# Patient Record
Sex: MALE | Race: WHITE | NOT HISPANIC OR LATINO | Employment: OTHER | URBAN - NONMETROPOLITAN AREA
[De-identification: names, ages, dates, MRNs, and addresses within clinical notes are randomized per-mention and may not be internally consistent; named-entity substitution may affect disease eponyms.]

---

## 2017-03-29 ENCOUNTER — OFFICE VISIT (OUTPATIENT)
Dept: NEUROSURGERY | Facility: CLINIC | Age: 72
End: 2017-03-29

## 2017-03-29 VITALS
WEIGHT: 173 LBS | DIASTOLIC BLOOD PRESSURE: 78 MMHG | HEIGHT: 72 IN | SYSTOLIC BLOOD PRESSURE: 168 MMHG | BODY MASS INDEX: 23.43 KG/M2

## 2017-03-29 DIAGNOSIS — D32.9 BENIGN MENINGIOMA (HCC): Primary | ICD-10-CM

## 2017-03-29 DIAGNOSIS — G20 PRIMARY PARKINSONISM (HCC): ICD-10-CM

## 2017-03-29 DIAGNOSIS — R41.3 MEMORY DIFFICULTIES: ICD-10-CM

## 2017-03-29 DIAGNOSIS — IMO0001 NORMAL BODY MASS INDEX (BMI): ICD-10-CM

## 2017-03-29 DIAGNOSIS — Z78.9 NON-SMOKER: ICD-10-CM

## 2017-03-29 PROCEDURE — 99203 OFFICE O/P NEW LOW 30 MIN: CPT | Performed by: NEUROLOGICAL SURGERY

## 2017-03-29 PROCEDURE — G8420 CALC BMI NORM PARAMETERS: HCPCS | Performed by: NEUROLOGICAL SURGERY

## 2017-03-29 PROCEDURE — 1036F TOBACCO NON-USER: CPT | Performed by: NEUROLOGICAL SURGERY

## 2017-03-29 NOTE — PROGRESS NOTES
Patient: Asher Torres  : 1945    Primary Care Provider: Eugene Correa MD    Requesting Provider: No ref. provider found        History    Chief Complaint: Speech difficulty  Chief Complaint   Patient presents with   • Abnormal Imaging     patient w/MRI brain from 3/17/17 done @ [facility] - meningioma?       History of Present Illness: This is a 71-year-old male sent from primary care with an abnormal MRI.  Mr. Torres relates 9 months of depression, speech difficulty, slowing of his movements, falling, some blurred vision, occasional headache.  The symptoms at all progressively gotten worse although they have waxed and waned in intensity.  The MRI was part of a workup for these complaints.    Review of Systems   All other systems reviewed and are negative.      Past Medical History:   Past Medical History:   Diagnosis Date   • BPH with urinary obstruction    • Coronary artery disease     WITH HISTORY OF MYOCARDIAL INFARCTION W/OUT HISTORY OF CABG   • Diverticulitis of colon    • Intrinsic sphincter deficiency (ISD)    • Myocardial infarction    • Nocturia    • Prostate cancer    • Renal cyst, right    • Urge incontinence    • Urinary frequency        Past Surgical History:   Past Surgical History:   Procedure Laterality Date   • BACK SURGERY     • NECK SURGERY     • PROSTATE SURGERY         Family History: family history includes Colon cancer in his brother.    Social History:  reports that he has never smoked. He does not have any smokeless tobacco history on file. He reports that he does not drink alcohol.    Medications:    (Not in a hospital admission)    Allergies:  Other; Proscar [finasteride]; and Sulfa antibiotics    Physical Exam:     Physical Exam   Constitutional: He is oriented to person, place, and time. He appears well-developed and well-nourished.   Cardiovascular: Regular rhythm and normal heart sounds.    Pulmonary/Chest: Effort normal and breath sounds normal. No  respiratory distress.   Abdominal: Soft. He exhibits no distension. There is no tenderness.   Neurological: He is oriented to person, place, and time. He has normal strength. He has an abnormal Romberg Test. He has a normal Tandem Gait Test.   Reflex Scores:       Tricep reflexes are 1+ on the right side and 1+ on the left side.       Bicep reflexes are 1+ on the right side and 1+ on the left side.       Brachioradialis reflexes are 1+ on the right side and 1+ on the left side.       Patellar reflexes are 1+ on the right side and 1+ on the left side.       Achilles reflexes are 1+ on the right side and 1+ on the left side.  Psychiatric: His speech is normal.       Neurologic Exam     Mental Status   Oriented to person, place, and time.   Attention: normal.   Speech: speech is normal   Level of consciousness: alert  Knowledge: good.        Bradyphrenia  Mask face     Cranial Nerves   Cranial nerves II through XII intact.     Motor Exam   Muscle bulk: normal  Overall muscle tone: normal  Right arm pronator drift: absent  Left arm pronator drift: absent    Strength   Strength 5/5 throughout.        Madi kinetic     Sensory Exam   Light touch normal.   Pinprick normal.     Gait, Coordination, and Reflexes     Gait  Gait: (Slow shuffling gait)    Coordination   Romberg: positive  Tandem walking coordination: normal    Tremor   Resting tremor: absent  Intention tremor: absent  Action tremor: absent    Reflexes   Right brachioradialis: 1+  Left brachioradialis: 1+  Right biceps: 1+  Left biceps: 1+  Right triceps: 1+  Left triceps: 1+  Right patellar: 1+  Left patellar: 1+  Right achilles: 1+  Left achilles: 1+  Right Colin: absent  Left Colin: absent  Right ankle clonus: absent  Left ankle clonus: absent        Independent Review of Radiographic Studies:   MRI of the brain shows a right frontal dural based homogeneously enhancing mass measuring approximately 2.3 cm with no mass effect or midline shift.  Consistent  with meningioma    ASSESSMENT/PLAN:  has a right frontal meningioma right now that is asymptomatic and does not require treatment.  Watch this with serial imaging every 6 months and plan focal beam radiation treatments should this mass demonstrate growth and progression.  Regarding his other complaints I think Mr. Torres has a Parkinson-like syndrome given his mask face Madi kinetic movements and bradyphrenia.  We are going to refer him to Dr. Schmidt consideration of this diagnosis.  We will reimage him in 6 months with an MRI.  1. Benign meningioma    2. Primary Parkinsonism    3. Memory difficulties    4. Normal body mass index (BMI)    5. Non-smoker          Return in about 6 months (around 9/29/2017) for follow up w/scan - DR SMITH.      Jimmy Smith MD

## 2017-05-15 ENCOUNTER — OFFICE VISIT (OUTPATIENT)
Dept: NEUROLOGY | Age: 72
End: 2017-05-15
Payer: MEDICARE

## 2017-05-15 VITALS
BODY MASS INDEX: 25.2 KG/M2 | WEIGHT: 180 LBS | HEIGHT: 71 IN | HEART RATE: 71 BPM | SYSTOLIC BLOOD PRESSURE: 177 MMHG | DIASTOLIC BLOOD PRESSURE: 105 MMHG

## 2017-05-15 DIAGNOSIS — Z86.39 HISTORY OF NON ANEMIC VITAMIN B12 DEFICIENCY: ICD-10-CM

## 2017-05-15 DIAGNOSIS — G20 PARKINSON DISEASE (HCC): Primary | ICD-10-CM

## 2017-05-15 DIAGNOSIS — R41.3 MEMORY LOSS: ICD-10-CM

## 2017-05-15 DIAGNOSIS — I10 ESSENTIAL HYPERTENSION: ICD-10-CM

## 2017-05-15 DIAGNOSIS — G62.9 NEUROPATHY: ICD-10-CM

## 2017-05-15 DIAGNOSIS — D32.9 MENINGIOMA (HCC): ICD-10-CM

## 2017-05-15 PROCEDURE — 3017F COLORECTAL CA SCREEN DOC REV: CPT | Performed by: PSYCHIATRY & NEUROLOGY

## 2017-05-15 PROCEDURE — 4040F PNEUMOC VAC/ADMIN/RCVD: CPT | Performed by: PSYCHIATRY & NEUROLOGY

## 2017-05-15 PROCEDURE — 99204 OFFICE O/P NEW MOD 45 MIN: CPT | Performed by: PSYCHIATRY & NEUROLOGY

## 2017-05-15 PROCEDURE — G8420 CALC BMI NORM PARAMETERS: HCPCS | Performed by: PSYCHIATRY & NEUROLOGY

## 2017-05-15 PROCEDURE — 1036F TOBACCO NON-USER: CPT | Performed by: PSYCHIATRY & NEUROLOGY

## 2017-05-15 PROCEDURE — G8427 DOCREV CUR MEDS BY ELIG CLIN: HCPCS | Performed by: PSYCHIATRY & NEUROLOGY

## 2017-05-15 PROCEDURE — 1123F ACP DISCUSS/DSCN MKR DOCD: CPT | Performed by: PSYCHIATRY & NEUROLOGY

## 2017-05-17 ENCOUNTER — TELEPHONE (OUTPATIENT)
Dept: NEUROLOGY | Age: 72
End: 2017-05-17

## 2017-05-23 ENCOUNTER — HOSPITAL ENCOUNTER (OUTPATIENT)
Dept: NEUROLOGY | Age: 72
Discharge: HOME | End: 2017-05-23
Payer: MEDICARE

## 2017-05-25 ENCOUNTER — TELEPHONE (OUTPATIENT)
Dept: NEUROLOGY | Age: 72
End: 2017-05-25

## 2017-05-25 DIAGNOSIS — G20 PARKINSON'S DISEASE (HCC): Primary | ICD-10-CM

## 2017-05-25 DIAGNOSIS — G20 PARKINSON DISEASE (HCC): Primary | ICD-10-CM

## 2017-06-02 ENCOUNTER — TRANSCRIBE ORDERS (OUTPATIENT)
Dept: ADMINISTRATIVE | Facility: HOSPITAL | Age: 72
End: 2017-06-02

## 2017-06-02 DIAGNOSIS — I25.10 CAD IN NATIVE ARTERY: Primary | ICD-10-CM

## 2017-06-12 ENCOUNTER — HOSPITAL ENCOUNTER (OUTPATIENT)
Dept: CARDIOLOGY | Facility: HOSPITAL | Age: 72
Discharge: HOME | End: 2017-06-12

## 2017-06-12 ENCOUNTER — HOSPITAL ENCOUNTER (OUTPATIENT)
Dept: CARDIOLOGY | Facility: HOSPITAL | Age: 72
Discharge: HOME | End: 2017-06-12
Admitting: FAMILY MEDICINE

## 2017-06-12 VITALS — SYSTOLIC BLOOD PRESSURE: 147 MMHG | DIASTOLIC BLOOD PRESSURE: 88 MMHG | HEART RATE: 55 BPM

## 2017-06-12 DIAGNOSIS — I25.10 CAD IN NATIVE ARTERY: ICD-10-CM

## 2017-06-12 PROCEDURE — 78452 HT MUSCLE IMAGE SPECT MULT: CPT | Performed by: INTERNAL MEDICINE

## 2017-06-12 PROCEDURE — 25010000002 REGADENOSON 0.4 MG/5ML SOLUTION: Performed by: INTERNAL MEDICINE

## 2017-06-12 PROCEDURE — 78452 HT MUSCLE IMAGE SPECT MULT: CPT

## 2017-06-12 PROCEDURE — 0 TECHNETIUM SESTAMIBI: Performed by: FAMILY MEDICINE

## 2017-06-12 PROCEDURE — A9500 TC99M SESTAMIBI: HCPCS | Performed by: FAMILY MEDICINE

## 2017-06-12 PROCEDURE — 93017 CV STRESS TEST TRACING ONLY: CPT

## 2017-06-12 PROCEDURE — 25010000002 AMINOPHYLLINE PER 250 MG: Performed by: INTERNAL MEDICINE

## 2017-06-12 PROCEDURE — 93018 CV STRESS TEST I&R ONLY: CPT | Performed by: INTERNAL MEDICINE

## 2017-06-12 RX ORDER — AMINOPHYLLINE DIHYDRATE 25 MG/ML
100 INJECTION, SOLUTION INTRAVENOUS ONCE AS NEEDED
Status: COMPLETED | OUTPATIENT
Start: 2017-06-12 | End: 2017-06-12

## 2017-06-12 RX ADMIN — Medication 1 DOSE: at 09:20

## 2017-06-12 RX ADMIN — Medication 1 DOSE: at 10:51

## 2017-06-12 RX ADMIN — AMINOPHYLLINE 100 MG: 25 INJECTION, SOLUTION INTRAVENOUS at 11:03

## 2017-06-12 RX ADMIN — REGADENOSON 0.4 MG: 0.08 INJECTION, SOLUTION INTRAVENOUS at 10:51

## 2017-06-13 LAB
BH CV STRESS BP STAGE 1: NORMAL
BH CV STRESS COMMENTS STAGE 1: NORMAL
BH CV STRESS DOSE REGADENOSON STAGE 1: 0.4
BH CV STRESS DURATION MIN STAGE 1: 0
BH CV STRESS DURATION SEC STAGE 1: 10
BH CV STRESS HR STAGE 1: 49
BH CV STRESS PROTOCOL 1: NORMAL
BH CV STRESS RECOVERY BP: NORMAL MMHG
BH CV STRESS RECOVERY HR: 63 BPM
BH CV STRESS STAGE 1: 1
LV EF NUC BP: 60 %
MAXIMAL PREDICTED HEART RATE: 148 BPM
PERCENT MAX PREDICTED HR: 33.11 %
STRESS BASELINE BP: NORMAL MMHG
STRESS BASELINE HR: 55 BPM
STRESS PERCENT HR: 39 %
STRESS POST PEAK BP: NORMAL MMHG
STRESS POST PEAK HR: 49 BPM
STRESS TARGET HR: 126 BPM

## 2017-08-15 ENCOUNTER — HOSPITAL ENCOUNTER (OUTPATIENT)
Dept: NEUROLOGY | Age: 72
Discharge: HOME | End: 2017-08-15
Payer: MEDICARE

## 2017-08-15 DIAGNOSIS — G62.9 NEUROPATHY: ICD-10-CM

## 2017-08-15 PROCEDURE — 95909 NRV CNDJ TST 5-6 STUDIES: CPT | Performed by: PSYCHIATRY & NEUROLOGY

## 2017-08-15 PROCEDURE — 95886 MUSC TEST DONE W/N TEST COMP: CPT | Performed by: PSYCHIATRY & NEUROLOGY

## 2017-08-15 PROCEDURE — 95909 NRV CNDJ TST 5-6 STUDIES: CPT

## 2017-08-15 PROCEDURE — 95886 MUSC TEST DONE W/N TEST COMP: CPT

## 2017-08-17 ENCOUNTER — OFFICE VISIT (OUTPATIENT)
Dept: NEUROLOGY | Age: 72
End: 2017-08-17
Payer: MEDICARE

## 2017-08-17 VITALS
DIASTOLIC BLOOD PRESSURE: 100 MMHG | BODY MASS INDEX: 23.98 KG/M2 | SYSTOLIC BLOOD PRESSURE: 148 MMHG | HEIGHT: 72 IN | WEIGHT: 177 LBS

## 2017-08-17 DIAGNOSIS — G20 PARKINSON DISEASE (HCC): ICD-10-CM

## 2017-08-17 DIAGNOSIS — G62.9 NEUROPATHY: ICD-10-CM

## 2017-08-17 DIAGNOSIS — G20 PARKINSON'S DISEASE (HCC): Primary | ICD-10-CM

## 2017-08-17 DIAGNOSIS — Z86.011 HISTORY OF MENINGIOMA OF THE BRAIN: ICD-10-CM

## 2017-08-17 DIAGNOSIS — G25.81 RESTLESS LEG SYNDROME: ICD-10-CM

## 2017-08-17 PROCEDURE — 1123F ACP DISCUSS/DSCN MKR DOCD: CPT | Performed by: PSYCHIATRY & NEUROLOGY

## 2017-08-17 PROCEDURE — 1036F TOBACCO NON-USER: CPT | Performed by: PSYCHIATRY & NEUROLOGY

## 2017-08-17 PROCEDURE — G8427 DOCREV CUR MEDS BY ELIG CLIN: HCPCS | Performed by: PSYCHIATRY & NEUROLOGY

## 2017-08-17 PROCEDURE — 4040F PNEUMOC VAC/ADMIN/RCVD: CPT | Performed by: PSYCHIATRY & NEUROLOGY

## 2017-08-17 PROCEDURE — 99214 OFFICE O/P EST MOD 30 MIN: CPT | Performed by: PSYCHIATRY & NEUROLOGY

## 2017-08-17 PROCEDURE — G8420 CALC BMI NORM PARAMETERS: HCPCS | Performed by: PSYCHIATRY & NEUROLOGY

## 2017-08-17 PROCEDURE — 3017F COLORECTAL CA SCREEN DOC REV: CPT | Performed by: PSYCHIATRY & NEUROLOGY

## 2017-08-17 RX ORDER — ASPIRIN 325 MG
325 TABLET ORAL PRN
COMMUNITY
End: 2019-08-05

## 2017-08-17 RX ORDER — DULOXETIN HYDROCHLORIDE 20 MG/1
20 CAPSULE, DELAYED RELEASE ORAL DAILY
Qty: 90 CAPSULE | Refills: 3 | Status: SHIPPED | OUTPATIENT
Start: 2017-08-17 | End: 2017-12-29

## 2017-09-26 ENCOUNTER — HOSPITAL ENCOUNTER (OUTPATIENT)
Dept: MRI IMAGING | Facility: HOSPITAL | Age: 72
Discharge: HOME | End: 2017-09-26
Attending: NEUROLOGICAL SURGERY | Admitting: NEUROLOGICAL SURGERY

## 2017-09-26 ENCOUNTER — OFFICE VISIT (OUTPATIENT)
Dept: NEUROSURGERY | Facility: CLINIC | Age: 72
End: 2017-09-26

## 2017-09-26 VITALS
WEIGHT: 177.5 LBS | BODY MASS INDEX: 24.04 KG/M2 | SYSTOLIC BLOOD PRESSURE: 116 MMHG | DIASTOLIC BLOOD PRESSURE: 58 MMHG | HEIGHT: 72 IN

## 2017-09-26 DIAGNOSIS — Z78.9 NON-SMOKER: ICD-10-CM

## 2017-09-26 DIAGNOSIS — C61 PROSTATE CANCER (HCC): Primary | ICD-10-CM

## 2017-09-26 DIAGNOSIS — G20 PRIMARY PARKINSONISM (HCC): ICD-10-CM

## 2017-09-26 DIAGNOSIS — R41.3 MEMORY DIFFICULTIES: ICD-10-CM

## 2017-09-26 DIAGNOSIS — D32.9 BENIGN MENINGIOMA (HCC): ICD-10-CM

## 2017-09-26 DIAGNOSIS — D32.9 BENIGN MENINGIOMA (HCC): Primary | ICD-10-CM

## 2017-09-26 DIAGNOSIS — IMO0001 NORMAL BODY MASS INDEX (BMI): ICD-10-CM

## 2017-09-26 LAB
CREAT BLDA-MCNC: 1.1 MG/DL (ref 0.6–1.3)
PSA SERPL-MCNC: <0.07 NG/ML (ref 0–4)

## 2017-09-26 PROCEDURE — 70553 MRI BRAIN STEM W/O & W/DYE: CPT

## 2017-09-26 PROCEDURE — 25510000001 GADOBENATE DIMEGLUMINE 529 MG/ML SOLUTION: Performed by: NEUROLOGICAL SURGERY

## 2017-09-26 PROCEDURE — A9577 INJ MULTIHANCE: HCPCS | Performed by: NEUROLOGICAL SURGERY

## 2017-09-26 PROCEDURE — 99213 OFFICE O/P EST LOW 20 MIN: CPT | Performed by: NEUROLOGICAL SURGERY

## 2017-09-26 PROCEDURE — 82565 ASSAY OF CREATININE: CPT

## 2017-09-26 RX ORDER — DULOXETIN HYDROCHLORIDE 20 MG/1
CAPSULE, DELAYED RELEASE ORAL
Refills: 3 | COMMUNITY
Start: 2017-08-17 | End: 2017-10-19 | Stop reason: ALTCHOICE

## 2017-09-26 RX ORDER — CARVEDILOL 12.5 MG/1
TABLET ORAL
Refills: 12 | COMMUNITY
Start: 2017-09-12 | End: 2018-09-27 | Stop reason: DRUGHIGH

## 2017-09-26 RX ORDER — ASPIRIN 325 MG
TABLET ORAL
COMMUNITY
End: 2020-09-24 | Stop reason: ALTCHOICE

## 2017-09-26 RX ADMIN — GADOBENATE DIMEGLUMINE 20 ML: 529 INJECTION, SOLUTION INTRAVENOUS at 13:15

## 2017-09-26 NOTE — PROGRESS NOTES
SUBJECTIVE:  Patient ID: Asher Torres is a 71 y.o. male is here today for follow-up.    Chief Complaint: Meningioma  Chief Complaint   Patient presents with   • Brain Tumor     MRI today @ [facility]; patient states he has seen Dr Ghosh and therapy       HPI  71-year-old gentleman that we saw about 6 months ago for incidental meningioma.  He has since been diagnosed with Parkinson's disease.  He is on Sinemet and has had a very good response.  In addition he has done Parkinson's rehabilitation with a excellent response in his functioning.    The following portions of the patient's history were reviewed and updated as appropriate: allergies, current medications, past family history, past medical history, past social history, past surgical history and problem list.    OBJECTIVE:    Review of Systems   Musculoskeletal: Positive for gait problem.   Neurological: Positive for weakness.   Psychiatric/Behavioral: Positive for behavioral problems.   All other systems reviewed and are negative.         Physical Exam   Constitutional: He is oriented to person, place, and time.   HENT:   Right Ear: Hearing normal.   Left Ear: Hearing normal.   Neurological: He is oriented to person, place, and time. He displays a negative Romberg sign.   Psychiatric: His speech is normal. Cognition and memory are normal.       Neurologic Exam     Mental Status   Oriented to person, place, and time.   Attention: normal.   Speech: speech is normal   Level of consciousness: alert  Knowledge: good.        Mask face     Cranial Nerves   Cranial nerves II through XII intact.     Motor Exam   Muscle bulk: normal  Overall muscle tone: normal  Right arm pronator drift: absent  Left arm pronator drift: absent    Sensory Exam   Light touch normal.   Pinprick normal.     Gait, Coordination, and Reflexes     Gait  Gait: (Slow gait and cautious but good step length)    Tremor   Resting tremor: absent  Intention tremor: absent  Action tremor:  absent      Independent Review of Radiographic Studies:   MRI of the brain with and without contrast shows a stable right frontal meningioma measuring 13 x 18 x 23 mm.    ASSESSMENT/PLAN:  The patient has been diagnosed with Parkinson's and has been started on therapy and is doing very well.  His meningioma is unchanged compared to previous imaging.  I would reimage him every year.      1. Benign meningioma    2. Primary Parkinsonism    3. Non-smoker    4. Normal body mass index (BMI)            Return in about 1 year (around 9/26/2018) for follow up w/scan - DR SMITH.      Jimmy Smith MD

## 2017-10-19 ENCOUNTER — OFFICE VISIT (OUTPATIENT)
Dept: UROLOGY | Facility: CLINIC | Age: 72
End: 2017-10-19

## 2017-10-19 VITALS
BODY MASS INDEX: 24.33 KG/M2 | TEMPERATURE: 98 F | DIASTOLIC BLOOD PRESSURE: 70 MMHG | SYSTOLIC BLOOD PRESSURE: 138 MMHG | WEIGHT: 179.6 LBS | HEIGHT: 72 IN

## 2017-10-19 DIAGNOSIS — N32.81 OAB (OVERACTIVE BLADDER): ICD-10-CM

## 2017-10-19 DIAGNOSIS — N39.3 SUI (STRESS URINARY INCONTINENCE), MALE: ICD-10-CM

## 2017-10-19 DIAGNOSIS — C61 PROSTATE CANCER (HCC): Primary | ICD-10-CM

## 2017-10-19 LAB
BILIRUB BLD-MCNC: NEGATIVE MG/DL
CLARITY, POC: CLEAR
COLOR UR: YELLOW
GLUCOSE UR STRIP-MCNC: NEGATIVE MG/DL
KETONES UR QL: NEGATIVE
LEUKOCYTE EST, POC: NEGATIVE
NITRITE UR-MCNC: NEGATIVE MG/ML
PH UR: 6 [PH] (ref 5–8)
PROT UR STRIP-MCNC: NEGATIVE MG/DL
RBC # UR STRIP: NEGATIVE /UL
SP GR UR: 1.03 (ref 1–1.03)
UROBILINOGEN UR QL: NORMAL

## 2017-10-19 PROCEDURE — 81003 URINALYSIS AUTO W/O SCOPE: CPT | Performed by: UROLOGY

## 2017-10-19 PROCEDURE — 99214 OFFICE O/P EST MOD 30 MIN: CPT | Performed by: UROLOGY

## 2017-10-19 PROCEDURE — 51798 US URINE CAPACITY MEASURE: CPT | Performed by: UROLOGY

## 2017-10-19 RX ORDER — SOLIFENACIN SUCCINATE 5 MG/1
5 TABLET, FILM COATED ORAL DAILY
Qty: 30 TABLET | Refills: 1 | Status: SHIPPED | OUTPATIENT
Start: 2017-10-19 | End: 2017-11-27 | Stop reason: ALTCHOICE

## 2017-10-19 NOTE — PROGRESS NOTES
Subjective    Mr. Torres is 72 y.o. male    CHIEF COMPLAINT: Prostate cancer    The patient is now 4 years status post radical prostatectomy for stage TIc disease moderate [place]'s grade he underwent a robotic resection he has done well since then without evidence of any disease his most recent PSA is 0.    He has no symptoms of metastatic disease such as bone scan bone pain rather back pain weight loss and anorexia etc.    He also has a history of stress incontinence incontinence postoperative the last year he has noticed is gotten a lot worse he has also been diagnosed with a year however with Parkinson's disease he is now on Sinemet.    He is not having a lot more urgency frequency and dribbling all the time one more pass etc. I do not know whether this is related to his Parkinson's or whether his sphincter dysfunction is worsening      History of Present Illness      The following portions of the patient's history were reviewed and updated as appropriate: allergies, current medications, past family history, past medical history, past social history, past surgical history and problem list.    Review of Systems   Constitutional: Negative for appetite change and fever.   HENT: Negative for hearing loss and sore throat.    Eyes: Negative for pain and redness.   Respiratory: Negative for cough and shortness of breath.    Cardiovascular: Negative for chest pain and leg swelling.   Gastrointestinal: Negative for anal bleeding, nausea and vomiting.   Endocrine: Negative for cold intolerance and heat intolerance.   Genitourinary: Positive for frequency and urgency. Negative for dysuria, flank pain and hematuria.   Musculoskeletal: Negative for joint swelling and myalgias.   Skin: Negative for color change and rash.   Allergic/Immunologic: Negative for immunocompromised state.   Neurological: Negative for dizziness and speech difficulty.   Hematological: Negative for adenopathy. Does not bruise/bleed easily.  "  Psychiatric/Behavioral: Negative for dysphoric mood and suicidal ideas.         Current Outpatient Prescriptions:   •  aspirin 325 MG tablet, Take  by mouth., Disp: , Rfl:   •  carbidopa-levodopa (SINEMET)  MG per tablet, TAKE 1 TABLET BY MOUTH BEFORE MEALS AND EVERY DAY AT BEDTIME, Disp: , Rfl: 12  •  carvedilol (COREG) 12.5 MG tablet, TAKE 1 TABLET BY MOUTH 2 TIMES A DAY, Disp: , Rfl: 12  •  hydrocortisone 2.5 % cream, APPLY TO AFFECTED AREA 4 TIMES A DAY, Disp: , Rfl: 0  •  Probiotic Product (PROBIOTIC ADVANCED PO), Take  by mouth., Disp: , Rfl:     Past Medical History:   Diagnosis Date   • BPH with urinary obstruction    • Coronary artery disease     WITH HISTORY OF MYOCARDIAL INFARCTION W/OUT HISTORY OF CABG   • Diverticulitis of colon    • Intrinsic sphincter deficiency (ISD)    • Myocardial infarction    • Nocturia    • Parkinson's disease march 2018   • Prostate cancer    • Renal cyst, right    • Urge incontinence    • Urinary frequency        Past Surgical History:   Procedure Laterality Date   • BACK SURGERY     • CARDIAC CATHETERIZATION     • NECK SURGERY     • PROSTATE SURGERY         Social History     Social History   • Marital status:      Spouse name: N/A   • Number of children: N/A   • Years of education: N/A     Social History Main Topics   • Smoking status: Never Smoker   • Smokeless tobacco: None   • Alcohol use No   • Drug use: None   • Sexual activity: Not Asked     Other Topics Concern   • None     Social History Narrative       Family History   Problem Relation Age of Onset   • Colon cancer Brother    • No Known Problems Mother    • No Known Problems Father        Objective    /70  Temp 98 °F (36.7 °C)  Ht 72\" (182.9 cm)  Wt 179 lb 9.6 oz (81.5 kg)  BMI 24.36 kg/m2    Physical Exam      Orders Only on 09/26/2017   Component Date Value Ref Range Status   • PSA 09/26/2017 <0.070  0.000 - 4.000 ng/mL Final       Results for orders placed or performed in visit on " 10/19/17   POC Urinalysis Dipstick, Automated   Result Value Ref Range    Color Yellow Yellow, Straw, Dark Yellow, Angela    Clarity, UA Clear Clear    Glucose, UA Negative Negative, 1000 mg/dL (3+) mg/dL    Bilirubin Negative Negative    Ketones, UA Negative Negative    Specific Gravity  1.030 1.005 - 1.030    Blood, UA Negative Negative    pH, Urine 6.0 5.0 - 8.0    Protein, POC Negative Negative mg/dL    Urobilinogen, UA Normal Normal    Leukocytes Negative Negative    Nitrite, UA Negative Negative       Assessment and Plan    Estimation of residual urine via abdominal ultrasound  Residual Urine: 019 ml  Indication: incontinence  Position: Supine  Examination: Incremental scanning of the suprapubic area using 3 MHz transducer using copious amounts of acoustic gel.   Findings: An anechoic area was demonstrated which represented the bladder, with measurement of residual urine as noted. I inspected this myself. In that the residual urine was stable or insignificant, no treatment will be necessary at this time.                       Asher was seen today for prostate cancer.    Diagnoses and all orders for this visit:    Prostate cancer  -     POC Urinalysis Dipstick, Automated    CARIDAD (stress urinary incontinence), male    OAB (overactive bladder)    Plan--from a prostate cancer point review he is doing well there is been no evidence recurrence so we will just check him again in a year with a PSA    From a stress incontinence point review his symptoms have considerably worsened I do not know whether this is due to his stress incontinence that has just worsen recently or of concern of course would be the Parkinson's disease.    He does empty well so it may be more just at the sphincter dysfunction has worsened.    On one to try back again on some Vesicare 5 mg daily side effects were discussed again her samples were him prescription will see him back in a month and see how he is doing.    If he is not improved then we  will probably need to do some urodynamics we will refer him to Dr. Kolb

## 2017-11-27 ENCOUNTER — OFFICE VISIT (OUTPATIENT)
Dept: UROLOGY | Facility: CLINIC | Age: 72
End: 2017-11-27

## 2017-11-27 VITALS — HEIGHT: 72 IN | WEIGHT: 178.6 LBS | TEMPERATURE: 97.5 F | BODY MASS INDEX: 24.19 KG/M2

## 2017-11-27 DIAGNOSIS — N32.81 OAB (OVERACTIVE BLADDER): ICD-10-CM

## 2017-11-27 DIAGNOSIS — C61 PROSTATE CANCER (HCC): Primary | ICD-10-CM

## 2017-11-27 DIAGNOSIS — N39.3 SUI (STRESS URINARY INCONTINENCE), MALE: ICD-10-CM

## 2017-11-27 PROCEDURE — 99213 OFFICE O/P EST LOW 20 MIN: CPT | Performed by: UROLOGY

## 2017-11-27 PROCEDURE — 81003 URINALYSIS AUTO W/O SCOPE: CPT | Performed by: UROLOGY

## 2017-11-27 NOTE — PROGRESS NOTES
Subjective    Mr. Torres is 72 y.o. male    CHIEF COMPLAINT: Incontinence    The patient comes back today for follow-up of incontinence I am getting Vesicare the last time he is felt that the First 3 or 4 days in early works great but then, losses effectiveness he is better now than it was but he stopped the medication after about a month and he really did not know 69 worsening again up his symptoms.    Once again he denies any gross hematuria there is no flank pain a little bit of urgency but that is about it.    He did feel also the medication did give him a headache and that is pretty much gone away as well      History of Present Illness      The following portions of the patient's history were reviewed and updated as appropriate: allergies, current medications, past family history, past medical history, past social history, past surgical history and problem list.    Review of Systems   Constitutional: Negative.  Negative for chills and fever.   Gastrointestinal: Negative for abdominal distention, abdominal pain, anal bleeding, blood in stool and nausea.   Genitourinary: Positive for urgency. Negative for difficulty urinating, dysuria, flank pain, frequency and hematuria.   Psychiatric/Behavioral: Negative.  Negative for agitation and confusion.         Current Outpatient Prescriptions:   •  aspirin 325 MG tablet, Take  by mouth., Disp: , Rfl:   •  carbidopa-levodopa (SINEMET)  MG per tablet, TAKE 1 TABLET BY MOUTH BEFORE MEALS AND EVERY DAY AT BEDTIME, Disp: , Rfl: 12  •  carvedilol (COREG) 12.5 MG tablet, TAKE 1 TABLET BY MOUTH 2 TIMES A DAY, Disp: , Rfl: 12  •  hydrocortisone 2.5 % cream, APPLY TO AFFECTED AREA 4 TIMES A DAY, Disp: , Rfl: 0  •  Probiotic Product (PROBIOTIC ADVANCED PO), Take  by mouth., Disp: , Rfl:     Past Medical History:   Diagnosis Date   • BPH with urinary obstruction    • Coronary artery disease     WITH HISTORY OF MYOCARDIAL INFARCTION W/OUT HISTORY OF CABG   • Diverticulitis of  "colon    • Intrinsic sphincter deficiency (ISD)    • Myocardial infarction    • Nocturia    • Parkinson's disease march 2018   • Prostate cancer    • Renal cyst, right    • Urge incontinence    • Urinary frequency        Past Surgical History:   Procedure Laterality Date   • BACK SURGERY     • CARDIAC CATHETERIZATION     • NECK SURGERY     • PROSTATE SURGERY         Social History     Social History   • Marital status:      Spouse name: N/A   • Number of children: N/A   • Years of education: N/A     Social History Main Topics   • Smoking status: Never Smoker   • Smokeless tobacco: Never Used   • Alcohol use No   • Drug use: None   • Sexual activity: Not Asked     Other Topics Concern   • None     Social History Narrative       Family History   Problem Relation Age of Onset   • Colon cancer Brother    • No Known Problems Mother    • No Known Problems Father        Objective    Temp 97.5 °F (36.4 °C)  Ht 72\" (182.9 cm)  Wt 178 lb 9.6 oz (81 kg)  BMI 24.22 kg/m2    Physical Exam      Office Visit on 10/19/2017   Component Date Value Ref Range Status   • Color 10/19/2017 Yellow  Yellow, Straw, Dark Yellow, Angela Final   • Clarity, UA 10/19/2017 Clear  Clear Final   • Glucose, UA 10/19/2017 Negative  Negative, 1000 mg/dL (3+) mg/dL Final   • Bilirubin 10/19/2017 Negative  Negative Final   • Ketones, UA 10/19/2017 Negative  Negative Final   • Specific Gravity  10/19/2017 1.030  1.005 - 1.030 Final   • Blood, UA 10/19/2017 Negative  Negative Final   • pH, Urine 10/19/2017 6.0  5.0 - 8.0 Final   • Protein, POC 10/19/2017 Negative  Negative mg/dL Final   • Urobilinogen, UA 10/19/2017 Normal  Normal Final   • Leukocytes 10/19/2017 Negative  Negative Final   • Nitrite, UA 10/19/2017 Negative  Negative Final       Results for orders placed or performed in visit on 11/27/17   POC Urinalysis Dipstick, Automated   Result Value Ref Range    Color Yellow Yellow, Straw, Dark Yellow, Angela    Clarity, UA Clear Clear    " Glucose, UA Negative Negative, 1000 mg/dL (3+) mg/dL    Bilirubin Negative Negative    Ketones, UA Negative Negative    Specific Gravity  1.030 1.005 - 1.030    Blood, UA Negative Negative    pH, Urine 6.0 5.0 - 8.0    Protein, POC Negative Negative mg/dL    Urobilinogen, UA Normal Normal    Leukocytes Negative Negative    Nitrite, UA Negative Negative       Assessment and Plan    Diagnoses and all orders for this visit:    Prostate cancer  -     POC Urinalysis Dipstick, Automated    CARIDAD (stress urinary incontinence), male    OAB (overactive bladder)    Plan--I discussed the options with him and his wife is far as management indicated to them that we can try another medication or weak and it once again refer him to Dr. Kolb.    He is a little bit reluctant to proceed with surgery at this point in time although I told him again it is relatively less invasive than the previous operations etc. so what I think is going to think about that.    I did give him samples of Myrbetriq take 25 mg once a day for a month we will seen back then and see how he

## 2017-12-29 ENCOUNTER — OFFICE VISIT (OUTPATIENT)
Dept: NEUROLOGY | Age: 72
End: 2017-12-29
Payer: MEDICARE

## 2017-12-29 VITALS
BODY MASS INDEX: 24.24 KG/M2 | HEIGHT: 72 IN | DIASTOLIC BLOOD PRESSURE: 108 MMHG | WEIGHT: 179 LBS | SYSTOLIC BLOOD PRESSURE: 165 MMHG

## 2017-12-29 DIAGNOSIS — G25.81 RESTLESS LEG SYNDROME: ICD-10-CM

## 2017-12-29 DIAGNOSIS — G62.9 NEUROPATHY: ICD-10-CM

## 2017-12-29 DIAGNOSIS — G20 PARKINSON'S DISEASE (HCC): Primary | ICD-10-CM

## 2017-12-29 PROCEDURE — 1123F ACP DISCUSS/DSCN MKR DOCD: CPT | Performed by: PSYCHIATRY & NEUROLOGY

## 2017-12-29 PROCEDURE — G8420 CALC BMI NORM PARAMETERS: HCPCS | Performed by: PSYCHIATRY & NEUROLOGY

## 2017-12-29 PROCEDURE — 3017F COLORECTAL CA SCREEN DOC REV: CPT | Performed by: PSYCHIATRY & NEUROLOGY

## 2017-12-29 PROCEDURE — G8427 DOCREV CUR MEDS BY ELIG CLIN: HCPCS | Performed by: PSYCHIATRY & NEUROLOGY

## 2017-12-29 PROCEDURE — 99214 OFFICE O/P EST MOD 30 MIN: CPT | Performed by: PSYCHIATRY & NEUROLOGY

## 2017-12-29 PROCEDURE — 1036F TOBACCO NON-USER: CPT | Performed by: PSYCHIATRY & NEUROLOGY

## 2017-12-29 PROCEDURE — 4040F PNEUMOC VAC/ADMIN/RCVD: CPT | Performed by: PSYCHIATRY & NEUROLOGY

## 2017-12-29 PROCEDURE — G8484 FLU IMMUNIZE NO ADMIN: HCPCS | Performed by: PSYCHIATRY & NEUROLOGY

## 2017-12-29 NOTE — PROGRESS NOTES
REVIEW OF SYSTEMS    Constitutional: []Fever []Sweats []Chills [] Recent Injury   [x] Denies all unless marked  HENT:[]Headache  [] Head Injury  [] Sore Throat  [] Ear Pain  [] Dizziness [] Hearing Loss   [x] Denies all unless marked  Spine:  [] Neck pain  [] Back pain  [] Sciaticia  [x] Denies all unless marked  Cardiovascular:[]Chest Pain []Palpitations [] Heart Disease  [x] Denies all unless marked  Pulmonary: []Shortness of Breath []Cough   [x] Denies all unless marked  Gastrointestinal:  []Abdominal Pain  []Blood in Stool  []Diarrhea []Constipation []Nausea  []Vomiting  [x] Denies all unless marked  Genitourinary:  [] Dysuria [] Frequency  [] Incontinence [] Urgency   [x] Denies all unless marked  Musculoskeletal: [] Arthralgia  [] Myalgias [] Muscle cramps  [] Muscle twitches   [x] Denies all unless marked   Extremities:   [] Pain   [] Swelling   [x] Denies all unless marked  Skin:[] Rash  [] Color Change  [x] Denies all unless marked  Neurological:[] Visual Disturbance [] Double Vision [] Slurred Speech [] Trouble swallowing  [] Vertigo [] Tingling [] Numbness [] Weakness [] Loss of Balance   [] Loss of Consciousness [] Memory Loss  [x] Denies all unless marked  Psychiatric/Behavioral:[] Depression [] Anxiety  [x] Denies all unless marked  Sleep: []  Insomnia [] Sleep Disturbance [] Snoring [] Restless Legs [] Daytime Sleepiness [] Sleep Apnea  [x] Denies all unless marked
VII-no facial asymmetry. He has a masked face with decreased blink rate  CN VIII-Hearing intact       Motor Exam  V/V throughout upper and lower extremities bilaterally, has bradykinesia which is worse on the Right. Some cogwheeling is seen on the right. Sensory Exam  Decreased vibration to the ankles and decreased pinprick to the shins. Reflexes   Absent throughout      Tremors- no tremors in hands or head noted    Gait  Gait was slowed and slightly unsteady. Decreased arm swing noted. Turns en bloc    Coordination  Finger to nose-unremarkable          Assessment    ICD-10-CM ICD-9-CM    1. Parkinson's disease ([address] G20 332.0 External Referral To Physical Therapy   2. Restless leg syndrome G25.81 333.94    3. Neuropathy ([facility]) G62.9 355.9      Neuropathy and restless legs are stable. He will try increasing his Sinemet to 1-1/2 pills 3 times a day slowly. He may benefit more from [address] in the future. He just got a new prescription for Sinemet dose we will stick with that for now. We also discussed measures to avoid orthostatic hypotension which he appears to be having intermittently. His blood pressure is actually high today. He has trouble tolerating blood pressure medication too, apparently. Plan        Increase Sinemet to 1-1/2 pills 3 times a day. Redo the big/loud program within the next couple of months. Pt warned of potential side effects of medication changes/new medicines. They are to call for new or ongoing difficulties. Return in about 3 months (around 3/29/2018).     (Please note that portions of this note were completed with a voice recognition program. Efforts were made to edit the dictations but occasionally words are mis-transcribed.)

## 2018-04-05 ENCOUNTER — OFFICE VISIT (OUTPATIENT)
Dept: NEUROLOGY | Age: 73
End: 2018-04-05
Payer: MEDICARE

## 2018-04-05 VITALS
DIASTOLIC BLOOD PRESSURE: 86 MMHG | WEIGHT: 174 LBS | BODY MASS INDEX: 23.57 KG/M2 | SYSTOLIC BLOOD PRESSURE: 162 MMHG | HEIGHT: 72 IN

## 2018-04-05 DIAGNOSIS — D32.9 MENINGIOMA (HCC): ICD-10-CM

## 2018-04-05 DIAGNOSIS — G25.81 RESTLESS LEG SYNDROME: ICD-10-CM

## 2018-04-05 DIAGNOSIS — G62.9 NEUROPATHY: ICD-10-CM

## 2018-04-05 DIAGNOSIS — G20 PARKINSON'S DISEASE (HCC): Primary | ICD-10-CM

## 2018-04-05 PROCEDURE — G8420 CALC BMI NORM PARAMETERS: HCPCS | Performed by: PSYCHIATRY & NEUROLOGY

## 2018-04-05 PROCEDURE — 99214 OFFICE O/P EST MOD 30 MIN: CPT | Performed by: PSYCHIATRY & NEUROLOGY

## 2018-04-05 PROCEDURE — 4040F PNEUMOC VAC/ADMIN/RCVD: CPT | Performed by: PSYCHIATRY & NEUROLOGY

## 2018-04-05 PROCEDURE — 3017F COLORECTAL CA SCREEN DOC REV: CPT | Performed by: PSYCHIATRY & NEUROLOGY

## 2018-04-05 PROCEDURE — 1123F ACP DISCUSS/DSCN MKR DOCD: CPT | Performed by: PSYCHIATRY & NEUROLOGY

## 2018-04-05 PROCEDURE — G8427 DOCREV CUR MEDS BY ELIG CLIN: HCPCS | Performed by: PSYCHIATRY & NEUROLOGY

## 2018-04-05 PROCEDURE — 1036F TOBACCO NON-USER: CPT | Performed by: PSYCHIATRY & NEUROLOGY

## 2018-04-05 RX ORDER — DULOXETIN HYDROCHLORIDE 30 MG/1
30 CAPSULE, DELAYED RELEASE ORAL DAILY
Qty: 30 CAPSULE | Refills: 5 | Status: SHIPPED | OUTPATIENT
Start: 2018-04-05 | End: 2018-10-28 | Stop reason: SDUPTHER

## 2018-04-05 RX ORDER — DULOXETIN HYDROCHLORIDE 20 MG/1
20 CAPSULE, DELAYED RELEASE ORAL DAILY
COMMUNITY
End: 2018-08-06

## 2018-08-06 ENCOUNTER — OFFICE VISIT (OUTPATIENT)
Dept: NEUROLOGY | Age: 73
End: 2018-08-06
Payer: MEDICARE

## 2018-08-06 VITALS
WEIGHT: 173 LBS | HEART RATE: 81 BPM | BODY MASS INDEX: 23.43 KG/M2 | DIASTOLIC BLOOD PRESSURE: 105 MMHG | HEIGHT: 72 IN | SYSTOLIC BLOOD PRESSURE: 158 MMHG

## 2018-08-06 DIAGNOSIS — G20 PARKINSON'S DISEASE (HCC): Primary | ICD-10-CM

## 2018-08-06 DIAGNOSIS — G62.9 NEUROPATHY: ICD-10-CM

## 2018-08-06 DIAGNOSIS — G25.81 RESTLESS LEG SYNDROME: ICD-10-CM

## 2018-08-06 PROCEDURE — G8420 CALC BMI NORM PARAMETERS: HCPCS | Performed by: PSYCHIATRY & NEUROLOGY

## 2018-08-06 PROCEDURE — 99214 OFFICE O/P EST MOD 30 MIN: CPT | Performed by: PSYCHIATRY & NEUROLOGY

## 2018-08-06 PROCEDURE — 1101F PT FALLS ASSESS-DOCD LE1/YR: CPT | Performed by: PSYCHIATRY & NEUROLOGY

## 2018-08-06 PROCEDURE — G8427 DOCREV CUR MEDS BY ELIG CLIN: HCPCS | Performed by: PSYCHIATRY & NEUROLOGY

## 2018-08-06 PROCEDURE — 4040F PNEUMOC VAC/ADMIN/RCVD: CPT | Performed by: PSYCHIATRY & NEUROLOGY

## 2018-08-06 PROCEDURE — 1036F TOBACCO NON-USER: CPT | Performed by: PSYCHIATRY & NEUROLOGY

## 2018-08-06 PROCEDURE — 3017F COLORECTAL CA SCREEN DOC REV: CPT | Performed by: PSYCHIATRY & NEUROLOGY

## 2018-08-06 PROCEDURE — 1123F ACP DISCUSS/DSCN MKR DOCD: CPT | Performed by: PSYCHIATRY & NEUROLOGY

## 2018-08-06 NOTE — PROGRESS NOTES
Review of Systems    Constitutional - No fever or chills. yes diaphoresis or significant fatigue. HENT -  No tinnitus or significant hearing loss. Eyes - no sudden vision change or eye pain  Respiratory - no significant shortness of breath or cough  Cardiovascular - no chest pain No palpitations or significant leg swelling  Gastrointestinal - no abdominal swelling or pain. Genitourinary - No difficulty urinating, dysuria  Musculoskeletal - yes back pain or myalgia. Skin - no color change or rash  Neurologic - No seizures. No lateralizing weakness. Hematologic - no easy bruising or excessive bleeding. Psychiatric - no severe anxiety or nervousness. All other review of systems are negative.

## 2018-08-10 NOTE — PROGRESS NOTES
(1.829 m)   Wt 173 lb (78.5 kg)   BMI 23.46 kg/m²       Constitutional - well developed, well nourished. Eyes - conjunctiva normal.  Pupils react to light  Ear, nose, throat -hearing intact to finger rub No scars, masses, or lesions over external nose or ears, no atrophy of tongue  Neck-symmetric, no masses noted, no jugular vein distension. No bruits noted. Respiration- chest wall appears symmetric, good expansion,   normal effort without use of accessory muscles  Cardiovascular- RRR  Musculoskeletal - no significant wasting of muscles noted, no bony deformities, gait no gross ataxia  Extremities-no clubbing, cyanosis or edema  Skin - warm, dry, and intact. No rash, erythema, or pallor. Psychiatric - mood, affect, and behavior appear normal.      Neurological exam  Awake, alert, fluent oriented x 3 appropriate affect  Attention and concentration appear appropriate  Short-term memory 3/4 at 5 minutes. 4/4 with cues. Remote memory was good. Her clock draw. Followed complex commands well. Speech shows hypophonia but no dysarthria  No clear issues with language of fund of knowledge    Cranial Nerve Exam   CN II- Visual fields grossly unremarkable. VA adequate. PERRLA. CN III, IV,VI-EOMI, No nystagmus, conjugate eye movements, no ptosis  CN VII-no facial asymmetry. He has a masked face with decreased blink rate  CN VIII-Hearing intact       Motor Exam  V/V throughout upper and lower extremities bilaterally, has bradykinesia which is worse on the Right. Some cogwheeling is seen on the right. Sensory Exam  Decreased vibration to the ankles and decreased pinprick to the shins. Reflexes   Absent throughout      Tremors- no tremors in hands or head noted    Gait  Gait was slowed and slightly unsteady. Decreased arm swing noted. Turns en bloc    Coordination  Finger to nose-unremarkable          Assessment    ICD-10-CM ICD-9-CM    1. Parkinson's disease ([facility] [address] G20 332.0    2.  Restless leg syndrome G25.81 333.94    3. Neuropathy G62.9 355.9      Neuropathy and restless legs are stable. Meningioma is presumably stable. Continue on current medication for Parkinson's disease as is. He will be given a trial of Xadago. He was warned of potential side effects. Plan        Return in about 3 months (around 11/6/2018).     (Please note that portions of this note were completed with a voice recognition program. Efforts were made to edit the dictations but occasionally words are mis-transcribed.)

## 2018-09-05 ENCOUNTER — TELEPHONE (OUTPATIENT)
Dept: NEUROLOGY | Age: 73
End: 2018-09-05

## 2018-09-27 ENCOUNTER — HOSPITAL ENCOUNTER (OUTPATIENT)
Dept: MRI IMAGING | Facility: HOSPITAL | Age: 73
Discharge: HOME | End: 2018-09-27
Attending: NEUROLOGICAL SURGERY | Admitting: NEUROLOGICAL SURGERY

## 2018-09-27 ENCOUNTER — OFFICE VISIT (OUTPATIENT)
Dept: NEUROSURGERY | Facility: CLINIC | Age: 73
End: 2018-09-27

## 2018-09-27 VITALS
SYSTOLIC BLOOD PRESSURE: 170 MMHG | HEIGHT: 72 IN | DIASTOLIC BLOOD PRESSURE: 82 MMHG | BODY MASS INDEX: 23.6 KG/M2 | WEIGHT: 174.2 LBS

## 2018-09-27 DIAGNOSIS — D32.9 BENIGN MENINGIOMA (HCC): Primary | ICD-10-CM

## 2018-09-27 DIAGNOSIS — D32.9 BENIGN MENINGIOMA (HCC): ICD-10-CM

## 2018-09-27 DIAGNOSIS — IMO0001 NORMAL BODY MASS INDEX (BMI): ICD-10-CM

## 2018-09-27 DIAGNOSIS — Z78.9 NON-SMOKER: ICD-10-CM

## 2018-09-27 LAB — CREAT BLDA-MCNC: 0.9 MG/DL (ref 0.6–1.3)

## 2018-09-27 PROCEDURE — 70553 MRI BRAIN STEM W/O & W/DYE: CPT

## 2018-09-27 PROCEDURE — A9577 INJ MULTIHANCE: HCPCS | Performed by: NEUROLOGICAL SURGERY

## 2018-09-27 PROCEDURE — 0 GADOBENATE DIMEGLUMINE 529 MG/ML SOLUTION: Performed by: NEUROLOGICAL SURGERY

## 2018-09-27 PROCEDURE — 82565 ASSAY OF CREATININE: CPT

## 2018-09-27 PROCEDURE — 99213 OFFICE O/P EST LOW 20 MIN: CPT | Performed by: NEUROLOGICAL SURGERY

## 2018-09-27 RX ORDER — DULOXETIN HYDROCHLORIDE 30 MG/1
60 CAPSULE, DELAYED RELEASE ORAL
COMMUNITY
Start: 2018-09-25 | End: 2020-09-24 | Stop reason: ALTCHOICE

## 2018-09-27 RX ORDER — CARVEDILOL 6.25 MG/1
TABLET ORAL
COMMUNITY
Start: 2018-09-25 | End: 2020-01-23 | Stop reason: DRUGHIGH

## 2018-09-27 RX ADMIN — GADOBENATE DIMEGLUMINE 17 ML: 529 INJECTION, SOLUTION INTRAVENOUS at 13:22

## 2018-09-27 NOTE — PATIENT INSTRUCTIONS
PATIENT TO CONTINUE TO FOLLOW UP WITH HIS PRIMARY CARE PROVIDER FOR YEARLY PHYSICAL EXAMS TO ENSURE COMPLETE HEALTH MAINTENANCE

## 2018-09-27 NOTE — PROGRESS NOTES
SUBJECTIVE:  Patient ID: Asher Torres is a 72 y.o. male is here today for follow-up.    Chief Complaint: Meningioma  Chief Complaint   Patient presents with   • Meningioma     patient had MRI today @ [facility] and is here for results       HPI  72-year-old gentleman with a history of right frontal meningioma and Parkinson's disease.  He has no new complaints.  He is on Sinemet with good response.  He does feel that he is a little slower in his actions.  But his tremors under good control.    The following portions of the patient's history were reviewed and updated as appropriate: allergies, current medications, past family history, past medical history, past social history, past surgical history and problem list.    OBJECTIVE:    Review of Systems   Musculoskeletal: Positive for gait problem.   All other systems reviewed and are negative.         Physical Exam   Constitutional: He is oriented to person, place, and time.   Neurological: He is oriented to person, place, and time. He has normal strength. He has a normal Finger-Nose-Finger Test.       Neurologic Exam     Mental Status   Oriented to person, place, and time.   Level of consciousness: alert  Madi phrenic, mask face     Cranial Nerves   Cranial nerves II through XII intact.     Motor Exam   Muscle bulk: normal  Overall muscle tone: normal    Strength   Strength 5/5 throughout. Bradykinetic     Sensory Exam   Light touch normal.   Pinprick normal.     Gait, Coordination, and Reflexes     Gait  Gait: (Slow mildly unsteady)    Coordination   Finger to nose coordination: normal    Tremor   Resting tremor: absent  Intention tremor: absent  Action tremor: absent      Independent Review of Radiographic Studies:   MRI of the brain with and without contrast shows a stable right frontal meningioma.    ASSESSMENT/PLAN:  The patient is done well.  His imaging is stable.  He is a symptomatic from this meningioma.  His Parkinson's seems to be under good control.  We will  see him in follow-up in 1 year      No diagnosis found.        No Follow-up on file.      Jimmy Raphael MD

## 2018-10-29 RX ORDER — DULOXETIN HYDROCHLORIDE 30 MG/1
30 CAPSULE, DELAYED RELEASE ORAL DAILY
Qty: 30 CAPSULE | Refills: 5 | Status: SHIPPED | OUTPATIENT
Start: 2018-10-29 | End: 2019-05-01 | Stop reason: SDUPTHER

## 2018-12-03 ENCOUNTER — OFFICE VISIT (OUTPATIENT)
Dept: NEUROLOGY | Age: 73
End: 2018-12-03
Payer: MEDICARE

## 2018-12-03 VITALS
HEART RATE: 71 BPM | BODY MASS INDEX: 24.03 KG/M2 | OXYGEN SATURATION: 95 % | HEIGHT: 72 IN | WEIGHT: 177.4 LBS | DIASTOLIC BLOOD PRESSURE: 118 MMHG | SYSTOLIC BLOOD PRESSURE: 173 MMHG

## 2018-12-03 DIAGNOSIS — G62.9 NEUROPATHY: ICD-10-CM

## 2018-12-03 DIAGNOSIS — G25.81 RESTLESS LEG SYNDROME: ICD-10-CM

## 2018-12-03 DIAGNOSIS — G20 PARKINSON DISEASE (HCC): Primary | ICD-10-CM

## 2018-12-03 DIAGNOSIS — Z86.011 HISTORY OF MENINGIOMA OF THE BRAIN: ICD-10-CM

## 2018-12-03 PROBLEM — Z78.9 NON-SMOKER: Status: ACTIVE | Noted: 2017-03-29

## 2018-12-03 PROBLEM — N32.81 OAB (OVERACTIVE BLADDER): Status: ACTIVE | Noted: 2017-10-19

## 2018-12-03 PROBLEM — D32.9 BENIGN MENINGIOMA (HCC): Status: ACTIVE | Noted: 2017-03-29

## 2018-12-03 PROBLEM — R41.3 MEMORY DIFFICULTIES: Status: ACTIVE | Noted: 2017-03-29

## 2018-12-03 PROCEDURE — G8484 FLU IMMUNIZE NO ADMIN: HCPCS | Performed by: PSYCHIATRY & NEUROLOGY

## 2018-12-03 PROCEDURE — 1036F TOBACCO NON-USER: CPT | Performed by: PSYCHIATRY & NEUROLOGY

## 2018-12-03 PROCEDURE — 4040F PNEUMOC VAC/ADMIN/RCVD: CPT | Performed by: PSYCHIATRY & NEUROLOGY

## 2018-12-03 PROCEDURE — 99214 OFFICE O/P EST MOD 30 MIN: CPT | Performed by: PSYCHIATRY & NEUROLOGY

## 2018-12-03 PROCEDURE — 1101F PT FALLS ASSESS-DOCD LE1/YR: CPT | Performed by: PSYCHIATRY & NEUROLOGY

## 2018-12-03 PROCEDURE — G8420 CALC BMI NORM PARAMETERS: HCPCS | Performed by: PSYCHIATRY & NEUROLOGY

## 2018-12-03 PROCEDURE — 3017F COLORECTAL CA SCREEN DOC REV: CPT | Performed by: PSYCHIATRY & NEUROLOGY

## 2018-12-03 PROCEDURE — G8427 DOCREV CUR MEDS BY ELIG CLIN: HCPCS | Performed by: PSYCHIATRY & NEUROLOGY

## 2018-12-03 PROCEDURE — 1123F ACP DISCUSS/DSCN MKR DOCD: CPT | Performed by: PSYCHIATRY & NEUROLOGY

## 2018-12-03 RX ORDER — ACETAMINOPHEN 325 MG/1
650 TABLET ORAL PRN
Status: ON HOLD | COMMUNITY
End: 2020-03-05 | Stop reason: HOSPADM

## 2018-12-03 RX ORDER — CARVEDILOL 6.25 MG/1
6.25 TABLET ORAL NIGHTLY
Status: ON HOLD | COMMUNITY
Start: 2018-09-25 | End: 2020-03-05 | Stop reason: HOSPADM

## 2018-12-04 NOTE — PROGRESS NOTES
 Non-smoker 03/29/2017    Normal body mass index (BMI) 03/29/2017    OAB (overactive bladder) 10/19/2017    Primary Parkinsonism ([facility] 03/29/2017    Prostate cancer ([facility] 10/10/2016    BARBARA (stress urinary incontinence), male 10/10/2016     Resolved Ambulatory Problems     Diagnosis Date Noted    No Resolved Ambulatory Problems     Past Medical History:   Diagnosis Date    Cancer ([facility]     Neuropathy     Parkinson disease ([facility]     BARBARA (stress urinary incontinence), male 10/10/2016    Vitamin B12 deficiency        Past Surgical History:   Procedure Laterality Date    BACK SURGERY      MOHS SURGERY      NECK SURGERY      PROSTATECTOMY         History reviewed. No pertinent family history. Allergies   Allergen Reactions    Finasteride     Other      ANTIHISTAMINES - TYPE UNKNOWN  ANTIHISTAMINES - TYPE UNKNOWN  ANTIHISTAMINES - TYPE UNKNOWN    Sulfa Antibiotics        Social History     Social History    Marital status:      Spouse name: N/A    Number of children: N/A    Years of education: N/A     Occupational History    Not on file.      Social History Main Topics    Smoking status: Never Smoker    Smokeless tobacco: Never Used    Alcohol use No    Drug use: No    Sexual activity: Not on file     Other Topics Concern    Not on file     Social History Narrative    No narrative on file       Review of Systems     Constitutional: []Fever []Sweats []Chills [] Recent Injury   [x] Denies all unless marked  HENT:[]Headache  [] Head Injury  [] Sore Throat  [] Ear Pain  [x] Dizziness [] Hearing Loss   [] Denies all unless marked  Spine:  [] Neck pain  [] Back pain  [] Sciaticia  [x] Denies all unless marked  Cardiovascular:[]Chest Pain []Palpitations [] Heart Disease  [x] Denies all unless marked  Pulmonary: []Shortness of Breath []Cough   [x] Denies all unless marked  Gastrointestinal:  []Abdominal Pain  []Blood in Stool  []Diarrhea []Constipation []Nausea  []Vomiting  [x] Denies all unless marked  Genitourinary:  [] Dysuria [] Frequency  [] Incontinence [] Urgency   [x] Denies all unless marked  Musculoskeletal: [] Arthralgia  [] Myalgias [x] Muscle cramps  [] Muscle twitches   [] Denies all unless marked   Extremities:   [] Pain   [] Swelling   [x] Denies all unless marked  Skin:[] Rash  [] Color Change  [x] Denies all unless marked  Neurological:[] Visual Disturbance [] Double Vision [x] Slurred Speech [] Trouble swallowing  [x] Vertigo [] Tingling [] Numbness [] Weakness [] Loss of Balance   [] Loss of Consciousness [] Memory Loss [] Seizures  [] Denies all unless marked  Psychiatric/Behavioral:[] Depression [] Anxiety  [x] Denies all unless marked  Sleep: []  Insomnia [] Sleep Disturbance [] Snoring [x] Restless Legs [] Daytime Sleepiness [] Sleep Apnea  [] Denies all unless marked       Current Outpatient Prescriptions   Medication Sig Dispense Refill    acetaminophen (TYLENOL) 325 MG tablet Take 650 mg by mouth as needed for Pain      carvedilol (COREG) 6.25 MG tablet Take 6.25 mg by mouth nightly      Carbidopa-Levodopa ER (RYTARY) 36. MG CPCR Take 1 tablet by mouth 3 times daily 90 capsule 5    DULoxetine (CYMBALTA) 30 MG extended release capsule TAKE 1 CAPSULE BY MOUTH DAILY 30 capsule 5    carbidopa-levodopa (SINEMET)  MG per tablet TAKE 1 TABLET 4 TIMES DAILY 360 tablet 0    Cyanocobalamin (VITAMIN B-12 IJ) Inject as directed every 30 days      aspirin 325 MG tablet Take 325 mg by mouth as needed for Pain      Probiotic Product (PROBIOTIC ADVANCED PO) Take by mouth       No current facility-administered medications for this visit. BP (!) 173/118 (Site: Right Upper Arm, Position: Sitting, Cuff Size: Small Adult)   Pulse 71   Ht 6' (1.829 m)   Wt 177 lb 6.4 oz (80.5 kg)   SpO2 95%   BMI 24.06 kg/m²       Constitutional - well developed, well nourished.     Eyes - conjunctiva normal.  Pupils react to light  Ear, nose, throat -hearing intact to finger

## 2019-02-13 DIAGNOSIS — C61 PROSTATE CANCER (HCC): Primary | ICD-10-CM

## 2019-02-18 ENCOUNTER — RESULTS ENCOUNTER (OUTPATIENT)
Dept: UROLOGY | Facility: CLINIC | Age: 74
End: 2019-02-18

## 2019-02-18 DIAGNOSIS — C61 PROSTATE CANCER (HCC): ICD-10-CM

## 2019-02-21 ENCOUNTER — OFFICE VISIT (OUTPATIENT)
Dept: UROLOGY | Facility: CLINIC | Age: 74
End: 2019-02-21

## 2019-02-21 VITALS — HEIGHT: 72 IN | TEMPERATURE: 97 F | BODY MASS INDEX: 23.86 KG/M2 | WEIGHT: 176.2 LBS

## 2019-02-21 DIAGNOSIS — C61 PROSTATE CANCER (HCC): Primary | ICD-10-CM

## 2019-02-21 DIAGNOSIS — N39.3 SUI (STRESS URINARY INCONTINENCE), MALE: ICD-10-CM

## 2019-02-21 DIAGNOSIS — C61 PROSTATE CANCER (HCC): ICD-10-CM

## 2019-02-21 DIAGNOSIS — N32.81 OAB (OVERACTIVE BLADDER): ICD-10-CM

## 2019-02-21 LAB
BILIRUB BLD-MCNC: NEGATIVE MG/DL
CLARITY, POC: CLEAR
COLOR UR: YELLOW
GLUCOSE UR STRIP-MCNC: NEGATIVE MG/DL
KETONES UR QL: NEGATIVE
LEUKOCYTE EST, POC: NEGATIVE
NITRITE UR-MCNC: NEGATIVE MG/ML
PH UR: 5.5 [PH] (ref 5–8)
PROT UR STRIP-MCNC: NEGATIVE MG/DL
RBC # UR STRIP: NEGATIVE /UL
SP GR UR: 1.02 (ref 1–1.03)
UROBILINOGEN UR QL: NORMAL

## 2019-02-21 PROCEDURE — 99213 OFFICE O/P EST LOW 20 MIN: CPT | Performed by: UROLOGY

## 2019-02-21 PROCEDURE — 81001 URINALYSIS AUTO W/SCOPE: CPT | Performed by: UROLOGY

## 2019-02-21 NOTE — PROGRESS NOTES
Subjective    Mr. Torres is 73 y.o. male    CHIEF COMPLAINT: Prostate cancer    Patient comes back today for follow-up of prostate cancer he is about 5-1/2 years now status post robot-assisted radical prostatectomy with Dr. Sterling he was a Leelee's grade 6 (3+3) T1c he has done well as far as evidence of recurrence and his PSA remains 0.    He has no symptoms of metastatic disease such as bone pain back pain weight loss or anorexia.    He does have some incontinence we have tried him on some anticholinergic medications of Myrbetriq without a lot of success at this point in time he still has incontinence but he says is not bad enough to worry about.    I have discussed with him previously and again I mentioned today regarding a possible sphincter but he is not interested at this time      History of Present Illness      The following portions of the patient's history were reviewed and updated as appropriate: allergies, current medications, past family history, past medical history, past social history, past surgical history and problem list.    Review of Systems   Constitutional: Negative.  Negative for chills and fever.   Gastrointestinal: Negative for abdominal distention, abdominal pain, anal bleeding, blood in stool and nausea.   Genitourinary: Positive for frequency and urgency. Negative for difficulty urinating, dysuria, flank pain and hematuria.   Psychiatric/Behavioral: Negative.  Negative for agitation and confusion.         Current Outpatient Medications:   •  aspirin 325 MG tablet, Take  by mouth., Disp: , Rfl:   •  carbidopa-levodopa (SINEMET)  MG per tablet, TAKE 1 TABLET BY MOUTH BEFORE MEALS AND EVERY DAY AT BEDTIME, Disp: , Rfl: 12  •  carvedilol (COREG) 6.25 MG tablet, , Disp: , Rfl:   •  DULoxetine (CYMBALTA) 30 MG capsule, , Disp: , Rfl:   •  hydrocortisone 2.5 % cream, APPLY TO AFFECTED AREA 4 TIMES A DAY, Disp: , Rfl: 0  •  Probiotic Product (PROBIOTIC ADVANCED PO), Take  by mouth., Disp: ,  "Rfl:     Past Medical History:   Diagnosis Date   • BPH with urinary obstruction    • Coronary artery disease     WITH HISTORY OF MYOCARDIAL INFARCTION W/OUT HISTORY OF CABG   • Diverticulitis of colon    • Intrinsic sphincter deficiency (ISD)    • Myocardial infarction (CMS/HCC)    • Nocturia    • Parkinson's disease (CMS/HCC) march 2018   • Prostate cancer (CMS/HCC)    • Renal cyst, right    • Urge incontinence    • Urinary frequency        Past Surgical History:   Procedure Laterality Date   • BACK SURGERY     • CARDIAC CATHETERIZATION     • NECK SURGERY     • PROSTATE SURGERY         Social History     Socioeconomic History   • Marital status:      Spouse name: Not on file   • Number of children: Not on file   • Years of education: Not on file   • Highest education level: Not on file   Tobacco Use   • Smoking status: Never Smoker   • Smokeless tobacco: Never Used   Substance and Sexual Activity   • Alcohol use: No   • Drug use: No   • Sexual activity: Defer       Family History   Problem Relation Age of Onset   • Colon cancer Brother    • No Known Problems Mother    • No Known Problems Father        Objective    Temp 97 °F (36.1 °C)   Ht 182.9 cm (72\")   Wt 79.9 kg (176 lb 3.2 oz)   BMI 23.90 kg/m²     Physical Exam      Hospital Outpatient Visit on 09/27/2018   Component Date Value Ref Range Status   • Creatinine 09/27/2018 0.90  0.60 - 1.30 mg/dL Final    Serial Number: [id number]:  [id number]       Results for orders placed or performed in visit on 02/21/19   POC Urinalysis Dipstick, Multipro   Result Value Ref Range    Color Yellow Yellow, Straw, Dark Yellow, Angela    Clarity, UA Clear Clear    Glucose, UA Negative Negative, 1000 mg/dL (3+) mg/dL    Bilirubin Negative Negative    Ketones, UA Negative Negative    Specific Gravity  1.025 1.005 - 1.030    Blood, UA Negative Negative    pH, Urine 5.5 5.0 - 8.0    Protein, POC Negative Negative mg/dL    Urobilinogen, UA Normal Normal    Nitrite, UA " Negative Negative    Leukocytes Negative Negative       Assessment and Plan    Diagnoses and all orders for this visit:    Prostate cancer (CMS/HCC)  -     POC Urinalysis Dipstick, Multipro  -     PSA DIAGNOSTIC; Future    OAB (overactive bladder)  -     POC Urinalysis Dipstick, Multipro    CARIDAD (stress urinary incontinence), male    Plan    The patient is doing well from prostate cancer point of view we will see him back in the office again in 1 year with a PSA    Again as far as his incontinence is concerned he does not want to think at this point if he changes his mind or would like something that we have him see Dr. Kolb in the future

## 2019-04-05 ENCOUNTER — OFFICE VISIT (OUTPATIENT)
Dept: NEUROLOGY | Age: 74
End: 2019-04-05
Payer: MEDICARE

## 2019-04-05 VITALS
HEART RATE: 87 BPM | HEIGHT: 72 IN | BODY MASS INDEX: 23.84 KG/M2 | WEIGHT: 176 LBS | DIASTOLIC BLOOD PRESSURE: 107 MMHG | SYSTOLIC BLOOD PRESSURE: 154 MMHG

## 2019-04-05 DIAGNOSIS — G25.81 RESTLESS LEG SYNDROME: ICD-10-CM

## 2019-04-05 DIAGNOSIS — G62.9 NEUROPATHY: ICD-10-CM

## 2019-04-05 DIAGNOSIS — G20 PARKINSON DISEASE (HCC): Primary | ICD-10-CM

## 2019-04-05 PROCEDURE — 4040F PNEUMOC VAC/ADMIN/RCVD: CPT | Performed by: PSYCHIATRY & NEUROLOGY

## 2019-04-05 PROCEDURE — 3017F COLORECTAL CA SCREEN DOC REV: CPT | Performed by: PSYCHIATRY & NEUROLOGY

## 2019-04-05 PROCEDURE — 1123F ACP DISCUSS/DSCN MKR DOCD: CPT | Performed by: PSYCHIATRY & NEUROLOGY

## 2019-04-05 PROCEDURE — 99214 OFFICE O/P EST MOD 30 MIN: CPT | Performed by: PSYCHIATRY & NEUROLOGY

## 2019-04-05 PROCEDURE — G8427 DOCREV CUR MEDS BY ELIG CLIN: HCPCS | Performed by: PSYCHIATRY & NEUROLOGY

## 2019-04-05 PROCEDURE — 1036F TOBACCO NON-USER: CPT | Performed by: PSYCHIATRY & NEUROLOGY

## 2019-04-05 PROCEDURE — G8420 CALC BMI NORM PARAMETERS: HCPCS | Performed by: PSYCHIATRY & NEUROLOGY

## 2019-04-05 NOTE — PROGRESS NOTES
REVIEW OF SYSTEMS    Constitutional: []Fever []Sweats []Chills [] Recent Injury   [x] Denies all unless marked  HENT:[]Headache  [] Head Injury  [] Sore Throat  [] Ear Pain  [] Dizziness [] Hearing Loss   [x] Denies all unless marked  Spine:  [] Neck pain  [] Back pain  [] Sciaticia  [x] Denies all unless marked  Cardiovascular:[]Chest Pain []Palpitations [] Heart Disease  [x] Denies all unless marked  Pulmonary: []Shortness of Breath []Cough   [x] Denies all unless marked  Gastrointestinal:  []Abdominal Pain  []Blood in Stool  []Diarrhea []Constipation []Nausea  []Vomiting  [x] Denies all unless marked  Genitourinary:  [] Dysuria [] Frequency  [] Incontinence [] Urgency   [x] Denies all unless marked  Musculoskeletal: [] Arthralgia  [] Myalgias [] Muscle cramps  [x] Muscle twitches   [] Denies all unless marked   Extremities:   [] Pain   [] Swelling   [x] Denies all unless marked  Skin:[] Rash  [] Color Change  [x] Denies all unless marked  Neurological:[x] Visual Disturbance [] Double Vision [x] Slurred Speech [] Trouble swallowing  [] Vertigo [] Tingling [] Numbness [x] Weakness [x] Loss of Balance   [] Loss of Consciousness [x] Memory Loss [] Seizures  [] Denies all unless marked  Psychiatric/Behavioral:[] Depression [] Anxiety  [x] Denies all unless marked  Sleep: []  Insomnia [] Sleep Disturbance [] Snoring [] Restless Legs [] Daytime Sleepiness [] Sleep Apnea  [x] Denies all unless marked

## 2019-04-05 NOTE — PROGRESS NOTES
(stress urinary incontinence), male 10/10/2016     Resolved Ambulatory Problems     Diagnosis Date Noted    No Resolved Ambulatory Problems     Past Medical History:   Diagnosis Date    Cancer ([facility] [address] [facility]     Neuropathy     Parkinson disease ([facility] [address] [facility]     BARBARA (stress urinary incontinence), male 10/10/2016    Vitamin B12 deficiency        Past Surgical History:   Procedure Laterality Date    BACK SURGERY      MOHS SURGERY      NECK SURGERY      PROSTATECTOMY         History reviewed. No pertinent family history.     Allergies   Allergen Reactions    Finasteride     Other      ANTIHISTAMINES - TYPE UNKNOWN  ANTIHISTAMINES - TYPE UNKNOWN  ANTIHISTAMINES - TYPE UNKNOWN    Sulfa Antibiotics        Social History     Socioeconomic History    Marital status:      Spouse name: Not on file    Number of children: Not on file    Years of education: Not on file    Highest education level: Not on file   Occupational History    Not on file   Social Needs    Financial resource strain: Not on file    Food insecurity:     Worry: Not on file     Inability: Not on file    Transportation needs:     Medical: Not on file     Non-medical: Not on file   Tobacco Use    Smoking status: Never Smoker    Smokeless tobacco: Never Used   Substance and Sexual Activity    Alcohol use: No    Drug use: No    Sexual activity: Not on file   Lifestyle    Physical activity:     Days per week: Not on file     Minutes per session: Not on file    Stress: Not on file   Relationships    Social connections:     Talks on phone: Not on file     Gets together: Not on file     Attends Cheondoism service: Not on file     Active member of club or organization: Not on file     Attends meetings of clubs or organizations: Not on file     Relationship status: Not on file    Intimate partner violence:     Fear of current or ex partner: Not on file     Emotionally abused: Not on file     Physically abused: Not on file     Forced sexual activity: Not on file   Other Topics Concern    Not on file   Social History Narrative    Not on file       Review of Systems     Constitutional: []Fever []Sweats []Chills [] Recent Injury   [x] Denies all unless marked  HENT:[]Headache  [] Head Injury  [] Sore Throat  [] Ear Pain  [] Dizziness [] Hearing Loss   [x] Denies all unless marked  Spine:  [] Neck pain  [] Back pain  [] Sciaticia  [x] Denies all unless marked  Cardiovascular:[]Chest Pain []Palpitations [] Heart Disease  [x] Denies all unless marked  Pulmonary: []Shortness of Breath []Cough   [x] Denies all unless marked  Gastrointestinal:  []Abdominal Pain  []Blood in Stool  []Diarrhea []Constipation []Nausea  []Vomiting  [x] Denies all unless marked  Genitourinary:  [] Dysuria [] Frequency  [] Incontinence [] Urgency   [x] Denies all unless marked  Musculoskeletal: [] Arthralgia  [] Myalgias [] Muscle cramps  [x] Muscle twitches   [] Denies all unless marked   Extremities:   [] Pain   [] Swelling   [x] Denies all unless marked  Skin:[] Rash  [] Color Change  [x] Denies all unless marked  Neurological:[x] Visual Disturbance [] Double Vision [x] Slurred Speech [] Trouble swallowing  [] Vertigo [] Tingling [] Numbness [x] Weakness [x] Loss of Balance   [] Loss of Consciousness [x] Memory Loss [] Seizures  [] Denies all unless marked  Psychiatric/Behavioral:[] Depression [] Anxiety  [x] Denies all unless marked  Sleep: []  Insomnia [] Sleep Disturbance [] Snoring [] Restless Legs [] Daytime Sleepiness [] Sleep Apnea  [x] Denies all unless marked                  Current Outpatient Medications   Medication Sig Dispense Refill    acetaminophen (TYLENOL) 325 MG tablet Take 650 mg by mouth as needed for Pain      carvedilol (COREG) 6.25 MG tablet Take 6.25 mg by mouth nightly      Carbidopa-Levodopa ER (RYTARY) 36. MG CPCR Take 1 tablet by mouth 3 times daily 90 capsule 5    DULoxetine (CYMBALTA) 30 MG extended release capsule TAKE 1 CAPSULE BY MOUTH DAILY 30 on the right Turns en bloc  Thoracic kyphosis noted  Coordination  Finger to nose-unremarkable          Assessment    ICD-10-CM    1. Parkinson disease [facility]) G20 External Referral To Physical Therapy     External Referral To Speech Therapy   2. Restless leg syndrome G25.81    3. Neuropathy G62.9      Neuropathy and restless legs are stable. Meningioma is presumably stable. His Parkinson's disease may be a little worse. I think it's because he is undermedicated. They share some concern that the [address] maybe too expensive. We will investigate that. The meantime he will increase his Rytary to one pill 3 times a day and ultimately to 2 pills 3 times a day. Plan        Return in about 3 months (around 7/5/2019).     (Please note that portions of this note were completed with a voice recognition program. Efforts were made to edit the dictations but occasionally words are mis-transcribed.)

## 2019-04-09 ENCOUNTER — TELEPHONE (OUTPATIENT)
Dept: NEUROLOGY | Age: 74
End: 2019-04-09

## 2019-04-10 DIAGNOSIS — G20 PARKINSON DISEASE (HCC): Primary | ICD-10-CM

## 2019-04-10 NOTE — TELEPHONE ENCOUNTER
Patient is requesting a printed 90 day supply prescription, I have T'd that up for you however she states the dose they had given us at the appt was incorrect so I have updated that to the information she just gave me. Please double check that it is ok with you. She states patient was taking 2 pills per day and you wanted to increase to 3 at the dose reflected. Please let me know if I need to change this.

## 2019-04-11 RX ORDER — CARBIDOPA AND LEVODOPA 50; 200 MG/1; MG/1
3 TABLET, EXTENDED RELEASE ORAL DAILY
Qty: 270 TABLET | Refills: 3 | Status: SHIPPED | OUTPATIENT
Start: 2019-04-11 | End: 2019-08-05 | Stop reason: SDUPTHER

## 2019-05-01 RX ORDER — DULOXETIN HYDROCHLORIDE 30 MG/1
30 CAPSULE, DELAYED RELEASE ORAL DAILY
Qty: 30 CAPSULE | Refills: 11 | Status: SHIPPED | OUTPATIENT
Start: 2019-05-01 | End: 2020-02-07

## 2019-05-01 NOTE — TELEPHONE ENCOUNTER
Requested Prescriptions     Pending Prescriptions Disp Refills    DULoxetine (CYMBALTA) 30 MG extended release capsule [Pharmacy Med Name: DULOXETINE HCL DR 30 MG CAP] 30 capsule 5     Sig: TAKE 1 CAPSULE BY MOUTH DAILY     Last OV  4/5/19  Next OV  7/5/19  Last Rx   10/29/18 with 5 refills

## 2019-05-02 ENCOUNTER — TELEPHONE (OUTPATIENT)
Dept: NEUROLOGY | Age: 74
End: 2019-05-02

## 2019-05-02 NOTE — TELEPHONE ENCOUNTER
Patients daughter Emerald Kovacs called the office and left a voicemail ask that Clara Kang return her call regarding her father her # [phone], stated if you do not get her then call patients wife Ruth Morales # [phone]. This is in regards to the patients Parkinson's.

## 2019-07-25 ENCOUNTER — TELEPHONE (OUTPATIENT)
Dept: NEUROLOGY | Age: 74
End: 2019-07-25

## 2019-08-05 ENCOUNTER — OFFICE VISIT (OUTPATIENT)
Dept: NEUROLOGY | Age: 74
End: 2019-08-05
Payer: MEDICARE

## 2019-08-05 VITALS
DIASTOLIC BLOOD PRESSURE: 100 MMHG | WEIGHT: 173 LBS | HEIGHT: 72 IN | HEART RATE: 63 BPM | SYSTOLIC BLOOD PRESSURE: 179 MMHG | BODY MASS INDEX: 23.43 KG/M2

## 2019-08-05 DIAGNOSIS — G20 PARKINSON DISEASE (HCC): Primary | ICD-10-CM

## 2019-08-05 DIAGNOSIS — Z86.011 HISTORY OF MENINGIOMA OF THE BRAIN: ICD-10-CM

## 2019-08-05 DIAGNOSIS — G25.81 RESTLESS LEG SYNDROME: ICD-10-CM

## 2019-08-05 DIAGNOSIS — G62.9 NEUROPATHY: ICD-10-CM

## 2019-08-05 PROCEDURE — G8427 DOCREV CUR MEDS BY ELIG CLIN: HCPCS | Performed by: PSYCHIATRY & NEUROLOGY

## 2019-08-05 PROCEDURE — 4040F PNEUMOC VAC/ADMIN/RCVD: CPT | Performed by: PSYCHIATRY & NEUROLOGY

## 2019-08-05 PROCEDURE — G8420 CALC BMI NORM PARAMETERS: HCPCS | Performed by: PSYCHIATRY & NEUROLOGY

## 2019-08-05 PROCEDURE — 99214 OFFICE O/P EST MOD 30 MIN: CPT | Performed by: PSYCHIATRY & NEUROLOGY

## 2019-08-05 PROCEDURE — 1123F ACP DISCUSS/DSCN MKR DOCD: CPT | Performed by: PSYCHIATRY & NEUROLOGY

## 2019-08-05 PROCEDURE — 1036F TOBACCO NON-USER: CPT | Performed by: PSYCHIATRY & NEUROLOGY

## 2019-08-05 PROCEDURE — 3017F COLORECTAL CA SCREEN DOC REV: CPT | Performed by: PSYCHIATRY & NEUROLOGY

## 2019-08-05 RX ORDER — LISINOPRIL 2.5 MG/1
1 TABLET ORAL DAILY
Refills: 3 | Status: ON HOLD | COMMUNITY
Start: 2019-05-22 | End: 2020-03-05 | Stop reason: HOSPADM

## 2019-08-05 RX ORDER — CARBIDOPA AND LEVODOPA 50; 200 MG/1; MG/1
3 TABLET, EXTENDED RELEASE ORAL DAILY
Qty: 270 TABLET | Refills: 3 | Status: ON HOLD | OUTPATIENT
Start: 2019-08-05 | End: 2020-03-05 | Stop reason: HOSPADM

## 2019-08-05 RX ORDER — DULOXETIN HYDROCHLORIDE 60 MG/1
60 CAPSULE, DELAYED RELEASE ORAL DAILY
Qty: 90 CAPSULE | Refills: 5 | Status: SHIPPED | OUTPATIENT
Start: 2019-08-05 | End: 2020-02-07 | Stop reason: SDUPTHER

## 2019-08-05 NOTE — PROGRESS NOTES
carvedilol (COREG) 6.25 MG tablet Take 6.25 mg by mouth nightly      lisinopril (PRINIVIL;ZESTRIL) 2.5 MG tablet Take 1 tablet by mouth daily  3     No current facility-administered medications for this visit. BP (!) 179/100 (Site: Right Upper Arm)   Pulse 63   Ht 6' (1.829 m)   Wt 173 lb (78.5 kg)   BMI 23.46 kg/m²       Constitutional - well developed, well nourished. Eyes - conjunctiva normal.  Pupils react to light  Ear, nose, throat -hearing intact to finger rub No scars, masses, or lesions over external nose or ears, no atrophy of tongue  Neck-symmetric, no masses noted, no jugular vein distension. No bruits noted. Respiration- chest wall appears symmetric, good expansion,   normal effort without use of accessory muscles  Cardiovascular- RRR  Musculoskeletal - no significant wasting of muscles noted, no bony deformities, gait no gross ataxia  Extremities-no clubbing, cyanosis or edema  Skin - warm, dry, and intact. No rash, erythema, or pallor. Psychiatric - mood, affect, and behavior appear normal.      Neurological exam  Awake, alert, fluent oriented x 3 appropriate affect  Attention and concentration appear appropriate  Short-term memory 3/4 at 5 minutes. 4/4 with cues. Remote memory was good. Her clock draw. Followed complex commands well. Speech shows hypophonia but no dysarthria  No clear issues with language of fund of knowledge    Cranial Nerve Exam   CN II- Visual fields grossly unremarkable. VA adequate. PERRLA. CN III, IV,VI-EOMI, No nystagmus, conjugate eye movements, no ptosis  CN VII-no facial asymmetry. He has a masked face with decreased blink rate  CN VIII-Hearing intact       Motor Exam  V/V throughout upper and lower extremities bilaterally, has bradykinesia which is worse on the Right. Some cogwheeling is seen on the right. Reflexes   Absent throughout      Tremors- no tremors in hands or head noted    Gait  Gait was slowed and slightly unsteady.  Decreased arm

## 2019-09-26 DIAGNOSIS — R41.3 MEMORY DIFFICULTIES: ICD-10-CM

## 2019-09-26 DIAGNOSIS — D32.9 BENIGN MENINGIOMA (HCC): Primary | ICD-10-CM

## 2019-10-07 ENCOUNTER — OFFICE VISIT (OUTPATIENT)
Dept: NEUROLOGY | Age: 74
End: 2019-10-07
Payer: MEDICARE

## 2019-10-07 VITALS
SYSTOLIC BLOOD PRESSURE: 163 MMHG | HEART RATE: 65 BPM | WEIGHT: 177 LBS | BODY MASS INDEX: 23.98 KG/M2 | HEIGHT: 72 IN | DIASTOLIC BLOOD PRESSURE: 104 MMHG

## 2019-10-07 DIAGNOSIS — G62.9 NEUROPATHY: ICD-10-CM

## 2019-10-07 DIAGNOSIS — Z86.011 HISTORY OF MENINGIOMA OF THE BRAIN: ICD-10-CM

## 2019-10-07 DIAGNOSIS — G25.81 RESTLESS LEG SYNDROME: ICD-10-CM

## 2019-10-07 DIAGNOSIS — G20 PARKINSON DISEASE (HCC): Primary | ICD-10-CM

## 2019-10-07 PROCEDURE — G8484 FLU IMMUNIZE NO ADMIN: HCPCS | Performed by: PSYCHIATRY & NEUROLOGY

## 2019-10-07 PROCEDURE — G8420 CALC BMI NORM PARAMETERS: HCPCS | Performed by: PSYCHIATRY & NEUROLOGY

## 2019-10-07 PROCEDURE — 3017F COLORECTAL CA SCREEN DOC REV: CPT | Performed by: PSYCHIATRY & NEUROLOGY

## 2019-10-07 PROCEDURE — 4040F PNEUMOC VAC/ADMIN/RCVD: CPT | Performed by: PSYCHIATRY & NEUROLOGY

## 2019-10-07 PROCEDURE — 99214 OFFICE O/P EST MOD 30 MIN: CPT | Performed by: PSYCHIATRY & NEUROLOGY

## 2019-10-07 PROCEDURE — 1123F ACP DISCUSS/DSCN MKR DOCD: CPT | Performed by: PSYCHIATRY & NEUROLOGY

## 2019-10-07 PROCEDURE — G8427 DOCREV CUR MEDS BY ELIG CLIN: HCPCS | Performed by: PSYCHIATRY & NEUROLOGY

## 2019-10-07 PROCEDURE — 1036F TOBACCO NON-USER: CPT | Performed by: PSYCHIATRY & NEUROLOGY

## 2019-10-14 DIAGNOSIS — D32.9 BENIGN MENINGIOMA (HCC): Primary | ICD-10-CM

## 2020-01-23 ENCOUNTER — OFFICE VISIT (OUTPATIENT)
Dept: NEUROSURGERY | Facility: CLINIC | Age: 75
End: 2020-01-23

## 2020-01-23 ENCOUNTER — HOSPITAL ENCOUNTER (OUTPATIENT)
Dept: MRI IMAGING | Facility: HOSPITAL | Age: 75
Discharge: HOME | End: 2020-01-23
Admitting: NEUROLOGICAL SURGERY

## 2020-01-23 VITALS
BODY MASS INDEX: 24.06 KG/M2 | DIASTOLIC BLOOD PRESSURE: 78 MMHG | HEIGHT: 72 IN | WEIGHT: 177.6 LBS | SYSTOLIC BLOOD PRESSURE: 126 MMHG

## 2020-01-23 DIAGNOSIS — Z78.9 NON-SMOKER: ICD-10-CM

## 2020-01-23 DIAGNOSIS — D32.9 BENIGN MENINGIOMA (HCC): ICD-10-CM

## 2020-01-23 DIAGNOSIS — R41.3 MEMORY DIFFICULTIES: ICD-10-CM

## 2020-01-23 DIAGNOSIS — D32.9 BENIGN MENINGIOMA (HCC): Primary | ICD-10-CM

## 2020-01-23 PROBLEM — IMO0001 NORMAL BODY MASS INDEX (BMI): Status: RESOLVED | Noted: 2017-03-29 | Resolved: 2020-01-23

## 2020-01-23 LAB — CREAT BLDA-MCNC: 1.1 MG/DL (ref 0.6–1.3)

## 2020-01-23 PROCEDURE — 82565 ASSAY OF CREATININE: CPT

## 2020-01-23 PROCEDURE — 0 GADOBENATE DIMEGLUMINE 529 MG/ML SOLUTION: Performed by: NEUROLOGICAL SURGERY

## 2020-01-23 PROCEDURE — A9577 INJ MULTIHANCE: HCPCS | Performed by: NEUROLOGICAL SURGERY

## 2020-01-23 PROCEDURE — 70553 MRI BRAIN STEM W/O & W/DYE: CPT

## 2020-01-23 PROCEDURE — 99213 OFFICE O/P EST LOW 20 MIN: CPT | Performed by: NEUROLOGICAL SURGERY

## 2020-01-23 RX ORDER — CARVEDILOL 6.25 MG/1
6.25 TABLET ORAL 2 TIMES DAILY
COMMUNITY
Start: 2019-12-04

## 2020-01-23 RX ORDER — OMEGA-3/DHA/EPA/FISH OIL 35-113.5MG
1000 TABLET,CHEWABLE ORAL DAILY
COMMUNITY
Start: 2019-12-13

## 2020-01-23 RX ORDER — CARBIDOPA AND LEVODOPA 50; 200 MG/1; MG/1
1 TABLET, EXTENDED RELEASE ORAL 4 TIMES DAILY
COMMUNITY
Start: 2019-12-03

## 2020-01-23 RX ADMIN — GADOBENATE DIMEGLUMINE 20 ML: 529 INJECTION, SOLUTION INTRAVENOUS at 11:11

## 2020-01-23 NOTE — PROGRESS NOTES
SUBJECTIVE:  Patient ID: Asher Torres is a 74 y.o. male is here today for follow-up.    Chief Complaint: Meningioma  Chief Complaint   Patient presents with   • Meningioma     patient is here for his yearly follow up with MRI @ [facility] today (patient had an appt in Oct 2019 but had to cx due to death in family).         HPI  74-year-old gentleman who we have been following since 2017 for a right convexity meningioma.  He does have a diagnosis of Parkinson's disease.  He has no new complaints other than some nonspecific chronic pain issues.  He has had some troubles with falls and walking.  He is here with repeat routine imaging of his brain.    The following portions of the patient's history were reviewed and updated as appropriate: allergies, current medications, past family history, past medical history, past social history, past surgical history and problem list.    OBJECTIVE:    Review of Systems   Musculoskeletal: Positive for gait problem.   Neurological: Positive for speech difficulty.   All other systems reviewed and are negative.         Physical Exam   Constitutional: He is oriented to person, place, and time.   Neurological: He is oriented to person, place, and time. He has normal strength. He has a normal Finger-Nose-Finger Test.       Neurologic Exam     Mental Status   Oriented to person, place, and time.   Level of consciousness: alert  Severe bradyphrenia     Cranial Nerves   Cranial nerves II through XII intact.     Motor Exam   Muscle bulk: normal  Overall muscle tone: normal  Right arm pronator drift: absent  Left arm pronator drift: absent    Strength   Strength 5/5 throughout.     Sensory Exam   Light touch normal.   Pinprick normal.     Gait, Coordination, and Reflexes     Gait  Gait: (Slow shuffling gait mildly unsteady)    Coordination   Finger to nose coordination: normal    Tremor   Resting tremor: absent  Intention tremor: absent  Action tremor: absent    Reflexes   Reflexes 2+ except as  noted.   Masked face  Bradykinetic    Independent Review of Radiographic Studies:       ASSESSMENT/PLAN:  MRI of the brain without contrast shows interval increase in size of the right convexity meningioma compared to 2017 and 2018.  At this point I would recommend consultation with Dr. Joseph for stereotactic radiation treatments to this tumor.  We discussed the relative risks and benefits of continued watchful waiting versus radiation treatments.  I think the risks of radiation right now are lower than the risk of no treatment.  The patient acknowledged understanding of this.  Their questions and concerns were addressed.  We will get him referred to Dr. Joseph and scheduled for follow-up imaging in about 8 months      1. Benign meningioma (CMS/HCC)    2. Memory difficulties    3. BMI 24.0-24.9, adult    4. Non-smoker            Return in about 8 months (around 9/23/2020) for follow up w/scan - DR SMIHT (BRAIN).      Jimmy Smith MD

## 2020-01-28 PROBLEM — Z71.9 ENCOUNTER FOR CONSULTATION: Status: ACTIVE | Noted: 2020-01-28

## 2020-01-29 PROBLEM — D32.9 MENINGIOMA (HCC): Status: ACTIVE | Noted: 2020-01-29

## 2020-01-30 ENCOUNTER — CONSULT (OUTPATIENT)
Dept: RADIATION ONCOLOGY | Facility: HOSPITAL | Age: 75
End: 2020-01-30

## 2020-01-30 ENCOUNTER — HOSPITAL ENCOUNTER (OUTPATIENT)
Dept: RADIATION ONCOLOGY | Facility: HOSPITAL | Age: 75
Setting detail: RADIATION/ONCOLOGY SERIES
End: 2020-01-30

## 2020-01-30 VITALS
BODY MASS INDEX: 23.84 KG/M2 | DIASTOLIC BLOOD PRESSURE: 70 MMHG | SYSTOLIC BLOOD PRESSURE: 120 MMHG | HEIGHT: 72 IN | WEIGHT: 176 LBS

## 2020-01-30 DIAGNOSIS — Z78.9 NON-SMOKER: ICD-10-CM

## 2020-01-30 DIAGNOSIS — D32.9 BENIGN MENINGIOMA (HCC): Primary | ICD-10-CM

## 2020-01-30 DIAGNOSIS — Z85.46 HX OF MALIGNANT NEOPLASM OF PROSTATE: ICD-10-CM

## 2020-01-30 PROCEDURE — 77334 RADIATION TREATMENT AID(S): CPT | Performed by: RADIOLOGY

## 2020-02-03 ENCOUNTER — HOSPITAL ENCOUNTER (OUTPATIENT)
Dept: RADIATION ONCOLOGY | Facility: HOSPITAL | Age: 75
Setting detail: RADIATION/ONCOLOGY SERIES
End: 2020-02-03

## 2020-02-03 PROBLEM — Z85.46 HX OF MALIGNANT NEOPLASM OF PROSTATE: Status: ACTIVE | Noted: 2020-02-03

## 2020-02-07 ENCOUNTER — OFFICE VISIT (OUTPATIENT)
Dept: NEUROLOGY | Age: 75
End: 2020-02-07
Payer: MEDICARE

## 2020-02-07 VITALS
HEIGHT: 72 IN | BODY MASS INDEX: 23.84 KG/M2 | DIASTOLIC BLOOD PRESSURE: 78 MMHG | SYSTOLIC BLOOD PRESSURE: 115 MMHG | WEIGHT: 176 LBS | HEART RATE: 64 BPM

## 2020-02-07 PROCEDURE — G8427 DOCREV CUR MEDS BY ELIG CLIN: HCPCS | Performed by: PSYCHIATRY & NEUROLOGY

## 2020-02-07 PROCEDURE — 1036F TOBACCO NON-USER: CPT | Performed by: PSYCHIATRY & NEUROLOGY

## 2020-02-07 PROCEDURE — G8420 CALC BMI NORM PARAMETERS: HCPCS | Performed by: PSYCHIATRY & NEUROLOGY

## 2020-02-07 PROCEDURE — 99214 OFFICE O/P EST MOD 30 MIN: CPT | Performed by: PSYCHIATRY & NEUROLOGY

## 2020-02-07 PROCEDURE — 3017F COLORECTAL CA SCREEN DOC REV: CPT | Performed by: PSYCHIATRY & NEUROLOGY

## 2020-02-07 PROCEDURE — 4040F PNEUMOC VAC/ADMIN/RCVD: CPT | Performed by: PSYCHIATRY & NEUROLOGY

## 2020-02-07 PROCEDURE — G8484 FLU IMMUNIZE NO ADMIN: HCPCS | Performed by: PSYCHIATRY & NEUROLOGY

## 2020-02-07 PROCEDURE — 1123F ACP DISCUSS/DSCN MKR DOCD: CPT | Performed by: PSYCHIATRY & NEUROLOGY

## 2020-02-07 RX ORDER — DULOXETIN HYDROCHLORIDE 60 MG/1
60 CAPSULE, DELAYED RELEASE ORAL DAILY
Qty: 90 CAPSULE | Refills: 5 | Status: ON HOLD | OUTPATIENT
Start: 2020-02-07 | End: 2020-03-05 | Stop reason: SDUPTHER

## 2020-02-07 NOTE — PROGRESS NOTES
Chief Complaint   Patient presents with    Follow-up     Patient is a 4mo follow up for parkinson's disease. Lolita Babb is a 75y.o. year old male who is seen for evaluation of Parkinson's disease and neuropathy. 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 Glucose tolerance test was unremarkable. SPEP was done elsewhere and I never received the results. . The big/loud program was helpful for him last year. Had been trying to get him on Rytary but it ended up being too complicated and expensive. He is now on CR 50/200, 3 times a day. He is having more difficulty getting up from a chair. He has trouble with time planning. .   I gave him samples of Inbrigia, but he never tried it. He has been able to tolerate Cymbalta 60 mg daily. Says his depression is much improved. He has done regular physical therapy since his last visit. He says his blood pressure is okay at home but it is a little low today. He does complain of intermittent dizziness and poor balance. Lisinopril 2.5 mg he takes Coreg 6.25 mg at night. The patient has a history of medication sensitivity. . He has a history of a benign cerebral meningioma for which she sees Dr. Marcy Adorno. Recently has been shown to grow in size and he is seen Dr. Huber Das and was contemplating stereotactic radiation in the next couple of months. We had a long talk regarding this. He really doesn't have much in the way of tremor but for the past year or so has had hypophonia, bradykinesia and a tendency to shuffle and not swinging his arms. He does complain of some chronic numbness in his feet and some trouble with memory. He does not have diabetes. Chuckie Rivka Restless leg symptoms are fairly well controlled. There is a family history of this. He tried [place] in the past but is no longer on it.    Active Ambulatory Problems     Diagnosis Date Noted    Neuropathy     Benign meningioma ([facility] [address] Intimate partner violence:     Fear of current or ex partner: Not on file     Emotionally abused: Not on file     Physically abused: Not on file     Forced sexual activity: Not on file   Other Topics Concern    Not on file   Social History Narrative    Not on file       Review of Systems  Constitutional: []? Fever []? Sweats []? Chills []? Recent Injury   [x]? Denies all unless marked  HENT:[]? Headache  []? Head Injury  []? Sore Throat  []? Ear Pain  []? Dizziness []? Hearing Loss   [x]? Denies all unless marked  Spine:  []? Neck pain  []? Back pain  []? Sciaticia  [x]? Denies all unless marked  Cardiovascular:[]? Chest Pain []? Palpitations []? Heart Disease  [x]? Denies all unless marked  Pulmonary: []? Shortness of Breath []? Cough   [x]? Denies all unless marked  Gastrointestinal:  []? Abdominal Pain  []? Blood in Stool  []? Diarrhea []? Constipation []? Nausea  []? Vomiting  [x]? Denies all unless marked  Genitourinary:  []? Dysuria []? Frequency  []? Incontinence []? Urgency   [x]? Denies all unless marked  Musculoskeletal: []? Arthralgia  []? Myalgias []? Muscle cramps  []? Muscle twitches   [x]? Denies all unless marked   Extremities:   []? Pain   []? Swelling   [x]? Denies all unless marked  Skin:[]? Rash  []? Color Change  [x]? Denies all unless marked  Neurological:[]? Visual Disturbance []? Double Vision []? Slurred Speech []? Trouble swallowing  []? Vertigo []? Tingling []? Numbness []? Weakness []? Loss of Balance   []? Loss of Consciousness []? Memory Loss []? Seizures  [x]? Denies all unless marked  Psychiatric/Behavioral:[]? Depression []? Anxiety  [x]? Denies all unless marked  Sleep: []? Insomnia []? Sleep Disturbance []? Snoring []? Restless Legs []? Daytime Sleepiness []? Sleep Apnea  [x]?  Denies all unless marked               Current Outpatient Medications   Medication Sig Dispense Refill    cyanocobalamin ([facility] VITAMIN B12) 1000 MCG tablet Take 1,000 mcg by mouth daily      DULoxetine (CYMBALTA) 60 MG

## 2020-02-16 ENCOUNTER — HOSPITAL ENCOUNTER (INPATIENT)
Age: 75
LOS: 2 days | Discharge: NURSING, REHAB OR LONG-TERM CARE | DRG: 065 | End: 2020-02-19
Attending: EMERGENCY MEDICINE | Admitting: INTERNAL MEDICINE
Payer: MEDICARE

## 2020-02-16 ENCOUNTER — APPOINTMENT (OUTPATIENT)
Dept: CT IMAGING | Age: 75
DRG: 065 | End: 2020-02-16
Payer: MEDICARE

## 2020-02-16 LAB
ALBUMIN SERPL-MCNC: 4.4 G/DL (ref 3.5–5.2)
ALP BLD-CCNC: 88 U/L (ref 40–130)
ALT SERPL-CCNC: <5 U/L (ref 5–41)
ANION GAP SERPL CALCULATED.3IONS-SCNC: 12 MMOL/L (ref 7–19)
APTT: 32 SEC (ref 26–36.2)
AST SERPL-CCNC: 13 U/L (ref 5–40)
BASOPHILS ABSOLUTE: 0.1 K/UL (ref 0–0.2)
BASOPHILS RELATIVE PERCENT: 0.8 % (ref 0–1)
BILIRUB SERPL-MCNC: 0.4 MG/DL (ref 0.2–1.2)
BUN BLDV-MCNC: 13 MG/DL (ref 8–23)
CALCIUM SERPL-MCNC: 9.4 MG/DL (ref 8.8–10.2)
CHLORIDE BLD-SCNC: 101 MMOL/L (ref 98–111)
CO2: 25 MMOL/L (ref 22–29)
CREAT SERPL-MCNC: 1 MG/DL (ref 0.5–1.2)
EOSINOPHILS ABSOLUTE: 0.1 K/UL (ref 0–0.6)
EOSINOPHILS RELATIVE PERCENT: 1.1 % (ref 0–5)
GFR NON-AFRICAN AMERICAN: >60
GLUCOSE BLD-MCNC: 91 MG/DL (ref 70–99)
GLUCOSE BLD-MCNC: 96 MG/DL (ref 74–109)
HCT VFR BLD CALC: 49.6 % (ref 42–52)
HEMOGLOBIN: 16 G/DL (ref 14–18)
IMMATURE GRANULOCYTES #: 0 K/UL
INR BLD: 1.06 (ref 0.88–1.18)
LYMPHOCYTES ABSOLUTE: 0.9 K/UL (ref 1.1–4.5)
LYMPHOCYTES RELATIVE PERCENT: 9 % (ref 20–40)
MCH RBC QN AUTO: 28.1 PG (ref 27–31)
MCHC RBC AUTO-ENTMCNC: 32.3 G/DL (ref 33–37)
MCV RBC AUTO: 87.2 FL (ref 80–94)
MONOCYTES ABSOLUTE: 0.7 K/UL (ref 0–0.9)
MONOCYTES RELATIVE PERCENT: 6.4 % (ref 0–10)
NEUTROPHILS ABSOLUTE: 8.6 K/UL (ref 1.5–7.5)
NEUTROPHILS RELATIVE PERCENT: 82.5 % (ref 50–65)
PDW BLD-RTO: 13.5 % (ref 11.5–14.5)
PERFORMED ON: NORMAL
PLATELET # BLD: 220 K/UL (ref 130–400)
PMV BLD AUTO: 10.3 FL (ref 9.4–12.4)
POTASSIUM SERPL-SCNC: 4 MMOL/L (ref 3.5–5)
PROTHROMBIN TIME: 13.2 SEC (ref 12–14.6)
RBC # BLD: 5.69 M/UL (ref 4.7–6.1)
SODIUM BLD-SCNC: 138 MMOL/L (ref 136–145)
TOTAL PROTEIN: 7.7 G/DL (ref 6.6–8.7)
WBC # BLD: 10.4 K/UL (ref 4.8–10.8)

## 2020-02-16 PROCEDURE — 80053 COMPREHEN METABOLIC PANEL: CPT

## 2020-02-16 PROCEDURE — 85610 PROTHROMBIN TIME: CPT

## 2020-02-16 PROCEDURE — 85025 COMPLETE CBC W/AUTO DIFF WBC: CPT

## 2020-02-16 PROCEDURE — 85730 THROMBOPLASTIN TIME PARTIAL: CPT

## 2020-02-16 PROCEDURE — 99285 EMERGENCY DEPT VISIT HI MDM: CPT

## 2020-02-16 PROCEDURE — 6360000004 HC RX CONTRAST MEDICATION: Performed by: EMERGENCY MEDICINE

## 2020-02-16 PROCEDURE — 36415 COLL VENOUS BLD VENIPUNCTURE: CPT

## 2020-02-16 PROCEDURE — 82948 REAGENT STRIP/BLOOD GLUCOSE: CPT

## 2020-02-16 PROCEDURE — 70498 CT ANGIOGRAPHY NECK: CPT

## 2020-02-16 PROCEDURE — 70496 CT ANGIOGRAPHY HEAD: CPT

## 2020-02-16 PROCEDURE — 70450 CT HEAD/BRAIN W/O DYE: CPT

## 2020-02-16 PROCEDURE — 93005 ELECTROCARDIOGRAM TRACING: CPT | Performed by: EMERGENCY MEDICINE

## 2020-02-16 RX ADMIN — IOPAMIDOL 90 ML: 755 INJECTION, SOLUTION INTRAVENOUS at 20:28

## 2020-02-17 ENCOUNTER — APPOINTMENT (OUTPATIENT)
Dept: MRI IMAGING | Age: 75
DRG: 065 | End: 2020-02-17
Payer: MEDICARE

## 2020-02-17 PROBLEM — I63.9 ACUTE CVA (CEREBROVASCULAR ACCIDENT) (HCC): Status: ACTIVE | Noted: 2020-02-17

## 2020-02-17 LAB
CHOLESTEROL, TOTAL: 260 MG/DL (ref 160–199)
HCT VFR BLD CALC: 45.7 % (ref 42–52)
HDLC SERPL-MCNC: 44 MG/DL (ref 55–121)
HEMOGLOBIN: 14.9 G/DL (ref 14–18)
LDL CHOLESTEROL CALCULATED: 186 MG/DL
LV EF: 60 %
LVEF MODALITY: NORMAL
MCH RBC QN AUTO: 28.1 PG (ref 27–31)
MCHC RBC AUTO-ENTMCNC: 32.6 G/DL (ref 33–37)
MCV RBC AUTO: 86.2 FL (ref 80–94)
PDW BLD-RTO: 13.5 % (ref 11.5–14.5)
PLATELET # BLD: 192 K/UL (ref 130–400)
PMV BLD AUTO: 10.5 FL (ref 9.4–12.4)
RBC # BLD: 5.3 M/UL (ref 4.7–6.1)
TRIGL SERPL-MCNC: 151 MG/DL (ref 0–149)
WBC # BLD: 7.4 K/UL (ref 4.8–10.8)

## 2020-02-17 PROCEDURE — 97116 GAIT TRAINING THERAPY: CPT

## 2020-02-17 PROCEDURE — 97162 PT EVAL MOD COMPLEX 30 MIN: CPT

## 2020-02-17 PROCEDURE — 1210000000 HC MED SURG R&B

## 2020-02-17 PROCEDURE — 93306 TTE W/DOPPLER COMPLETE: CPT

## 2020-02-17 PROCEDURE — 2580000003 HC RX 258: Performed by: HOSPITALIST

## 2020-02-17 PROCEDURE — 97535 SELF CARE MNGMENT TRAINING: CPT

## 2020-02-17 PROCEDURE — 6370000000 HC RX 637 (ALT 250 FOR IP): Performed by: EMERGENCY MEDICINE

## 2020-02-17 PROCEDURE — 92523 SPEECH SOUND LANG COMPREHEN: CPT

## 2020-02-17 PROCEDURE — 99223 1ST HOSP IP/OBS HIGH 75: CPT | Performed by: PSYCHIATRY & NEUROLOGY

## 2020-02-17 PROCEDURE — 97166 OT EVAL MOD COMPLEX 45 MIN: CPT

## 2020-02-17 PROCEDURE — 6370000000 HC RX 637 (ALT 250 FOR IP): Performed by: PSYCHIATRY & NEUROLOGY

## 2020-02-17 PROCEDURE — 70551 MRI BRAIN STEM W/O DYE: CPT

## 2020-02-17 PROCEDURE — 92610 EVALUATE SWALLOWING FUNCTION: CPT

## 2020-02-17 PROCEDURE — 6360000002 HC RX W HCPCS: Performed by: HOSPITALIST

## 2020-02-17 PROCEDURE — 36415 COLL VENOUS BLD VENIPUNCTURE: CPT

## 2020-02-17 PROCEDURE — 85027 COMPLETE CBC AUTOMATED: CPT

## 2020-02-17 PROCEDURE — 6370000000 HC RX 637 (ALT 250 FOR IP): Performed by: HOSPITALIST

## 2020-02-17 PROCEDURE — 80061 LIPID PANEL: CPT

## 2020-02-17 RX ORDER — ATORVASTATIN CALCIUM 80 MG/1
80 TABLET, FILM COATED ORAL NIGHTLY
Status: DISCONTINUED | OUTPATIENT
Start: 2020-02-17 | End: 2020-02-19 | Stop reason: HOSPADM

## 2020-02-17 RX ORDER — ACETAMINOPHEN 325 MG/1
650 TABLET ORAL EVERY 4 HOURS PRN
Status: DISCONTINUED | OUTPATIENT
Start: 2020-02-17 | End: 2020-02-19 | Stop reason: HOSPADM

## 2020-02-17 RX ORDER — SODIUM CHLORIDE 0.9 % (FLUSH) 0.9 %
10 SYRINGE (ML) INJECTION PRN
Status: DISCONTINUED | OUTPATIENT
Start: 2020-02-17 | End: 2020-02-19 | Stop reason: HOSPADM

## 2020-02-17 RX ORDER — SODIUM CHLORIDE 9 MG/ML
INJECTION, SOLUTION INTRAVENOUS CONTINUOUS
Status: DISCONTINUED | OUTPATIENT
Start: 2020-02-17 | End: 2020-02-19 | Stop reason: HOSPADM

## 2020-02-17 RX ORDER — ASPIRIN 81 MG/1
81 TABLET ORAL DAILY
Status: DISCONTINUED | OUTPATIENT
Start: 2020-02-17 | End: 2020-02-19 | Stop reason: HOSPADM

## 2020-02-17 RX ORDER — CLOPIDOGREL BISULFATE 75 MG/1
75 TABLET ORAL DAILY
Status: DISCONTINUED | OUTPATIENT
Start: 2020-02-17 | End: 2020-02-19 | Stop reason: HOSPADM

## 2020-02-17 RX ORDER — ASPIRIN 325 MG
325 TABLET ORAL ONCE
Status: COMPLETED | OUTPATIENT
Start: 2020-02-17 | End: 2020-02-17

## 2020-02-17 RX ORDER — LABETALOL 20 MG/4 ML (5 MG/ML) INTRAVENOUS SYRINGE
10 EVERY 10 MIN PRN
Status: DISCONTINUED | OUTPATIENT
Start: 2020-02-17 | End: 2020-02-19 | Stop reason: HOSPADM

## 2020-02-17 RX ORDER — SODIUM CHLORIDE 0.9 % (FLUSH) 0.9 %
10 SYRINGE (ML) INJECTION EVERY 12 HOURS SCHEDULED
Status: DISCONTINUED | OUTPATIENT
Start: 2020-02-17 | End: 2020-02-19 | Stop reason: HOSPADM

## 2020-02-17 RX ORDER — ONDANSETRON 2 MG/ML
4 INJECTION INTRAMUSCULAR; INTRAVENOUS EVERY 6 HOURS PRN
Status: DISCONTINUED | OUTPATIENT
Start: 2020-02-17 | End: 2020-02-19 | Stop reason: HOSPADM

## 2020-02-17 RX ORDER — CARBIDOPA AND LEVODOPA 50; 200 MG/1; MG/1
3 TABLET, EXTENDED RELEASE ORAL DAILY
Status: DISCONTINUED | OUTPATIENT
Start: 2020-02-17 | End: 2020-02-18

## 2020-02-17 RX ORDER — ACETAMINOPHEN 650 MG/1
650 SUPPOSITORY RECTAL EVERY 4 HOURS PRN
Status: DISCONTINUED | OUTPATIENT
Start: 2020-02-17 | End: 2020-02-19 | Stop reason: HOSPADM

## 2020-02-17 RX ORDER — ASPIRIN 300 MG/1
300 SUPPOSITORY RECTAL DAILY
Status: DISCONTINUED | OUTPATIENT
Start: 2020-02-17 | End: 2020-02-19 | Stop reason: HOSPADM

## 2020-02-17 RX ADMIN — ASPIRIN 325 MG: 325 TABLET, FILM COATED ORAL at 00:10

## 2020-02-17 RX ADMIN — SODIUM CHLORIDE: 9 INJECTION, SOLUTION INTRAVENOUS at 00:58

## 2020-02-17 RX ADMIN — ENOXAPARIN SODIUM 40 MG: 40 INJECTION SUBCUTANEOUS at 08:19

## 2020-02-17 RX ADMIN — CARBIDOPA AND LEVODOPA 3 TABLET: 50; 200 TABLET, EXTENDED RELEASE ORAL at 08:18

## 2020-02-17 RX ADMIN — ATORVASTATIN CALCIUM 80 MG: 80 TABLET, FILM COATED ORAL at 21:29

## 2020-02-17 RX ADMIN — CLOPIDOGREL BISULFATE 75 MG: 75 TABLET ORAL at 17:38

## 2020-02-17 RX ADMIN — ATORVASTATIN CALCIUM 80 MG: 80 TABLET, FILM COATED ORAL at 01:21

## 2020-02-17 RX ADMIN — SODIUM CHLORIDE, PRESERVATIVE FREE 10 ML: 5 INJECTION INTRAVENOUS at 21:29

## 2020-02-17 RX ADMIN — ASPIRIN 81 MG: 81 TABLET, COATED ORAL at 08:18

## 2020-02-17 ASSESSMENT — ENCOUNTER SYMPTOMS
NAUSEA: 0
ABDOMINAL DISTENTION: 0
ABDOMINAL PAIN: 0
VOMITING: 0
SHORTNESS OF BREATH: 0
DIARRHEA: 0

## 2020-02-17 NOTE — PROGRESS NOTES
Physical Therapy    Facility/Department: [facility] SURG SERVICES  Initial Assessment    NAME: Camacho Medeiros  : 1945  MRN: [id number]    Date of Service: 2020    Discharge Recommendations:  Continue to assess pending progress, Patient would benefit from continued therapy after discharge   PT Equipment Recommendations  Other: ASSESSING    Assessment   Body structures, Functions, Activity limitations: Decreased functional mobility ; Decreased strength;Decreased balance;Decreased sensation;Decreased coordination;Decreased endurance  Assessment: pt WORKING WELL WITH PT/OT TO BEGIN MOBILITY TRAINING POST CVA. pt WOULD BE A GOOD CANDIDIATE FOR ADDITIONAL REHAB SERVICES UPON D/C FROM HOSPITAL  Prognosis: Good  Decision Making: Medium Complexity  PT Education: General Safety;Gait Training;Functional Mobility Training  REQUIRES PT FOLLOW UP: Yes  Activity Tolerance  Activity Tolerance: Patient Tolerated treatment well       Patient Diagnosis(es): The encounter diagnosis was Cerebrovascular accident (CVA), unspecified mechanism ([facility] [address] has a past medical history of Cancer ([facility] [address] Neuropathy, Parkinson disease ([facility] [address] BARBARA (stress urinary incontinence), male, and Vitamin B12 deficiency. has a past surgical history that includes Prostatectomy; Neck surgery; back surgery; and Mohs surgery.     Restrictions     Vision/Hearing  Vision: Impaired  Vision Exceptions: Wears glasses at all times  Hearing: Exceptions to [facility]  Hearing Exceptions: Hard of hearing/hearing concerns;Bilateral hearing aid     Subjective  General  Additional Pertinent Hx: PARKINSON'S, NECK/BACK SX  Diagnosis: CVA, L WEAKNESS  Follows Commands: Within Functional Limits  Subjective  Subjective: pt STATES HE IS READY TO WORK WITH PT/OT AND REPORTS HE WAS AMB IND WITHOUT AD PRIOR TO THIS EVENT BUT THEN BEGAN USING A RW DUE TO LOB, UNSTEADINESS  Pain Screening  Patient Currently in Pain: No          Orientation     Social/Functional History  Social/Functional History  Home Access: Stairs to enter with rails  Bathroom Shower/Tub: Tub/Shower unit  Bathroom Equipment: Shower chair  Home Equipment: Rolling walker  ADL Assistance: Independent  Ambulation Assistance: Independent  Transfer Assistance: Independent  Cognition        Objective          AROM RLE (degrees)  RLE AROM: WFL  AROM LLE (degrees)  LLE AROM : WFL  Strength RLE  Comment: GROSSLY 3 TO 3+/5  Strength LLE  Comment: GROSSLY -3 TO 3/5  Tone LLE  LLE Tone: Hypertonic  Motor Control  Gross Motor?: Exceptions  Comments: DECREASED CONTROL NOTED IN L LE  Coordination  Heel to Shin: Abnormal  Sensation  Overall Sensation Status: Impaired(NUMB IN BILAT FEET (CHRONIC DUE TO HX OF BACK INJURY AND SX BUT STATES IT IS WORSE THAN BASELINE))  Bed mobility  Rolling to Left: Stand by assistance  Rolling to Right: Stand by assistance  Supine to Sit: Contact guard assistance  Comment: SUP>SIT SLOW AND LABORED  Transfers  Sit to Stand: Minimal Assistance;Contact guard assistance  Stand to sit: Contact guard assistance;Minimal Assistance  Bed to Chair: Minimal assistance  Comment: Pt REQUIRED ASSIST TO GET INTO PROPER POSITIONING BEFORE SIT TO STAND( SCOOTING FW, COG OVER DRAKE)   Ambulation  Ambulation?: Yes  Ambulation 1  Surface: level tile  Device: Rolling Walker  Other Apparatus: (GT BELT)  Assistance: Minimal assistance  Quality of Gait: SLOW, DEC COORDINATION WHEN TAKING STEPS WITH L LE  Gait Deviations: Decreased step length;Decreased step height  Distance: 3 FT     Balance  Comments: LEANS TO THE RIGHT IN SITTING AND STANDING BUT WITH TACTILE AND VC'S, pt WAS ABLE TO CORRECT AND MAINTAIN FOR SEV SECONDS        Plan   Plan  Times per week: 5-7  Plan weeks: 2  Current Treatment Recommendations: Strengthening, Gait Training, Patient/Caregiver Education & Training, Transfer Training, Safety Education & Training, Functional Mobility Training, Balance Training, Neuromuscular Re-education, Endurance Training  Plan Comment: DOUBLE

## 2020-02-17 NOTE — CONSULTS
BMI 22.78 kg/m²   Constitutional - well developed, well nourished. Eyes - conjunctiva normal. Pupils react to light  Ear, nose, throat -hearing intact to finger rub No scars, masses, or lesions over external nose or ears, no atrophy of tongue  Neck-symmetric, no masses noted, no jugular vein distension  Respiration- chest wall appears symmetric, good expansion,   normal effort without use of accessory muscles  Musculoskeletal - no significant wasting of muscles noted, no bony deformities  Extremities-no clubbing, cyanosis or edema  Skin - warm, dry, and intact. No rash, erythema, or pallor. Psychiatric - mood, affect, and behavior appear normal.   Neurological exam  Awake, alert, fluent. Patient showing some confusion today. Last night said he was at [facility] and today says he is in a school house. He recognizes me and is able to follow simple commands but not complex ones. Attention and concentration appear appropriate    Speech normal without dysarthria  No clear issues with language of fund of knowledge  Cranial Nerve Exam   CN II- Visual fields grossly unremarkable  CN III, IV,VI-EOMI, No nystagmus, conjugate eye movements, no ptosis  CN VII-no facial assymetry  CN VIII-Hearing intact to voice  CN IX and X- Palate elevates in midline  CN XI-good shoulder shrug  CN XII-Tongue midline with no fasciculations or fibrillations  Motor Exam  V/V throughout upper and lower extremities bilaterally, mild cogwheeling, normal tone  Sensory Exam  Sensation intact to light touch and temperature upper and lower extremities bilaterally  Reflexes   Trace Throughout  No clonus ankles  No Shell's sign bilateral hands  Tremors- no tremors in hands or head noted  Gait  Not tested  Coordination  Finger to nose-unremarkable  ?   ?  CBC:   Recent Labs     02/16/20 1925 02/17/20  0354   WBC 10.4 7.4   HGB 16.0 14.9    192     BMP:   Recent Labs     02/16/20 1925      K 4.0      CO2 25   BUN 13 CREATININE 1.0   GLUCOSE 96     Hepatic:   Recent Labs     02/16/20 1925   AST 13   ALT <5*   BILITOT 0.4   ALKPHOS 88     Lipids:   Recent Labs     02/17/20  0354   CHOL 260*   HDL 44*     INR:   Recent Labs     02/16/20 1925   INR 1.06     ?  ? Assessment and Plan   1. Probable right hemispheric stroke. Continue aspirin. CTA unremarkable. For MRI and echo today. PT/OT/SLP  2. Parkinson's disease-continue current medications at home. 3.  Dizziness. Monitor blood pressure while here.   ?  ?      Leonel Conrad MD  Board Certified in Neurology

## 2020-02-17 NOTE — PROGRESS NOTES
Speech Language Pathology  Facility/Department: [facility] SURG SERVICES  Initial Speech/Language/Cognitive Assessment  Bedside Swallow Evaluation     NAME: Adelso Fitzpatrick  : 1945   MRN: [id number]  ADMISSION DATE: 2020  ADMITTING DIAGNOSIS: has Neuropathy; Benign meningioma ([facility] [address] Memory difficulties; Non-smoker; Normal body mass index (BMI); OAB (overactive bladder); Primary Parkinsonism ([facility] [address] Prostate cancer ([facility] [address] BARBARA (stress urinary incontinence), male; and Acute CVA (cerebrovascular accident) [facility]) on their problem list.    Date of Eval: 2020   Evaluating Therapist: WALESKA Albrecht    Assessment:  Completed assessment. Patient exhibited slow speech rate with slow lingual movements and low volume of speech noted. SLP still ranked functional intelligibility for unfamiliar listeners at 100% in conversation. Patient also exhibited slow processing, delayed auditory comprehension, delayed verbal responses with mild-moderate fragmentations observed where the patient started expressions but then discontinued, and decreased high level reasoning/problem solving. Also evaluated patient's swallowing function. Patient exhibits slow oral prep of more solid consistencies as well as sluggish, inconsistently mildly decreased laryngeal elevation for swallow airway protection. Even so, no outward S/S penetration/aspiration was noted with any regular solid consistency trial or thin H2O trial administered during assessment this date. At this time, recommend continuation regular solid consistency and thin liquids. Do recommend meds whole in pudding/applesauce. Assist feed (patient appeared slow and unsteady in brining food/drink to self). Will continue to follow to monitor swallow and cognitive-linguistic functioning. Thank you for this consult.     Recommendations:  Requires SLP Intervention: Yes    Subjective:  General  Chart Reviewed: Yes  Patient assessed for rehabilitation services?: Yes  Family / demonstrated ability to verbalize appropriate simple 1 step solutions to situations that could occur during activities of daily living at independent level. Patient was unable to demonstrate increased thought organization and verbalize multi-step solutions or provide multiple solutions to situations that could occur independently.)    Additional Assessments:  Assessed patient's swallowing function. Patient exhibited slow oral prep of regular solid consistency trials presented independently. Oral transit of regular solid consistency primarily measured 1-2 seconds in length and min oral cavity residue was noted post swallows; residue cleared from the mouth with additional dry swallows. Oral transit of thin H2O trials, presented via cup by SLP, primarily measured 1-2 seconds in length. Laryngeal elevation during swallow initiation was considered to be sluggish and inconsistently mildly decreased for swallow airway protection. Even so, no outward S/S penetration/aspiration was noted with any regular solid consistency trial or thin H2O trial administered during assessment this date. At this time, recommend continuation regular solid consistency and thin liquids. Do recommend meds whole in pudding/applesauce. Assist feed. Will continue to follow to monitor swallow and cognitive-linguistic functioning.      Electronically signed by WALESAK Machado on 2/17/2020 at 10:01 AM

## 2020-02-17 NOTE — PROGRESS NOTES
Patient arrived from ER, patient alert and orientated x 4. Patients family present at bedside. Weakness noted to left upper extremity. Patient has a history of parkinson's disease and sees Dr. Jorge Bell. Patient has no complaints of pain. Patient hypertensive, will continue to monitor.  Electronically signed by Vidya Dupree RN on 2/17/2020 at 12:56 AM

## 2020-02-17 NOTE — PROGRESS NOTES
30 mL Oral Daily PRN Janna Rucker MD        [facility]etron [facility]) injection 4 mg  4 mg Intravenous Q6H PRN Janna Rucker MD        enoxaparin (LOVENOX) injection 40 mg  40 mg Subcutaneous Daily Janna Rucker MD   40 mg at 02/17/20 0819    aspirin EC tablet 81 mg  81 mg Oral Daily Janna Rucker MD   81 mg at 02/17/20 7199    Or    aspirin suppository 300 mg  300 mg Rectal Daily Janna Rucker MD        0.9 % sodium chloride infusion   Intravenous Continuous Janna Rucker MD 75 mL/hr at 02/17/20 0058      acetaminophen (TYLENOL) tablet 650 mg  650 mg Oral Q4H PRN Janna Rucker MD        Or   Mihaela Vidal acetaminophen (TYLENOL) suppository 650 mg  650 mg Rectal Q4H PRN Janna Rucker MD        atorvastatin (LIPITOR) tablet 80 mg  80 mg Oral Nightly Janna Rucker MD   80 mg at 02/17/20 0121    labetalol (NORMODYNE;TRANDATE) injection syringe 10 mg  10 mg Intravenous Q10 Min PRN Janna Rucker MD             sodium chloride 75 mL/hr at 02/17/20 0058      carbidopa-levodopa  3 tablet Oral Daily    sodium chloride flush  10 mL Intravenous 2 times per day    enoxaparin  40 mg Subcutaneous Daily    aspirin  81 mg Oral Daily    Or    aspirin  300 mg Rectal Daily    atorvastatin  80 mg Oral Nightly     sodium chloride flush, magnesium hydroxide, ondansetron, acetaminophen **OR** acetaminophen, labetalol  DIET GENERAL;       Labs:   CBC with DIFF:  Recent Labs     02/16/20  1925 02/17/20  0354   WBC 10.4 7.4   RBC 5.69 5.30   HGB 16.0 14.9   HCT 49.6 45.7   MCV 87.2 86.2   MCH 28.1 28.1   MCHC 32.3* 32.6*   RDW 13.5 13.5    192   MPV 10.3 10.5   NEUTOPHILPCT 82.5*  --    LYMPHOPCT 9.0*  --    MONOPCT 6.4  --    EOSRELPCT 1.1  --    BASOPCT 0.8  --    NEUTROABS 8.6*  --    LYMPHSABS 0.9*  --    MONOSABS 0.70  --    EOSABS 0.10  --    BASOSABS 0.10  --        CMP/BMP:  Recent Labs     02/16/20 1925      K 4.0      CO2 25   ANIONGAP 12   GLUCOSE 96   BUN 13   CREATININE 1.0   LABGLOM >60   CALCIUM atherosclerotic calcification near each carotid terminus and there is 50% narrowing at the right ICA terminus. No intraluminal thrombus. Patent and normally symmetric middle cerebral arteries. Normal and symmetric anterior cerebral arteries. Normal and symmetric posterior cerebral arteries. The dural venous sinuses are patent. Normal variant asymmetry with a diminutive left transverse sinus. 1. Atherosclerotic calcification with approximately 50% narrowing of the distal right ICA. The [place] is intact and there is no intraluminal thrombus. Signed by Dr Dagoberto Simmons on 2/16/2020 8:48 PM    Ct Head Wo Contrast    Result Date: 2/16/2020  CT HEAD WO CONTRAST 2/16/2020 8:20 PM History: Stroke symptoms. Left leg weakness. In order to have a CT radiation dose as low as reasonably achievable Automated Exposure Control was utilized for adjustment of the mA and/or KV according to patient size. DLP in mGycm= 856. Noncontrast head CT with no comparison. Mild cortical atrophy. Moderate small vessel disease. Normal ventricle size. No hemorrhage or mass effect. No cortical infarct is seen. Clear paranasal sinuses. 1. Age-related changes with atrophy and small vessel disease. 2. No acute intracranial abnormality is seen. Signed by Dr Dagoberto Simmons on 2/16/2020 8:21 PM    Cta Neck W Contrast    Result Date: 2/16/2020  CTA NECK W CONTRAST 2/16/2020 8:42 PM History: Left leg weakness. Stroke symptoms. In order to have a CT radiation dose as low as reasonably achievable Automated Exposure Control was utilized for adjustment of the mA and/or KV according to patient size. DLP in mGycm= 736. CT angiography protocol. CT imaging with bolus IV contrast injection. Under concurrent supervision axial, sagittal, coronal, and three-dimensional data sets were constructed. Normally patent aortic arch. Patent great vessel origins. Tortuous proximal left subclavian and left common carotid artery.  Normally patent common

## 2020-02-17 NOTE — PROGRESS NOTES
4 Eyes Skin Assessment    Mort Dot is being assessed upon: Admission    I agree that I, Yesenia Carmen, along with Alysha Porter (either 2 RN's or 1 LPN and 1 RN) have performed a thorough Head to Toe Skin Assessment on the patient. ALL assessment sites listed below have been assessed. Areas assessed by both nurses:     [x]   Head, Face, and Ears   [x]   Shoulders, Back, and Chest  [x]   Arms, Elbows, and Hands   [x]   Coccyx, Sacrum, and Ischium  [x]   Legs, Feet, and Heels    Does the Patient have Skin Breakdown?  No    Riky Prevention initiated: NA  Wound Care Orders initiated: NA    [facility] nurse consulted for Pressure Injury (Stage 3,4, Unstageable, DTI, NWPT, and Complex wounds) and New or Established Ostomies: No        Primary Nurse eSignature: Yesenia Carmen RN on 2/17/2020 at 1:45 AM      Co-Signer eSignature: Electronically signed by John Davila RN on 2/17/2020 at 4:24 AM

## 2020-02-17 NOTE — PROGRESS NOTES
Bedside swallow study performed at this time. Patient consumed saltine crackers and thin water with no difficulty. Patient will be ordered a general diet at this time.  Electronically signed by Samantha Andrews RN on 2/17/2020 at 1:44 AM

## 2020-02-17 NOTE — ED PROVIDER NOTES
Social connections:     Talks on phone: None     Gets together: None     Attends [employer]ology service: None     Active member of club or organization: None     Attends meetings of clubs or organizations: None     Relationship status: None    Intimate partner violence:     Fear of current or ex partner: None     Emotionally abused: None     Physically abused: None     Forced sexual activity: None   Other Topics Concern    None   Social History Narrative    None       SCREENINGS   NIH Stroke Scale  Level of Consciousness (1a. ): Alert  LOC Questions (1b. ): Answers both correctly  LOC Commands (1c. ): Performs both tasks correctly  Best Gaze (2. ): Normal  Visual (3. ): No visual loss  Facial Palsy (4. ): Normal symmetrical movement  Motor Arm, Left (5a. ): Drift, but does not hit bed  Motor Arm, Right (5b. ): No drift  Motor Leg, Left (6a. ): Drift, but does not hit bed  Motor Leg, Right (6b. ): No drift  Limb Ataxia (7. ): (!) Present in one limb  Sensory (8. ): Normal  Best Language (9. ): No aphasia  Dysarthria (10. ): Normal  Extinction and Inattention (11): No abnormality  Total: 3         PHYSICAL EXAM    (up to 7 for level 4, 8 or more for level 5)     ED Triage Vitals [02/16/20 1924]   BP Temp Temp Source Pulse Resp SpO2 Height Weight   (!) 180/115 98 °F (36.7 °C) Oral 81 19 99 % 6' (1.829 m) 174 lb (78.9 kg)       Physical Exam  Vitals signs and nursing note reviewed. HENT:      Head: Atraumatic. Mouth/Throat:      Mouth: Mucous membranes are not dry. Pharynx: No posterior oropharyngeal erythema. Eyes:      General: No scleral icterus. Pupils: Pupils are equal, round, and reactive to light. Neck:      Trachea: No tracheal deviation. Cardiovascular:      Rate and Rhythm: Normal rate and regular rhythm. Heart sounds: Normal heart sounds. No murmur. Pulmonary:      Effort: Pulmonary effort is normal. No respiratory distress. Breath sounds: Normal breath sounds. No stridor. Abdominal:      General: There is no distension. Palpations: Abdomen is soft. Tenderness: There is no abdominal tenderness. There is no guarding. Skin:     Coloration: Skin is not pale. Findings: No rash. Neurological:      Comments: Mental Status Exam:   Alert and oriented times three, follows commands, speech intact. Visual Fields Intact    Cranial Snpacm-YI-LEC     Cranial nerve II  Visual acuity: normal  Cranial nerve III  Pupils: equal, round, reactive to light  Cranial nerves III, IV, VI  Extraocular Movements: intact  Cranial nerve V  Facial sensation: intact  Cranial nerve VII  Facial strength: intact  Cranial nerve VIII  Hearing: intact  Cranial nerve IX  Palate: intact  Cranial nerve XI  Shoulder shrug: intact  Cranial nerve XII  Tongue movement: normal    Motor:   Pronator Drift: absent  Motor exam is symmetrical 5 out of 5 in Right UE and Right LE bilaterally. Left UE is 4/5 and Left   Tone: normal      Sensory:  Light Touch  Right Upper Extremity: normal  Left Upper Extremity: normal  Right Lower Extremity: normal  Left Lower Extremity: normal       Psychiatric:         Behavior: Behavior is cooperative. DIAGNOSTIC RESULTS     EKG: All EKG's areinterpreted by the Emergency Department Physician who either signs or Co-signs this chart in the absence of a cardiologist.    4085: NSR @ 77, no ST changes    RADIOLOGY:  Non-plain film images such as CT, Ultrasound and MRI are read by the radiologist. Plain radiographic images are visualized and preliminarily interpreted bythe emergency physician with the below findings:      CTA Head W WO Contrast   Final Result   1. Atherosclerotic calcification with approximately 50% narrowing of   the distal right ICA. The [place] of [place] is intact and there is no   intraluminal thrombus. Signed by Dr Yadi Amaya on 2/16/2020 8:48 PM      CTA Neck W Contrast   Final Result   1.  Mild atherosclerotic plaque at the carotid DISPOSITION/PLAN   DISPOSITION Decision To Admit 02/16/2020 11:53:24 PM    (Please note that portions of this note were completed with a voice recognition program.  Efforts were made to edit thedictations but occasionally words are mis-transcribed.)    Laura Schmitt MD (electronically signed)  Attending Emergency Physician         Laura Schmitt MD  02/17/20 0408

## 2020-02-17 NOTE — ED NOTES
Pt resting quietly in room at this time. A/o x3, respirations equal and nonlabored, skin PWD.   Awaiting physician for final evaluation of results and to follow up with pt.     Yudi Payne RN  02/16/20 6971

## 2020-02-17 NOTE — PROGRESS NOTES
Assistance: Independent  Transfer Assistance: Independent       Objective        Orientation  Overall Orientation Status: Within Normal Limits     Balance  Standing Balance: Minimal assistance  Toilet Transfers  Toilet - Technique: Stand step  Equipment Used: Standard bedside commode  Toilet Transfer: Minimal assistance  Shower transfer: Min A  ADL  Feeding: Supervision  Grooming: Supervision  UE Bathing: Minimal assistance  LE Bathing: Moderate assistance  UE Dressing: Minimal assistance  LE Dressing: Moderate assistance  Toileting: Minimal assistance; Moderate assistance  Adaptive Equipment:  Reacher, sock aid, shoe horn, long sponge  Coordination  Movements Are Fluid And Coordinated: No(Mild ataxia left extremities)     Bed mobility  Rolling to Left: Stand by assistance  Rolling to Right: Stand by assistance  Supine to Sit: Contact guard assistance  Transfers  Stand Step Transfers: Minimal assistance  Transfer Comments: needs cues for sit to stand techniques     Cognition  Overall Cognitive Status: WFL        Sensation  Overall Sensation Status: Impaired(NUMB IN BILAT FEET (CHRONIC DUE TO HX OF BACK INJURY AND SX BUT STATES IT IS WORSE THAN BASELINE))        LUE PROM (degrees)  LUE PROM: WFL  LUE AROM (degrees)  LUE AROM : WFL  RUE PROM (degrees)  RUE PROM: WFL  RUE AROM (degrees)  RUE AROM : WFL   Strength grossly 4-/5                 Tx initiated:  Balance activities, sit to stand techniques (15 mins)      Plan   Plan  Times per week: 3-5x weekly    G-Code     OutComes Score                                                  AM-PAC Score             Goals  Short term goals  Time Frame for Short term goals: 1-2 weeks  Short term goal 1: The patient will demo ability to perform transfers with CGA  Short term goal 2: Perform standing aspects of dressing and toileting with CGA  Short term goal 3: Perform seated aspects of dressing with AE with CGA  Short term goal 4:  Independent with bedside therapeutic activity

## 2020-02-17 NOTE — H&P
[facility]      Hospitalist - History & Physical      PCP: Yadira Beltran MD    Date of Admission: 2/16/2020    Date of Service: 2/17/2020    Chief Complaint:  Left side weakness , ataxic gait falling towards the left side . Aphasia earlier today but has resolved     History Of Present Illness: The patient is a 75 y.o. male with PMH of parkinson, HTN, depression  who presents to the emergency department for evaluation of unsteadiness of gait, left leg weakness. Patient's family reports that yesterday throughout the day he was doing just fine. He was able to ambulate at his normal baseline ability without the assistance of a walker or cane. States that sometime in the night he woke up and was very unsteady with his gait and that is persisted throughout the day on Sunday   Family stated they also noticed some occasional changes in speech earlier this morning, but this has resolved  , no h/o CVA and not on ASA , in ER CTA head nech , CT head -ve, BP was 170, not candidate for TPA since onset was since 24 hrs ago     Past Medical History:        Diagnosis Date    Cancer [facility])     prostate    Neuropathy     [facility] disease ([facility] [address]     BARBARA (stress urinary incontinence), male 10/10/2016    Vitamin B12 deficiency        Past Surgical History:        Procedure Laterality Date    BACK SURGERY      MOHS SURGERY      NECK SURGERY      PROSTATECTOMY         Home Medications:  Prior to Admission medications    Medication Sig Start Date End Date Taking?  Authorizing Provider   cyanocobalamin ([employer] VITAMIN B12) 1000 MCG tablet Take 1,000 mcg by mouth daily 12/13/19  Yes Historical Provider, MD   DULoxetine (CYMBALTA) 60 MG extended release capsule Take 1 capsule by mouth daily 2/7/20  Yes Karen Carbajal MD   lisinopril (PRINIVIL;ZESTRIL) 2.5 MG tablet Take 1 tablet by mouth daily 5/22/19  Yes Historical Provider, MD   carbidopa-levodopa (SINEMET CR)  MG per extended release tablet Take 3 tablets by mouth daily 8/5/19  Yes Florinda Veliz MD   acetaminophen (TYLENOL) 325 MG tablet Take 650 mg by mouth as needed for Pain   Yes Historical Provider, MD   carvedilol (COREG) 6.25 MG tablet Take 6.25 mg by mouth nightly 9/25/18  Yes Historical Provider, MD       Allergies:    Finasteride; Other; and Sulfa antibiotics    Social History:      Tobacco:   reports that he has never smoked. He has never used smokeless tobacco.  Alcohol:   reports no history of alcohol use. Illicit Drugs: no     Family History:  History reviewed. No pertinent family history. Review of Systems:   Constitutional / general:  No fever / chills / sweats  HEENT: No sore throat / hoarseness / vision changes  CV:  No chest pain / palpitations/ orthopnea   Respiratory:  No cough / shortness of breath / sputum / hemoptysis  GI: No nausea / vomiting / abdominal pain / diarrhea / constipation  :  No dysuria / hesitancy / urgency / hematuria   Musculoskeletal:  No edema / cyanosis / pain  Psychiatric:  No depression / anxiety / insomnia  Skin:  No new rashes / lesions    Physical Examination:        BP (!) 178/112 Comment: Manual  Pulse 81   Temp 99.6 °F (37.6 °C) (Oral)   Resp 21   Ht 6' (1.829 m)   Wt 174 lb (78.9 kg)   SpO2 100%   BMI 23.60 kg/m²   General appearance: No apparent distress, appears stated age and cooperative. Well developed and well groomed. HEENT: Normal cephalic, atraumatic without obvious deformity. Pupils equal, round, and reactive to light. Extra ocular muscles intact. Conjunctivae/corneas clear. Normal ears and nose. .  Neck: Supple, with full range of motion. No jugular venous distention. Trachea midline. Thyroid no masses noted. Respiratory: Normal respiratory effort. Clear to auscultation bilaterally, without Rales/Wheezes/Rhonchi. Cardiovascular: Regular rate and rhythm with normal S1/S2 without murmurs, rubs or gallops. Abdomen: Soft, non-tender, non-distended with normal bowel sounds.   Musculoskeletal: No prophylaxis with Lovenox           [facility]  2/17/2020  12:02 AM

## 2020-02-18 ENCOUNTER — APPOINTMENT (OUTPATIENT)
Dept: GENERAL RADIOLOGY | Age: 75
DRG: 065 | End: 2020-02-18
Payer: MEDICARE

## 2020-02-18 ENCOUNTER — APPOINTMENT (OUTPATIENT)
Dept: CT IMAGING | Age: 75
DRG: 065 | End: 2020-02-18
Payer: MEDICARE

## 2020-02-18 LAB
EKG P AXIS: 36 DEGREES
EKG P-R INTERVAL: 196 MS
EKG Q-T INTERVAL: 356 MS
EKG QRS DURATION: 82 MS
EKG QTC CALCULATION (BAZETT): 385 MS
EKG T AXIS: 45 DEGREES

## 2020-02-18 PROCEDURE — 6370000000 HC RX 637 (ALT 250 FOR IP): Performed by: HOSPITALIST

## 2020-02-18 PROCEDURE — 70450 CT HEAD/BRAIN W/O DYE: CPT

## 2020-02-18 PROCEDURE — 92526 ORAL FUNCTION THERAPY: CPT

## 2020-02-18 PROCEDURE — 93010 ELECTROCARDIOGRAM REPORT: CPT | Performed by: INTERNAL MEDICINE

## 2020-02-18 PROCEDURE — 6370000000 HC RX 637 (ALT 250 FOR IP): Performed by: PSYCHIATRY & NEUROLOGY

## 2020-02-18 PROCEDURE — 1210000000 HC MED SURG R&B

## 2020-02-18 PROCEDURE — 97116 GAIT TRAINING THERAPY: CPT

## 2020-02-18 PROCEDURE — 97110 THERAPEUTIC EXERCISES: CPT

## 2020-02-18 PROCEDURE — 2580000003 HC RX 258: Performed by: HOSPITALIST

## 2020-02-18 PROCEDURE — 6370000000 HC RX 637 (ALT 250 FOR IP): Performed by: INTERNAL MEDICINE

## 2020-02-18 PROCEDURE — 6360000002 HC RX W HCPCS: Performed by: HOSPITALIST

## 2020-02-18 PROCEDURE — 73080 X-RAY EXAM OF ELBOW: CPT

## 2020-02-18 PROCEDURE — 97530 THERAPEUTIC ACTIVITIES: CPT

## 2020-02-18 PROCEDURE — 99233 SBSQ HOSP IP/OBS HIGH 50: CPT | Performed by: PSYCHIATRY & NEUROLOGY

## 2020-02-18 RX ORDER — SODIUM CHLORIDE 0.9 % (FLUSH) 0.9 %
10 SYRINGE (ML) INJECTION EVERY 12 HOURS SCHEDULED
Status: CANCELLED | OUTPATIENT
Start: 2020-02-18

## 2020-02-18 RX ORDER — CLOPIDOGREL BISULFATE 75 MG/1
75 TABLET ORAL DAILY
Status: CANCELLED | OUTPATIENT
Start: 2020-02-19

## 2020-02-18 RX ORDER — ASPIRIN 300 MG/1
300 SUPPOSITORY RECTAL DAILY
Status: CANCELLED | OUTPATIENT
Start: 2020-02-19

## 2020-02-18 RX ORDER — LABETALOL 20 MG/4 ML (5 MG/ML) INTRAVENOUS SYRINGE
10 EVERY 10 MIN PRN
Status: CANCELLED | OUTPATIENT
Start: 2020-02-18

## 2020-02-18 RX ORDER — ONDANSETRON 2 MG/ML
4 INJECTION INTRAMUSCULAR; INTRAVENOUS EVERY 6 HOURS PRN
Status: CANCELLED | OUTPATIENT
Start: 2020-02-18

## 2020-02-18 RX ORDER — ACETAMINOPHEN 650 MG/1
650 SUPPOSITORY RECTAL EVERY 4 HOURS PRN
Status: CANCELLED | OUTPATIENT
Start: 2020-02-18

## 2020-02-18 RX ORDER — CARBIDOPA AND LEVODOPA 50; 200 MG/1; MG/1
1 TABLET, EXTENDED RELEASE ORAL 3 TIMES DAILY
Status: CANCELLED | OUTPATIENT
Start: 2020-02-18

## 2020-02-18 RX ORDER — ATORVASTATIN CALCIUM 80 MG/1
80 TABLET, FILM COATED ORAL NIGHTLY
Status: CANCELLED | OUTPATIENT
Start: 2020-02-18

## 2020-02-18 RX ORDER — CARBIDOPA AND LEVODOPA 50; 200 MG/1; MG/1
1 TABLET, EXTENDED RELEASE ORAL 3 TIMES DAILY
Status: DISCONTINUED | OUTPATIENT
Start: 2020-02-18 | End: 2020-02-19 | Stop reason: HOSPADM

## 2020-02-18 RX ORDER — SODIUM CHLORIDE 9 MG/ML
INJECTION, SOLUTION INTRAVENOUS CONTINUOUS
Status: DISCONTINUED | OUTPATIENT
Start: 2020-02-19 | End: 2020-02-19 | Stop reason: HOSPADM

## 2020-02-18 RX ORDER — CLONIDINE HYDROCHLORIDE 0.1 MG/1
0.1 TABLET ORAL EVERY 4 HOURS PRN
Status: DISCONTINUED | OUTPATIENT
Start: 2020-02-18 | End: 2020-02-19 | Stop reason: HOSPADM

## 2020-02-18 RX ORDER — SODIUM CHLORIDE 0.9 % (FLUSH) 0.9 %
10 SYRINGE (ML) INJECTION PRN
Status: CANCELLED | OUTPATIENT
Start: 2020-02-18

## 2020-02-18 RX ORDER — SODIUM CHLORIDE 9 MG/ML
INJECTION, SOLUTION INTRAVENOUS CONTINUOUS
Status: CANCELLED | OUTPATIENT
Start: 2020-02-18

## 2020-02-18 RX ORDER — ASPIRIN 81 MG/1
81 TABLET ORAL DAILY
Status: CANCELLED | OUTPATIENT
Start: 2020-02-19

## 2020-02-18 RX ORDER — ACETAMINOPHEN 325 MG/1
650 TABLET ORAL EVERY 4 HOURS PRN
Status: CANCELLED | OUTPATIENT
Start: 2020-02-18

## 2020-02-18 RX ADMIN — CARBIDOPA AND LEVODOPA 1 TABLET: 50; 200 TABLET, EXTENDED RELEASE ORAL at 13:58

## 2020-02-18 RX ADMIN — SODIUM CHLORIDE, PRESERVATIVE FREE 10 ML: 5 INJECTION INTRAVENOUS at 10:01

## 2020-02-18 RX ADMIN — ENOXAPARIN SODIUM 40 MG: 40 INJECTION SUBCUTANEOUS at 10:01

## 2020-02-18 RX ADMIN — ASPIRIN 81 MG: 81 TABLET, COATED ORAL at 10:00

## 2020-02-18 RX ADMIN — ATORVASTATIN CALCIUM 80 MG: 80 TABLET, FILM COATED ORAL at 20:12

## 2020-02-18 RX ADMIN — CARBIDOPA AND LEVODOPA 1 TABLET: 50; 200 TABLET, EXTENDED RELEASE ORAL at 10:08

## 2020-02-18 RX ADMIN — SODIUM CHLORIDE, PRESERVATIVE FREE 10 ML: 5 INJECTION INTRAVENOUS at 20:12

## 2020-02-18 RX ADMIN — CARBIDOPA AND LEVODOPA 1 TABLET: 50; 200 TABLET, EXTENDED RELEASE ORAL at 20:12

## 2020-02-18 RX ADMIN — CLOPIDOGREL BISULFATE 75 MG: 75 TABLET ORAL at 13:58

## 2020-02-18 NOTE — PROGRESS NOTES
Patient was working with PT and complained of double vision and not feeling right is now sitting up in the chair blood pressure is 160/101 heart rate 82. Skin is warm and dry. Call placed to Dr Keena Keller office.  Message left

## 2020-02-18 NOTE — PROGRESS NOTES
[facility]    Patient:  Selina Franco  YOB: 1945  Date of Service: 2/18/2020  MRN: [id number]   Acct: [de-identified]   Primary Care Physician: Haylee Russell MD  Advance Directive: Full Code  Admit Date: 2/16/2020       Hospital Day: 1      CHIEF COMPLAINT:     Chief Complaint   Patient presents with    Aphasia     since this morning. worse as day went on.  Extremity Weakness     left leg. drags left leg while ambulating. last know well around 5pm, but could be earlier through the day and possibly last night.  Extremity Weakness     left arm. around 5pm or earlier today or last night.  Fall     several last few days. 2/18/2020 3:40 PM  Subjective / Interval History:   02/18/2020  No acute overnight event reported. Reportedly felt this a.m., with a head injury but reportedly hit his elbow. STAT X-ray of left elbow with no acute pathology or fracture. Patient with intermittent blurred vision. Denies any other acute complaints or distress at this time. Addendum  · Patient with reported persistent intermittent blurred vision, and lightheadedness, while working with PT this p.m. · CT head without contrast, reportsMild cerebral and cerebellar volume loss with chronic microvascular disease but no evidence of acute intracranial process        02/17/2020  Patient admitted this a.m. Patient seen and examined this a.m. Seen by neurologists. Laying comfortably in bed, in no acute distress. Left-sided weakness currently improved. Denies any other acute complaints or distress at this time. Family at bedside. Pending MRI and 2D echocardiogram.    Brief history  Mr. Ehsan Smith, a 58-year-old male, history of CAD, Parkinson's disease, presenting to the ED on account of an acute onset of unsteady gait, left-sided weakness, aphasia,. Patient admitted for further work-up and management of acute CVA.       Review of Systems:   Review of Systems  ROS: 14 point review of  sodium chloride 75 mL/hr at 02/17/20 0058      carbidopa-levodopa  1 tablet Oral TID    sodium chloride flush  10 mL Intravenous 2 times per day    enoxaparin  40 mg Subcutaneous Daily    aspirin  81 mg Oral Daily    Or    aspirin  300 mg Rectal Daily    atorvastatin  80 mg Oral Nightly    clopidogrel  75 mg Oral Daily     cloNIDine, sodium chloride flush, magnesium hydroxide, ondansetron, acetaminophen **OR** acetaminophen, labetalol  DIET GENERAL;       Labs:   CBC with DIFF:  Recent Labs     02/16/20 1925 02/17/20  0354   WBC 10.4 7.4   RBC 5.69 5.30   HGB 16.0 14.9   HCT 49.6 45.7   MCV 87.2 86.2   MCH 28.1 28.1   MCHC 32.3* 32.6*   RDW 13.5 13.5    192   MPV 10.3 10.5   NEUTOPHILPCT 82.5*  --    LYMPHOPCT 9.0*  --    MONOPCT 6.4  --    EOSRELPCT 1.1  --    BASOPCT 0.8  --    NEUTROABS 8.6*  --    LYMPHSABS 0.9*  --    MONOSABS 0.70  --    EOSABS 0.10  --    BASOSABS 0.10  --        CMP/BMP:  Recent Labs     02/16/20 1925      K 4.0      CO2 25   ANIONGAP 12   GLUCOSE 96   BUN 13   CREATININE 1.0   LABGLOM >60   CALCIUM 9.4   PROT 7.7   LABALBU 4.4   BILITOT 0.4   ALKPHOS 88   ALT <5*   AST 13         CRP:  No results for input(s): CRP in the last 72 hours. Sed Rate:  No results for input(s): SEDRATE in the last 72 hours. HgBA1c:  No components found for: HGBA1C  FLP:    Lab Results   Component Value Date    TRIG 151 02/17/2020    HDL 44 02/17/2020    LDLCALC 186 02/17/2020     TSH:  No results found for: TSH  Troponin T: No results for input(s): TROPONINI in the last 72 hours. Pro-BNP: No results for input(s): BNP in the last 72 hours. INR:   Recent Labs     02/16/20 1925   INR 1.06     ABGs: No results found for: PHART, PO2ART, DWE9AJT  UA:No results for input(s): NITRITE, COLORU, PHUR, LABCAST, WBCUA, RBCUA, MUCUS, TRICHOMONAS, YEAST, BACTERIA, CLARITYU, SPECGRAV, LEUKOCYTESUR, UROBILINOGEN, BILIRUBINUR, BLOODU, GLUCOSEU, AMORPHOUS in the last 72 hours.     Invalid demonstrated on bubble study consistent with PFO. Mitral valve is mildly thickened with normal leaflet mobility. Trace   mitral regurgitation. No stenosis. Aortic valve appears trileaflet with normal leaflet mobility. No   significant stenosis or regurgitation. Trace tricuspid regurgitation. Signature   ----------------------------------------------------------------   Electronically signed by Hema Francisco MD(Interpreting physician)   on 02/17/2020 10:42 PM   ----------------------------------------------------------------           Objective:   Vitals: BP (!) 143/92   Pulse 76   Temp 98.3 °F (36.8 °C) (Temporal)   Resp 16   Ht 6' (1.829 m)   Wt 168 lb (76.2 kg)   SpO2 92%   BMI 22.78 kg/m²   24HR INTAKE/OUTPUT:      Intake/Output Summary (Last 24 hours) at 2/18/2020 1540  Last data filed at 2/17/2020 1859  Gross per 24 hour   Intake 360 ml   Output 400 ml   Net -40 ml       Physical Exam  Nursing notes and vital signs reviewed  General appearance: No apparent distress, appears stated age and cooperative. Well developed and well groomed. HEENT: atraumatic, eyes with clear conjunctiva and normal lids, pupils and irises normal, external ears and nose are normal, lips normal. Hearing Intact. Lips with No blisters. Neck: Supple, with full range of motion. No jugular venous distention. Trachea midline. Thyroid no masses noted. No lympadenopathy or bruit. Lungs: Patient in no acute respiratory distress, no increased work of breathing, \"clear to auscultation bilaterally\" without rales, rhonchi or wheezes  Heart: regular rate and rhythm, S1, S2 normal, no murmur, click, rub or gallop  Abdomen: soft, non-tender; non-distended normal bowel sounds  Musculoskeletal: ROM Intact in B/L Upper and LE. No joint tenderness or Deformity throughout.   Extremities: extremities normal, atraumatic, no cyanosis or edema  Skin: Skin color, texture, turgor normal. No rashes or lesions  Psychiatric: Alert and oriented, affect

## 2020-02-18 NOTE — PROGRESS NOTES
Speech Language Pathology  Facility/Department: [facility] SURG SERVICES   SWALLOW THERAPY     NAME: Henry Kessler  : 1945  MRN: [id number]    ADMISSION DATE: 2020  ADMITTING DIAGNOSIS: has Neuropathy; Benign meningioma ([facility] [address] Memory difficulties; Non-smoker; Normal body mass index (BMI); OAB (overactive bladder); Primary Parkinsonism ([facility] [address] Prostate cancer ([facility] [address] BARBARA (stress urinary incontinence), male; and Acute CVA (cerebrovascular accident) [facility]) on their problem list.    Date of Treat: 2020  Evaluating Therapist: Daniel London    Reason for Referral  Henry Kessler was referred for a bedside swallow evaluation to assess the efficiency of his swallow function, identify signs and symptoms of aspiration and make recommendations regarding safe dietary consistencies, effective compensatory strategies, and safe eating environment. Impression  Observed patient's swallowing function. Patient exhibits slow oral prep of more solid consistencies as well as sluggish, inconsistently mild-moderately decreased laryngeal elevation for swallow airway protection. Even so, no outward S/S penetration/aspiration was noted with any regular solid consistency presentation or thin liquid presentation administered during treatment session this date.     At this time, recommend continuation current diet. Meds in pudding/applesauce. Assist during meals.     Treatment Plan  Requires SLP Intervention: Yes     Recommended Diet and Intervention  Diet Solids Recommendation: Regular solid  Liquid Consistency Recommendation: Thin   Recommended Form of Meds: Meds in puree as able  Therapeutic Interventions: Patient/Family education;Diet tolerance monitoring     Compensatory Swallowing Strategies  Compensatory Swallowing Strategies: Upright as possible for all oral intake;Assist feed;Small bites/sips;Eat/Feed slowly; Alternate solids and liquids; Remain upright for 30-45 minutes after meals     Treatment/Goals  Timeframe for

## 2020-02-18 NOTE — PROGRESS NOTES
Physical Therapy  Name: Daisy Almanza  MRN:  [id number]  Date of service:  2/18/2020 02/18/20 1118   Subjective   Subjective Pt states he is ready to walk. General Comment   Comments in bed   Bed Mobility   Supine to Sit Minimal assistance   Scooting Minimal assistance   Transfers   Sit to Stand Contact guard assistance   Stand to sit Contact guard assistance   Bed to Chair Contact guard assistance   Ambulation   Ambulation? Yes   Ambulation 1   Surface level tile   Device Rolling Walker   Assistance Contact guard assistance  (amb with 2 people due to pt falling in am)   Quality of Gait SLOW, DEC COORDINATION WHEN TAKING STEPS WITH L LE   Distance 200'   Comments pt increased amb distance without difficulty   Exercises   Hip Flexion 10x R   Knee Long Arc Quad 20x   Ankle Pumps 20x   Comments during ex pt began to see double and nursing was notified   Short term goals   Time Frame for Short term goals 2 WKS   Short term goal 1 SUP<>SIT, IND   Short term goal 2 SIT<>STAND, SBA   Short term goal 3  FT WITH RW AND CGA   Conditions Requiring Skilled Therapeutic Intervention   Body structures, Functions, Activity limitations Decreased functional mobility ; Decreased strength;Decreased balance;Decreased sensation;Decreased coordination;Decreased endurance   Assessment Pt was able to increase amb distance without LOB. P having increased blood pressure causing him to see double.     Activity Tolerance   Activity Tolerance Patient Tolerated treatment well       Electronically signed by Jose Juan Olea PTA on 2/18/2020 at 11:23 AM

## 2020-02-18 NOTE — PROGRESS NOTES
ataxia  Extremities-no clubbing, cyanosis or edema  Skin - warm, dry, and intact. No rash, erythema, or pallor. Psychiatric - mood, affect, and behavior appear normal.      Neurology  NEUROLOGICAL EXAM:  Awake, alert, fluent. mild confusion     Speech normal without dysarthria  No clear issues with language of fund of knowledge  Cranial Nerve Exam   CN II- Visual fields grossly unremarkable  CN III, IV,VI-EOMI, No nystagmus, conjugate eye movements, no ptosis  CN VII-no facial assymetry  CN VIII-Hearing intact to voice  CN IX and X- Palate elevates in midline  CN XI-good shoulder shrug  CN XII-Tongue midline with no fasciculations or fibrillations  Motor Exam  V/V throughout upper and lower extremities bilaterally, mild cogwheeling, normal tone  Reflexes   Trace Throughout    Tremors- no tremors in hands or head noted  Gait  Not tested  Coordination  Finger to nose-unremarkable  ? Nursing/pcp notes, imaging,labs and vitals reviewed. PT,OT and/or speech notes reviewed    Lab Results   Component Value Date    WBC 7.4 02/17/2020    HGB 14.9 02/17/2020    HCT 45.7 02/17/2020    MCV 86.2 02/17/2020     02/17/2020     Lab Results   Component Value Date     02/16/2020    K 4.0 02/16/2020     02/16/2020    CO2 25 02/16/2020    BUN 13 02/16/2020    CREATININE 1.0 02/16/2020    GLUCOSE 96 02/16/2020    CALCIUM 9.4 02/16/2020    PROT 7.7 02/16/2020    LABALBU 4.4 02/16/2020    BILITOT 0.4 02/16/2020    ALKPHOS 88 02/16/2020    AST 13 02/16/2020    ALT <5 (A) 02/16/2020    LABGLOM >60 02/16/2020     Lab Results   Component Value Date    INR 1.06 02/16/2020   No results found for: PHENYTOIN, ESR, CRP  EXAMINATION: MRI BRAIN WO CONTRAST 2/17/2020 1:59 PM   HISTORY: EXAM: MR BRAIN WITHOUT IV CONTRAST . COMPARISON: CT scan February 16, 2020    HISTORY: 75years-old Male.  Left leg weakness    TECHNIQUE:    Routine pulse sequences of the brain were obtained without IV   contrast.    REPORT:    The ventricles and cerebrospinal fluid spaces are normal in size and   configuration for the patient's age. There is a 1.5 x 3 cm right   frontal extra-axial mass. This is a meningioma. Restricted diffusion is noted in the right thalamus.    nonspecific bilateral white matter T2/FLAIR abnormal signal, with no   diffusion restriction, but likely reflecting chronic microvascular   changes. The brainstem, sella, pituitary, cerebellum are unremarkable. The   basilar cisterns are preserved. The intraorbital structures are   unremarkable. The flow voids are preserved. No acute osseous lesion. The visualized portions of the paranasal sinuses and mastoid air cells   are unremarkable.        Impression   1. Ischemic infarction right thalamus. 2. 1.5 x 3 cm right frontal meningioma   3. Changes of aging are noted with chronic microvascular ischemic   change in the paraventricular white matter. Contrast Medium: Bubble Study.     Indications:CVA.      Conclusions      Summary   Suboptimal echo study. LV is normal in size with normal systolic function. LV ejection fraction   estimated at 60%. Mild concentric LVH. Grade 1 diastolic dysfunction. RV is not fully visualized but appears enlarged in some views. Normal left atrial size. Right Atrium appears mildly enlarged in some views. Not felt visualized. Interatrial septum is not fully visualized. Right to left shunting is   demonstrated on bubble study consistent with PFO. Mitral valve is mildly thickened with normal leaflet mobility. Trace   mitral regurgitation. No stenosis. Aortic valve appears trileaflet with normal leaflet mobility. No   significant stenosis or regurgitation. Trace tricuspid regurgitation. Signature      RECORD REVIEW: Previous medical records, medications were reviewed at today's visit    IMPRESSION:   1. Right thalamic stroke. He was supposed to be taking aspirin at home but did not take it routinely.   Will place on aspirin 325 mg a day and discontinue Plavix. CTA unremarkable. MRI films reviewed. Continue PT/OT/SLP. Inpatient rehab consulted. Echocardiogram unremarkable but poor quality. Stroke is most likely small vessel. 2.  Right frontal meningioma. Followed by Dr. Edith Seals as outpatient. 3.  Parkinson's disease. Continue current medications. Stable    More than 35 minutes spent today reviewing the patient's records, films and and counseling. Long talk with wife.

## 2020-02-18 NOTE — PROGRESS NOTES
Physical Therapy  Name: Candida Roth  MRN:  [id number]  Date of service:  2/18/2020 02/18/20 7635   Subjective   Subjective Pt states he is willing to try and walk. General Comment   Comments Nurse Eleonora Earl assessed pt and felt he was safe to walk. Bed Mobility   Supine to Sit Contact guard assistance   Transfers   Sit to Stand Minimal Assistance   Stand to sit Minimal Assistance   Ambulation   Ambulation? Yes   Ambulation 1   Surface level tile   Device Rolling Walker   Assistance Contact guard assistance   Comments upon coming to standing pt became lightheaded and his vision became worse. Pt color drained from his face and he began to blink rapidly and get a little shakey while standing. At this point pt took a couple of steps to head of bed and laid back down. Blood pressure was taken 156/96 in supine. Short term goals   Time Frame for Short term goals 2 WKS   Short term goal 1 SUP<>SIT, IND   Short term goal 2 SIT<>STAND, SBA   Short term goal 3  FT WITH RW AND CGA   Conditions Requiring Skilled Therapeutic Intervention   Body structures, Functions, Activity limitations Decreased functional mobility ; Decreased strength;Decreased balance;Decreased sensation;Decreased coordination;Decreased endurance   Assessment Pt more shakey and weak, nursing notified of pt weakness.     Activity Tolerance   Activity Tolerance Patient limited by endurance       Electronically signed by Alice Fischer PTA on 2/18/2020 at 2:44 PM

## 2020-02-19 ENCOUNTER — HOSPITAL ENCOUNTER (INPATIENT)
Age: 75
LOS: 15 days | Discharge: HOME HEALTH | DRG: 057 | End: 2020-03-05
Attending: PSYCHIATRY & NEUROLOGY | Admitting: PSYCHIATRY & NEUROLOGY
Payer: MEDICARE

## 2020-02-19 VITALS
OXYGEN SATURATION: 90 % | HEIGHT: 72 IN | BODY MASS INDEX: 22.75 KG/M2 | DIASTOLIC BLOOD PRESSURE: 92 MMHG | WEIGHT: 168 LBS | SYSTOLIC BLOOD PRESSURE: 151 MMHG | RESPIRATION RATE: 16 BRPM | TEMPERATURE: 97.8 F | HEART RATE: 70 BPM

## 2020-02-19 PROBLEM — I63.9 ACUTE ISCHEMIC STROKE (HCC): Status: ACTIVE | Noted: 2020-02-19

## 2020-02-19 LAB
ANION GAP SERPL CALCULATED.3IONS-SCNC: 12 MMOL/L (ref 7–19)
BASOPHILS ABSOLUTE: 0.1 K/UL (ref 0–0.2)
BASOPHILS RELATIVE PERCENT: 1 % (ref 0–1)
BUN BLDV-MCNC: 19 MG/DL (ref 8–23)
CALCIUM SERPL-MCNC: 8.7 MG/DL (ref 8.8–10.2)
CHLORIDE BLD-SCNC: 106 MMOL/L (ref 98–111)
CO2: 24 MMOL/L (ref 22–29)
CREAT SERPL-MCNC: 0.9 MG/DL (ref 0.5–1.2)
EKG P AXIS: 48 DEGREES
EKG P-R INTERVAL: 208 MS
EKG Q-T INTERVAL: 398 MS
EKG QRS DURATION: 86 MS
EKG QTC CALCULATION (BAZETT): 405 MS
EKG T AXIS: 55 DEGREES
EOSINOPHILS ABSOLUTE: 0.4 K/UL (ref 0–0.6)
EOSINOPHILS RELATIVE PERCENT: 5.9 % (ref 0–5)
GFR NON-AFRICAN AMERICAN: >60
GLUCOSE BLD-MCNC: 95 MG/DL (ref 74–109)
HCT VFR BLD CALC: 43.2 % (ref 42–52)
HCT VFR BLD CALC: 45.1 % (ref 42–52)
HEMOGLOBIN: 13.8 G/DL (ref 14–18)
HEMOGLOBIN: 14.4 G/DL (ref 14–18)
IMMATURE GRANULOCYTES #: 0 K/UL
LV EF: 58 %
LVEF MODALITY: NORMAL
LYMPHOCYTES ABSOLUTE: 1.3 K/UL (ref 1.1–4.5)
LYMPHOCYTES RELATIVE PERCENT: 19.1 % (ref 20–40)
MCH RBC QN AUTO: 28.3 PG (ref 27–31)
MCH RBC QN AUTO: 28.3 PG (ref 27–31)
MCHC RBC AUTO-ENTMCNC: 31.9 G/DL (ref 33–37)
MCHC RBC AUTO-ENTMCNC: 31.9 G/DL (ref 33–37)
MCV RBC AUTO: 88.5 FL (ref 80–94)
MCV RBC AUTO: 88.8 FL (ref 80–94)
MONOCYTES ABSOLUTE: 0.7 K/UL (ref 0–0.9)
MONOCYTES RELATIVE PERCENT: 10.9 % (ref 0–10)
NEUTROPHILS ABSOLUTE: 4.3 K/UL (ref 1.5–7.5)
NEUTROPHILS RELATIVE PERCENT: 62.7 % (ref 50–65)
PDW BLD-RTO: 13.9 % (ref 11.5–14.5)
PDW BLD-RTO: 13.9 % (ref 11.5–14.5)
PLATELET # BLD: 171 K/UL (ref 130–400)
PLATELET # BLD: 192 K/UL (ref 130–400)
PMV BLD AUTO: 10.5 FL (ref 9.4–12.4)
PMV BLD AUTO: 10.6 FL (ref 9.4–12.4)
POTASSIUM REFLEX MAGNESIUM: 4.1 MMOL/L (ref 3.5–5)
PREALBUMIN: 23 MG/DL (ref 20–40)
RBC # BLD: 4.88 M/UL (ref 4.7–6.1)
RBC # BLD: 5.08 M/UL (ref 4.7–6.1)
SODIUM BLD-SCNC: 142 MMOL/L (ref 136–145)
WBC # BLD: 6.8 K/UL (ref 4.8–10.8)
WBC # BLD: 7.2 K/UL (ref 4.8–10.8)

## 2020-02-19 PROCEDURE — 93010 ELECTROCARDIOGRAM REPORT: CPT | Performed by: INTERNAL MEDICINE

## 2020-02-19 PROCEDURE — 2580000003 HC RX 258: Performed by: INTERNAL MEDICINE

## 2020-02-19 PROCEDURE — 1180000000 HC REHAB R&B

## 2020-02-19 PROCEDURE — 6370000000 HC RX 637 (ALT 250 FOR IP)

## 2020-02-19 PROCEDURE — 6370000000 HC RX 637 (ALT 250 FOR IP): Performed by: INTERNAL MEDICINE

## 2020-02-19 PROCEDURE — 97530 THERAPEUTIC ACTIVITIES: CPT

## 2020-02-19 PROCEDURE — 84134 ASSAY OF PREALBUMIN: CPT

## 2020-02-19 PROCEDURE — 6370000000 HC RX 637 (ALT 250 FOR IP): Performed by: PSYCHIATRY & NEUROLOGY

## 2020-02-19 PROCEDURE — 99152 MOD SED SAME PHYS/QHP 5/>YRS: CPT

## 2020-02-19 PROCEDURE — 36415 COLL VENOUS BLD VENIPUNCTURE: CPT

## 2020-02-19 PROCEDURE — 2580000003 HC RX 258: Performed by: HOSPITALIST

## 2020-02-19 PROCEDURE — 6360000002 HC RX W HCPCS: Performed by: PSYCHIATRY & NEUROLOGY

## 2020-02-19 PROCEDURE — 93005 ELECTROCARDIOGRAM TRACING: CPT | Performed by: INTERNAL MEDICINE

## 2020-02-19 PROCEDURE — 85027 COMPLETE CBC AUTOMATED: CPT

## 2020-02-19 PROCEDURE — 85025 COMPLETE CBC W/AUTO DIFF WBC: CPT

## 2020-02-19 PROCEDURE — 99223 1ST HOSP IP/OBS HIGH 75: CPT | Performed by: INTERNAL MEDICINE

## 2020-02-19 PROCEDURE — 6360000002 HC RX W HCPCS

## 2020-02-19 PROCEDURE — 93312 ECHO TRANSESOPHAGEAL: CPT | Performed by: INTERNAL MEDICINE

## 2020-02-19 PROCEDURE — 6360000002 HC RX W HCPCS: Performed by: HOSPITALIST

## 2020-02-19 PROCEDURE — 99232 SBSQ HOSP IP/OBS MODERATE 35: CPT | Performed by: PSYCHIATRY & NEUROLOGY

## 2020-02-19 PROCEDURE — 97535 SELF CARE MNGMENT TRAINING: CPT

## 2020-02-19 PROCEDURE — 93325 DOPPLER ECHO COLOR FLOW MAPG: CPT | Performed by: INTERNAL MEDICINE

## 2020-02-19 PROCEDURE — 93321 DOPPLER ECHO F-UP/LMTD STD: CPT

## 2020-02-19 PROCEDURE — 6370000000 HC RX 637 (ALT 250 FOR IP): Performed by: HOSPITALIST

## 2020-02-19 PROCEDURE — 93325 DOPPLER ECHO COLOR FLOW MAPG: CPT

## 2020-02-19 PROCEDURE — 80048 BASIC METABOLIC PNL TOTAL CA: CPT

## 2020-02-19 PROCEDURE — 97116 GAIT TRAINING THERAPY: CPT

## 2020-02-19 PROCEDURE — 93312 ECHO TRANSESOPHAGEAL: CPT

## 2020-02-19 RX ORDER — CARBIDOPA AND LEVODOPA 50; 200 MG/1; MG/1
1 TABLET, EXTENDED RELEASE ORAL 3 TIMES DAILY
Status: DISCONTINUED | OUTPATIENT
Start: 2020-02-19 | End: 2020-03-05 | Stop reason: HOSPADM

## 2020-02-19 RX ORDER — POLYETHYLENE GLYCOL 3350 17 G/17G
17 POWDER, FOR SOLUTION ORAL DAILY
Status: DISCONTINUED | OUTPATIENT
Start: 2020-02-19 | End: 2020-02-19 | Stop reason: SDUPTHER

## 2020-02-19 RX ORDER — SODIUM CHLORIDE 0.9 % (FLUSH) 0.9 %
10 SYRINGE (ML) INJECTION EVERY 12 HOURS SCHEDULED
Status: DISCONTINUED | OUTPATIENT
Start: 2020-02-19 | End: 2020-02-21

## 2020-02-19 RX ORDER — CLOPIDOGREL BISULFATE 75 MG/1
75 TABLET ORAL DAILY
Status: DISCONTINUED | OUTPATIENT
Start: 2020-02-20 | End: 2020-03-05 | Stop reason: HOSPADM

## 2020-02-19 RX ORDER — CLONIDINE HYDROCHLORIDE 0.1 MG/1
0.1 TABLET ORAL EVERY 4 HOURS PRN
Status: DISCONTINUED | OUTPATIENT
Start: 2020-02-19 | End: 2020-03-05 | Stop reason: HOSPADM

## 2020-02-19 RX ORDER — LISINOPRIL 20 MG/1
40 TABLET ORAL DAILY
Status: DISCONTINUED | OUTPATIENT
Start: 2020-02-20 | End: 2020-03-05 | Stop reason: HOSPADM

## 2020-02-19 RX ORDER — SODIUM CHLORIDE 9 MG/ML
INJECTION, SOLUTION INTRAVENOUS CONTINUOUS
Status: DISCONTINUED | OUTPATIENT
Start: 2020-02-19 | End: 2020-02-19

## 2020-02-19 RX ORDER — BISACODYL 10 MG
10 SUPPOSITORY, RECTAL RECTAL DAILY PRN
Status: DISCONTINUED | OUTPATIENT
Start: 2020-02-19 | End: 2020-03-05 | Stop reason: HOSPADM

## 2020-02-19 RX ORDER — ACETAMINOPHEN 325 MG/1
650 TABLET ORAL EVERY 4 HOURS PRN
Status: DISCONTINUED | OUTPATIENT
Start: 2020-02-19 | End: 2020-03-05 | Stop reason: HOSPADM

## 2020-02-19 RX ORDER — SODIUM CHLORIDE 9 MG/ML
INJECTION, SOLUTION INTRAVENOUS CONTINUOUS
OUTPATIENT
Start: 2020-02-19 | End: 2020-02-20

## 2020-02-19 RX ORDER — CLONIDINE HYDROCHLORIDE 0.1 MG/1
0.1 TABLET ORAL EVERY 4 HOURS PRN
Status: CANCELLED | OUTPATIENT
Start: 2020-02-19

## 2020-02-19 RX ORDER — POLYETHYLENE GLYCOL 3350 17 G/17G
17 POWDER, FOR SOLUTION ORAL DAILY
Status: DISCONTINUED | OUTPATIENT
Start: 2020-02-19 | End: 2020-03-05 | Stop reason: HOSPADM

## 2020-02-19 RX ORDER — ACETAMINOPHEN 325 MG/1
650 TABLET ORAL EVERY 4 HOURS PRN
Status: DISCONTINUED | OUTPATIENT
Start: 2020-02-19 | End: 2020-03-03 | Stop reason: SDUPTHER

## 2020-02-19 RX ORDER — ONDANSETRON 2 MG/ML
4 INJECTION INTRAMUSCULAR; INTRAVENOUS EVERY 6 HOURS PRN
Status: DISCONTINUED | OUTPATIENT
Start: 2020-02-19 | End: 2020-03-05 | Stop reason: HOSPADM

## 2020-02-19 RX ORDER — LISINOPRIL 20 MG/1
40 TABLET ORAL DAILY
Status: CANCELLED | OUTPATIENT
Start: 2020-02-20

## 2020-02-19 RX ORDER — SODIUM CHLORIDE 0.9 % (FLUSH) 0.9 %
10 SYRINGE (ML) INJECTION PRN
Status: DISCONTINUED | OUTPATIENT
Start: 2020-02-19 | End: 2020-02-21

## 2020-02-19 RX ORDER — LISINOPRIL 20 MG/1
40 TABLET ORAL DAILY
Status: DISCONTINUED | OUTPATIENT
Start: 2020-02-19 | End: 2020-02-19 | Stop reason: HOSPADM

## 2020-02-19 RX ORDER — ACETAMINOPHEN 650 MG/1
650 SUPPOSITORY RECTAL EVERY 4 HOURS PRN
Status: DISCONTINUED | OUTPATIENT
Start: 2020-02-19 | End: 2020-03-05 | Stop reason: HOSPADM

## 2020-02-19 RX ADMIN — ENOXAPARIN SODIUM 40 MG: 40 INJECTION SUBCUTANEOUS at 11:41

## 2020-02-19 RX ADMIN — LISINOPRIL 40 MG: 20 TABLET ORAL at 11:40

## 2020-02-19 RX ADMIN — CLOPIDOGREL BISULFATE 75 MG: 75 TABLET ORAL at 11:41

## 2020-02-19 RX ADMIN — CARBIDOPA AND LEVODOPA 1 TABLET: 50; 200 TABLET, EXTENDED RELEASE ORAL at 21:25

## 2020-02-19 RX ADMIN — SODIUM CHLORIDE, PRESERVATIVE FREE 10 ML: 5 INJECTION INTRAVENOUS at 21:25

## 2020-02-19 RX ADMIN — ENOXAPARIN SODIUM 40 MG: 40 INJECTION SUBCUTANEOUS at 21:25

## 2020-02-19 RX ADMIN — ASPIRIN 81 MG: 81 TABLET, COATED ORAL at 11:41

## 2020-02-19 RX ADMIN — SODIUM CHLORIDE, PRESERVATIVE FREE 10 ML: 5 INJECTION INTRAVENOUS at 11:42

## 2020-02-19 RX ADMIN — CARBIDOPA AND LEVODOPA 1 TABLET: 50; 200 TABLET, EXTENDED RELEASE ORAL at 14:15

## 2020-02-19 RX ADMIN — CARBIDOPA AND LEVODOPA 1 TABLET: 50; 200 TABLET, EXTENDED RELEASE ORAL at 11:40

## 2020-02-19 RX ADMIN — ACETAMINOPHEN 650 MG: 325 TABLET ORAL at 21:25

## 2020-02-19 RX ADMIN — SODIUM CHLORIDE: 9 INJECTION, SOLUTION INTRAVENOUS at 00:09

## 2020-02-19 ASSESSMENT — ENCOUNTER SYMPTOMS
NAUSEA: 0
RESPIRATORY NEGATIVE: 1
DIARRHEA: 0
EYES NEGATIVE: 1
SHORTNESS OF BREATH: 0
VOMITING: 0
GASTROINTESTINAL NEGATIVE: 1

## 2020-02-19 ASSESSMENT — PAIN SCALES - GENERAL
PAINLEVEL_OUTOF10: 7
PAINLEVEL_OUTOF10: 0
PAINLEVEL_OUTOF10: 8

## 2020-02-19 ASSESSMENT — PAIN DESCRIPTION - DESCRIPTORS: DESCRIPTORS: DULL;ACHING

## 2020-02-19 ASSESSMENT — PAIN DESCRIPTION - PAIN TYPE: TYPE: ACUTE PAIN

## 2020-02-19 ASSESSMENT — PAIN DESCRIPTION - LOCATION
LOCATION: BACK;SHOULDER;ANKLE
LOCATION: ANKLE;BACK;SHOULDER

## 2020-02-19 ASSESSMENT — PAIN DESCRIPTION - ONSET: ONSET: ON-GOING

## 2020-02-19 ASSESSMENT — PAIN DESCRIPTION - ORIENTATION: ORIENTATION: RIGHT;LEFT

## 2020-02-19 ASSESSMENT — PAIN DESCRIPTION - FREQUENCY: FREQUENCY: INTERMITTENT

## 2020-02-19 ASSESSMENT — PAIN - FUNCTIONAL ASSESSMENT: PAIN_FUNCTIONAL_ASSESSMENT: PREVENTS OR INTERFERES SOME ACTIVE ACTIVITIES AND ADLS

## 2020-02-19 ASSESSMENT — PAIN DESCRIPTION - PROGRESSION: CLINICAL_PROGRESSION: NOT CHANGED

## 2020-02-19 NOTE — CONSULTS
 BARBARA (stress urinary incontinence), male 10/10/2016    Vitamin B12 deficiency         Past Surgical History:  Past Surgical History:   Procedure Laterality Date    BACK SURGERY      MOHS SURGERY      NECK SURGERY      PROSTATECTOMY         Past Family History:  History reviewed. No pertinent family history.     Past Social History:  Social History     Socioeconomic History    Marital status:      Spouse name: Not on file    Number of children: Not on file    Years of education: Not on file    Highest education level: Not on file   Occupational History    Not on file   Social Needs    Financial resource strain: Not on file    Food insecurity:     Worry: Not on file     Inability: Not on file    Transportation needs:     Medical: Not on file     Non-medical: Not on file   Tobacco Use    Smoking status: Never Smoker    Smokeless tobacco: Never Used   Substance and Sexual Activity    Alcohol use: No    Drug use: No    Sexual activity: Not on file   Lifestyle    Physical activity:     Days per week: Not on file     Minutes per session: Not on file    Stress: Not on file   Relationships    Social connections:     Talks on phone: Not on file     Gets together: Not on file     Attends Yarsanism service: Not on file     Active member of club or organization: Not on file     Attends meetings of clubs or organizations: Not on file     Relationship status: Not on file    Intimate partner violence:     Fear of current or ex partner: Not on file     Emotionally abused: Not on file     Physically abused: Not on file     Forced sexual activity: Not on file   Other Topics Concern    Not on file   Social History Narrative     50+ years    He has 2 daughters    Never in the [employer]    He was a [employer] [address]    Presently not driving    No special hobbies or interest    Physically sedentary    No history of alcohol or tobacco consumption or substance usage (1.829 m). Weight as of this encounter: 168 lb (76.2 kg). Physical Exam  Vitals signs reviewed. Constitutional:       General: He is not in acute distress. Appearance: Normal appearance. He is well-developed. He is not ill-appearing, toxic-appearing or diaphoretic. HENT:      Head: Normocephalic and atraumatic. Nose: Nose normal.      Mouth/Throat:      Mouth: Mucous membranes are moist.      Pharynx: Oropharynx is clear. Eyes:      General: No scleral icterus. Extraocular Movements: Extraocular movements intact. Pupils: Pupils are equal, round, and reactive to light. Neck:      Musculoskeletal: Normal range of motion and neck supple. No neck rigidity or muscular tenderness. Vascular: No carotid bruit or JVD. Cardiovascular:      Rate and Rhythm: Normal rate and regular rhythm. Heart sounds: Normal heart sounds. No murmur. No friction rub. No gallop. Pulmonary:      Effort: Pulmonary effort is normal. No respiratory distress. Breath sounds: Normal breath sounds. No stridor. No wheezing, rhonchi or rales. Chest:      Chest wall: No tenderness. Abdominal:      General: Abdomen is flat. Bowel sounds are normal. There is no distension. Palpations: Abdomen is soft. There is no mass. Tenderness: There is no abdominal tenderness. There is no right CVA tenderness, left CVA tenderness, guarding or rebound. Hernia: No hernia is present. Musculoskeletal:         General: No swelling. Lymphadenopathy:      Cervical: No cervical adenopathy. Skin:     General: Skin is warm and dry. Neurological:      General: No focal deficit present. Mental Status: He is alert. Mental status is at baseline. Cranial Nerves: No cranial nerve deficit. Sensory: No sensory deficit. Motor: No weakness.       Coordination: Coordination normal.      Comments:  well to command bilaterally moves all extremities  No obvious facial asymmetry  Speech very low in volume and somewhat slow to respond but appears to understand   Psychiatric:         Mood and Affect: Mood normal.         Behavior: Behavior normal.         Thought Content: Thought content normal.         Judgment: Judgment normal.         Labs:  Recent Labs     02/16/20 1925 02/17/20 0354 02/19/20 0317   WBC 10.4 7.4 7.2   HGB 16.0 14.9 14.4    192 171       Recent Labs     02/16/20 1925 02/19/20 0317    142   K 4.0 4.1    106   CO2 25 24   BUN 13 19   CREATININE 1.0 0.9   LABGLOM >60 >60   CALCIUM 9.4 8.7*       CK, CKMB, Troponin: @LABRCNT (CKTOTAL:3, CKMB:3, TROPONINI:3)@    Last 3 BNP:  No results for input(s): BNP in the last 72 hours. IMAGING:  Cta Head W Wo Contrast    Result Date: 2/16/2020  CTA HEAD W WO CONTRAST 2/16/2020 8:45 PM History: Stroke symptoms. Left leg weakness. In order to have a CT radiation dose as low as reasonably achievable Automated Exposure Control was utilized for adjustment of the mA and/or KV according to patient size. DLP in mGycm= 736. CT angiography protocol. CT imaging with bolus IV contrast injection. Under concurrent supervision axial, sagittal, coronal, and three-dimensional data sets were constructed. Normally patent distal internal carotid arteries. There is atherosclerotic calcification near each carotid terminus and there is 50% narrowing at the right ICA terminus. No intraluminal thrombus. Patent and normally symmetric middle cerebral arteries. Normal and symmetric anterior cerebral arteries. Normal and symmetric posterior cerebral arteries. The dural venous sinuses are patent. Normal variant asymmetry with a diminutive left transverse sinus. 1. Atherosclerotic calcification with approximately 50% narrowing of the distal right ICA. The [place] is intact and there is no intraluminal thrombus. Signed by Dr Rolando Landis on 2/16/2020 8:48 PM    Xr Elbow Left (min 3 Views)    Result Date: 2/18/2020  Examination. XR ELBOW LEFT (MIN 3 VIEWS) 2/18/2020 7:30 AM History: Trauma. Frontal, oblique and lateral views of the left elbow are obtained. There is no previous study for comparison. There is no evidence recent fracture or dislocation. The anterior fat plane is in normal position. No bony erosion. No soft tissue swelling or a mass. A negative study. Signed by Dr Samina Cummins on 2/18/2020 9:21 AM    Ct Head Wo Contrast    Result Date: 2/18/2020  EXAMINATION: CT HEAD WO CONTRAST 2/18/2020 3:20 PM HISTORY: CT BRAIN without contrast 2/18/2020 1:45 PM HISTORY: Dizzy syncope COMPARISON: None DLP: 763 mGy cm TECHNIQUE: Serial axial tomographic images of the brain were obtained without the use of intravenous contrast. FINDINGS: The midline structures are nondisplaced. There is mild cerebral and cerebellar volume loss, with an associated increase in the prominence of the ventricles and sulci. The basilar cisterns are normal in size and configuration. There is no evidence of intracranial hemorrhage or mass-effect. There is low attenuation in the periventricular white matter, consistent with chronic ischemic change. There are no abnormal extra-axial fluid collections. There is no evidence of tonsillar herniation. The included orbits and their contents are unremarkable. The visualized paranasal sinuses, mastoid air cells and middle ear cavities are clear. The visualized osseous structures and overlying soft tissues of the skull and face are intact. Mild cerebral and cerebellar volume loss with chronic microvascular disease but no evidence of acute intracranial process. Signed by Dr Jovanna Priest on 2/18/2020 3:25 PM    Ct Head Wo Contrast    Result Date: 2/16/2020  CT HEAD WO CONTRAST 2/16/2020 8:20 PM History: Stroke symptoms. Left leg weakness. In order to have a CT radiation dose as low as reasonably achievable Automated Exposure Control was utilized for adjustment of the mA and/or KV according to patient size.  DLP in mGycm= 856. Noncontrast head CT with no comparison. Mild cortical atrophy. Moderate small vessel disease. Normal ventricle size. No hemorrhage or mass effect. No cortical infarct is seen. Clear paranasal sinuses. 1. Age-related changes with atrophy and small vessel disease. 2. No acute intracranial abnormality is seen. Signed by Dr Iris Mcfarlane on 2/16/2020 8:21 PM    Cta Neck W Contrast    Result Date: 2/16/2020  CTA NECK W CONTRAST 2/16/2020 8:42 PM History: Left leg weakness. Stroke symptoms. In order to have a CT radiation dose as low as reasonably achievable Automated Exposure Control was utilized for adjustment of the mA and/or KV according to patient size. DLP in mGycm= 736. CT angiography protocol. CT imaging with bolus IV contrast injection. Under concurrent supervision axial, sagittal, coronal, and three-dimensional data sets were constructed. Normally patent aortic arch. Patent great vessel origins. Tortuous proximal left subclavian and left common carotid artery. Normally patent common carotid arteries. Calcified atherosclerotic plaque at both carotid bifurcations with no significant ICA stenosis. The internal carotid arteries are symmetric and normally patent. Mildly asymmetric though normally patent vertebral arteries. Disc space narrowing greatest at C5-6 and C6-7.    1. Mild atherosclerotic plaque at the carotid bifurcations. 2. No ICA stenosis. Signed by Dr Iris Mcfarlane on 2/16/2020 8:45 PM    Mri Brain Wo Contrast    Result Date: 2/17/2020  EXAMINATION: MRI BRAIN WO CONTRAST 2/17/2020 1:59 PM HISTORY: EXAM: MR BRAIN WITHOUT IV CONTRAST . COMPARISON: CT scan February 16, 2020 HISTORY: 75years-old Male. Left leg weakness TECHNIQUE: Routine pulse sequences of the brain were obtained without IV contrast. REPORT: The ventricles and cerebrospinal fluid spaces are normal in size and configuration for the patient's age. There is a 1.5 x 3 cm right frontal extra-axial mass.  This is a meningioma. Restricted diffusion is noted in the right thalamus. nonspecific bilateral white matter T2/FLAIR abnormal signal, with no diffusion restriction, but likely reflecting chronic microvascular changes. The brainstem, sella, pituitary, cerebellum are unremarkable. The basilar cisterns are preserved. The intraorbital structures are unremarkable. The flow voids are preserved. No acute osseous lesion. The visualized portions of the paranasal sinuses and mastoid air cells are unremarkable. 1. Ischemic infarction right thalamus. 2. 1.5 x 3 cm right frontal meningioma 3. Changes of aging are noted with chronic microvascular ischemic change in the paraventricular white matter. Dr. Magali Minor was notified of these findings Signed by Dr Allison Covington on 2/17/2020 2:11 PM      Assessment:  1. Recent onset of gait unsteadiness and left leg weakness  2. MRI of the brain 2/17/2020 ischemic infarction right thalamus 1.5 x 3 cm right frontal meningioma age-related changes consistent with chronic microvascular ischemic changes  3. CTA head 2/16/2020 atherosclerotic calcifications 50% narrowing distal right internal carotid artery [place] intact no intraluminal thrombus  4. CTA neck 2/16/2020 mild atherosclerotic plaque carotid bifurcations no ICA stenosis  5. Echocardiogram 2/16/2020 normal left ventricular systolic function concentric LVH grade 1 diastolic dysfunction right to left shunting consistent with PFO trace MR trace TR  6. Neuropathy  7. Memory difficulties  8. Overactive bladder  9. Parkinson's  10. Prostate cancer  11. Stress urinary incontinence      Recommendations:  1. Proceed with transesophageal echocardiography discussed with patient advised indications alternatives benefits and risk he is agreeable wishes to proceed. ASA score: 3  Airway score:  Mallampati class I  Patient is acceptable candidate for moderate intravenous conscious sedation

## 2020-02-19 NOTE — PROGRESS NOTES
[facility] Progress Note    Patient:  Robbie Giraldo  YOB: 1945  Date of Service: 2/19/2020  MRN: [id number]   Acct: [de-identified]   Primary Care Physician: Kendall Billings MD  Advance Directive: Full Code  Admit Date: 2/16/2020       Hospital Day: 2      CHIEF COMPLAINT:     Chief Complaint   Patient presents with    Aphasia     since this morning. worse as day went on.  Extremity Weakness     left leg. drags left leg while ambulating. last know well around 5pm, but could be earlier through the day and possibly last night.  Extremity Weakness     left arm. around 5pm or earlier today or last night.  Fall     several last few days. 2/19/2020 8:11 AM  Subjective / Interval History:   02/19/2020  Patient with reported intermittent dizziness and blurred vision. Status post ELIZABETH this a.m. Endorses some blurry vision, although slightly improved from yesterday. Denies any other acute complaints or distress at this time. Laying comfortably in bed in no acute distress. Family at bedside. 02/18/2020  No acute overnight event reported. Reportedly felt this a.m., with a head injury but reportedly hit his elbow. STAT X-ray of left elbow with no acute pathology or fracture. Patient with intermittent blurred vision. Denies any other acute complaints or distress at this time. Addendum  · Patient with reported persistent intermittent blurred vision, and lightheadedness, while working with PT this p.m. · CT head without contrast, reports Mild cerebral and cerebellar volume loss with chronic microvascular disease but no evidence of acute intracranial process        02/17/2020  Patient admitted this a.m. Patient seen and examined this a.m. Seen by neurologists. Laying comfortably in bed, in no acute distress. Left-sided weakness currently improved. Denies any other acute complaints or distress at this time. Family at bedside.  Pending MRI and 2D echocardiogram.    Brief history  Mr. Faustino Soares, a 50-year-old male, history of CAD, Parkinson's disease, presenting to the ED on account of an acute onset of unsteady gait, left-sided weakness, aphasia,. Patient admitted for further work-up and management of acute CVA. Review of Systems:   Review of Systems  ROS: 14 point review of systems is negative except as specifically addressed above.     Diet NPO Time Specified    Intake/Output Summary (Last 24 hours) at 2/19/2020 0811  Last data filed at 2/19/2020 0205  Gross per 24 hour   Intake 145 ml   Output 500 ml   Net -355 ml       Medications:   sodium chloride 75 mL/hr at 02/19/20 0009    sodium chloride 75 mL/hr at 02/17/20 0058     Current Facility-Administered Medications   Medication Dose Route Frequency Provider Last Rate Last Dose    lisinopril (PRINIVIL;ZESTRIL) tablet 40 mg  40 mg Oral Daily Quynh Pathak MD        carbidopa-levodopa (SINEMET CR)  MG per extended release tablet 1 tablet  1 tablet Oral TID Quynh Pathak MD   1 tablet at 02/18/20 2012    cloNIDine (CATAPRES) tablet 0.1 mg  0.1 mg Oral Q4H PRN Amy Hair MD        0.9 % sodium chloride infusion   Intravenous Continuous Chana Peters MD 75 mL/hr at 02/19/20 0009      sodium chloride flush 0.9 % injection 10 mL  10 mL Intravenous 2 times per day Aiden Mccoy MD   10 mL at 02/18/20 2012    sodium chloride flush 0.9 % injection 10 mL  10 mL Intravenous PRN Aiden Mccoy MD        magnesium hydroxide (MILK OF MAGNESIA) 400 MG/5ML suspension 30 mL  30 mL Oral Daily PRN Aiden Mccoy MD        ondansetron [facility]) injection 4 mg  4 mg Intravenous Q6H PRN Aiden Mccoy MD        enoxaparin (LOVENOX) injection 40 mg  40 mg Subcutaneous Daily Aiden Mccoy MD   40 mg at 02/18/20 1001    aspirin EC tablet 81 mg  81 mg Oral Daily Aiden Mccoy MD   81 mg at 02/18/20 1000    Or    aspirin suppository 300 mg  300 mg Rectal Daily Aiden Mccoy MD        0.9 % sodium chloride infusion   Intravenous Continuous Maria Elena Beach MD 75 mL/hr at 02/17/20 0058      acetaminophen (TYLENOL) tablet 650 mg  650 mg Oral Q4H PRN Maria Elena Beach MD        Or    acetaminophen (TYLENOL) suppository 650 mg  650 mg Rectal Q4H PRN Maria Elena Beach MD        atorvastatin (LIPITOR) tablet 80 mg  80 mg Oral Nightly Maria Elena Beach MD   80 mg at 02/18/20 2012    labetalol (NORMODYNE;TRANDATE) injection syringe 10 mg  10 mg Intravenous Q10 Min PRN Maria Elena Beach MD        clopidogrel (PLAVIX) tablet 75 mg  75 mg Oral Daily Bethany Madrid MD   75 mg at 02/18/20 1358         sodium chloride 75 mL/hr at 02/19/20 0009    sodium chloride 75 mL/hr at 02/17/20 0058      lisinopril  40 mg Oral Daily    carbidopa-levodopa  1 tablet Oral TID    sodium chloride flush  10 mL Intravenous 2 times per day    enoxaparin  40 mg Subcutaneous Daily    aspirin  81 mg Oral Daily    Or    aspirin  300 mg Rectal Daily    atorvastatin  80 mg Oral Nightly    clopidogrel  75 mg Oral Daily     cloNIDine, sodium chloride flush, magnesium hydroxide, ondansetron, acetaminophen **OR** acetaminophen, labetalol  Diet NPO Time Specified       Labs:   CBC with DIFF:  Recent Labs     02/16/20  1925 02/17/20  0354 02/19/20  0317   WBC 10.4 7.4 7.2   RBC 5.69 5.30 5.08   HGB 16.0 14.9 14.4   HCT 49.6 45.7 45.1   MCV 87.2 86.2 88.8   MCH 28.1 28.1 28.3   MCHC 32.3* 32.6* 31.9*   RDW 13.5 13.5 13.9    192 171   MPV 10.3 10.5 10.6   NEUTOPHILPCT 82.5*  --   --    LYMPHOPCT 9.0*  --   --    MONOPCT 6.4  --   --    EOSRELPCT 1.1  --   --    BASOPCT 0.8  --   --    NEUTROABS 8.6*  --   --    LYMPHSABS 0.9*  --   --    MONOSABS 0.70  --   --    EOSABS 0.10  --   --    BASOSABS 0.10  --   --        CMP/BMP:  Recent Labs     02/16/20 1925 02/19/20  0317    142   K 4.0 4.1    106   CO2 25 24   ANIONGAP 12 12   GLUCOSE 96 95   BUN 13 19   CREATININE 1.0 0.9   LABGLOM >60 >60   CALCIUM 9.4 8.7*   PROT 7.7  -- Normally patent common carotid arteries. Calcified atherosclerotic plaque at both carotid bifurcations with no significant ICA stenosis. The internal carotid arteries are symmetric and normally patent. Mildly asymmetric though normally patent vertebral arteries. Disc space narrowing greatest at C5-6 and C6-7.    1. Mild atherosclerotic plaque at the carotid bifurcations. 2. No ICA stenosis. Signed by Dr Gloria Thomas on 2/16/2020 8:45 PM      Echo:   Summary   Suboptimal echo study. LV is normal in size with normal systolic function. LV ejection fraction   estimated at 60%. Mild concentric LVH. Grade 1 diastolic dysfunction. RV is not fully visualized but appears enlarged in some views. Normal left atrial size. Right Atrium appears mildly enlarged in some views. Not felt visualized. Interatrial septum is not fully visualized. Right to left shunting is   demonstrated on bubble study consistent with PFO. Mitral valve is mildly thickened with normal leaflet mobility. Trace   mitral regurgitation. No stenosis. Aortic valve appears trileaflet with normal leaflet mobility. No   significant stenosis or regurgitation. Trace tricuspid regurgitation. Signature   ----------------------------------------------------------------   Electronically signed by Reyna Francisco MD(Interpreting physician)   on 02/17/2020 10:42 PM   ----------------------------------------------------------------           Objective:   Vitals: BP (!) 173/97   Pulse 64   Temp 97.3 °F (36.3 °C) (Temporal)   Resp 20   Ht 6' (1.829 m)   Wt 168 lb (76.2 kg)   SpO2 92%   BMI 22.78 kg/m²   24HR INTAKE/OUTPUT:      Intake/Output Summary (Last 24 hours) at 2/19/2020 0146  Last data filed at 2/19/2020 0205  Gross per 24 hour   Intake 145 ml   Output 500 ml   Net -355 ml       Physical Exam  Nursing notes and vital signs reviewed  General appearance: No apparent distress, appears stated age and cooperative.  Well developed and well Neuropathy     Benign meningioma ([facility] 03/29/2017    Memory difficulties 03/29/2017    Non-smoker 03/29/2017    Normal body mass index (BMI) 03/29/2017    Primary Parkinsonism ([facility] 03/29/2017    Prostate cancer ([facility] 10/10/2016    BARBARA (stress urinary incontinence), male 10/10/2016       Hospital Problems           Last Modified POA    Acute CVA (cerebrovascular accident) ([facility] 2/17/2020 Yes          Active Problems:    Acute CVA (cerebrovascular accident) [facility])  Resolved Problems:    * No resolved hospital problems. *    Acute CVA -right thalamic ischemia. · -Initially started on Dual Antiplatelet Therapy (DAPT) with aspirin and clopidogrel. · Clopidogrel discontinued, as per neurology recommendation  · Patient to continue taking aspirin 325 mg p.o. daily. · - Statin: Atorvastatin 80 mg by mouth nightly     · Patient with reported intermittent blurred vision, and lightheadedness,  · Repeat CT head without contrast (02/18/2020), reports Mild cerebral and cerebellar volume loss with chronic microvascular disease but no evidence of acute intracranial process    - Cardiac assessment:   · --> TTE, with agitated saline bubble study: 02/17/2020: Normal LV size and systolic function. Estimated EF 60%. Grade 1 diastolic dysfunction. Right Atrium appears mildly enlarged in some views. Not felt visualized. Interatrial septum is not fully visualized. Right to left shunting is demonstrated on bubble study consistent with PFO. · Cardiology following, for reported PFO and 2D echo  · Transesophageal echocardiogram pending (02/19/2020)      - Brain Imaging - Per Stroke protocol,:    · --> CT Head WO Con : 02/17/2020: Impression: Age-related changes with atrophy and small vessel disease. . No acute intracranial abnormality is seen  · --> MRI Brain W / WO Con: 02/17/2020: Impression: Ischemic infarction right thalamus. 1.5 x 3 cm right frontal meningioma.  Changes of aging are noted with chronic microvascular ischemic change in the paraventricular white matter    - Vessel Imaging:   · --> CTA Head W / WO Con : 02/17/2020: Impression: Atherosclerotic calcification with approximately 50% narrowing of the distal right ICA. The [place] of [place] is intact and there is no intraluminal thrombus. · --> CTA Neck W  Con: 02/17/2020: Impression: Mild atherosclerotic plaque at the carotid bifurcations. No ICA stenosis. Rehab consultations:    - PT/OT - SLP   -Neuro check Q4 hours. Nadeem Schmitz -Neurology following -appreciate recommendations                Continue management of other chronic medical conditions - see above and orders. Advance Directive: Full Code    Diet NPO Time Specified     DVT prophylaxis: Enoxaparin    Consults Made:   IP CONSULT TO NEUROLOGY  IP CONSULT TO REHAB/TCU ADMISSION COORDINATOR  IP CONSULT TO CARDIOLOGY  IP CONSULT TO REHAB/TCU ADMISSION COORDINATOR  IP CONSULT TO REHAB/TCU ADMISSION COORDINATOR  IP CONSULT TO CARDIOLOGY    Discharge planning: tbd -plan to transfer to acute rehab (eighth floor), however patient's with reported blurry vision and intermittent dizziness. Likely to be transferred to acute rehab, later today versus tomorrow. Pending reevaluation by PT OT. Time Spent is 25 mins in the examination, evaluation, counseling and review of medications, assessment and plan.      Electronically signed by   José Bob MD, MPH,   Internal Medicine Hospitalist   2/19/2020 8:11 AM

## 2020-02-19 NOTE — PROGRESS NOTES
warm, dry, and intact. No rash, erythema, or pallor. Psychiatric - mood, affect, and behavior appear normal.      Neurology  NEUROLOGICAL EXAM:  Awake, alert, fluent. mild confusion     Speech normal without dysarthria  No clear issues with language of fund of knowledge  Cranial Nerve Exam   CN II- Visual fields grossly unremarkable  CN III, IV,VI-EOMI, No nystagmus, conjugate eye movements, no ptosis  CN VII-no facial assymetry  CN VIII-Hearing intact to voice  CN IX and X- Palate elevates in midline  CN XI-good shoulder shrug  CN XII-Tongue midline with no fasciculations or fibrillations  Motor Exam  V/V throughout upper and lower extremities bilaterally, mild cogwheeling, normal tone  Reflexes   Trace Throughout    Tremors- no tremors in hands or head noted  Gait  Not tested  Coordination  Finger to nose-unremarkable  ? Nursing/pcp notes, imaging,labs and vitals reviewed. PT,OT and/or speech notes reviewed    Lab Results   Component Value Date    WBC 7.2 02/19/2020    HGB 14.4 02/19/2020    HCT 45.1 02/19/2020    MCV 88.8 02/19/2020     02/19/2020     Lab Results   Component Value Date     02/19/2020    K 4.1 02/19/2020     02/19/2020    CO2 24 02/19/2020    BUN 19 02/19/2020    CREATININE 0.9 02/19/2020    GLUCOSE 95 02/19/2020    CALCIUM 8.7 (L) 02/19/2020    PROT 7.7 02/16/2020    LABALBU 4.4 02/16/2020    BILITOT 0.4 02/16/2020    ALKPHOS 88 02/16/2020    AST 13 02/16/2020    ALT <5 (A) 02/16/2020    LABGLOM >60 02/19/2020     Lab Results   Component Value Date    INR 1.06 02/16/2020   No results found for: PHENYTOIN, ESR, CRP  EXAMINATION: MRI BRAIN WO CONTRAST 2/17/2020 1:59 PM   HISTORY: EXAM: MR BRAIN WITHOUT IV CONTRAST . COMPARISON: CT scan February 16, 2020    HISTORY: 75years-old Male.  Left leg weakness    TECHNIQUE:    Routine pulse sequences of the brain were obtained without IV   contrast.    REPORT:    The ventricles and cerebrospinal fluid spaces are normal

## 2020-02-19 NOTE — PROGRESS NOTES
Type of ROM/Therapeutic Exercise  Type of ROM/Therapeutic Exercise: AROM  Comment: Agustin UE AROM 10x1. Plan   Plan  Times per week: 3-5x weekly  G-Code     OutComes Score                                                  AM-PAC Score             Goals  Short term goals  Time Frame for Short term goals: 1-2 weeks  Short term goal 1: The patient will demo ability to perform transfers with CGA  Short term goal 2: Perform standing aspects of dressing and toileting with CGA  Short term goal 3: Perform seated aspects of dressing with AE with CGA  Short term goal 4:  Independent with bedside therapeutic activity recommendations  Short term goal 5: Perform toileting with CGA  Long term goals  Long term goal 1: Upgrade as tolerated       Therapy Time   Individual Concurrent Group Co-treatment   Time In           Time Out           Minutes                   Carlito Lucy, EWELINA

## 2020-02-19 NOTE — PROGRESS NOTES
Physical Therapy  Name: Yue Alaniz  MRN:  [id number]  Date of service:  2/19/2020 02/19/20 1511   Subjective   Subjective Pt states he is ready to try and walk   Bed Mobility   Supine to Sit Stand by assistance   Scooting Stand by assistance   Transfers   Sit to Stand Contact guard assistance   Stand to sit Contact guard assistance   Comment pt was able to put his socks and shoes on   Ambulation   Ambulation? Yes   Ambulation 1   Surface level tile   Device Rolling Walker   Assistance Contact guard assistance   Quality of Gait slow scissoring steps   Gait Deviations Decreased step length;Decreased step height;Slow Neli   Distance 100'   Comments pt having blurry vision and stated his vision was wavy, pt stated he would not have tried to walk unless someone was holding to him because of the lightheadedness and blurred vision. Short term goals   Time Frame for Short term goals 2 WKS   Short term goal 1 SUP<>SIT, IND   Short term goal 2 SIT<>STAND, SBA   Short term goal 3  FT WITH RW AND CGA   Conditions Requiring Skilled Therapeutic Intervention   Body structures, Functions, Activity limitations Decreased functional mobility ; Decreased strength;Decreased balance;Decreased sensation;Decreased coordination;Decreased endurance   Assessment Pt was able to increase amb distance with assistance. vc's to widen DRAEK during amb. Activity Tolerance   Activity Tolerance Patient Tolerated treatment well;Patient limited by endurance   Safety Devices   Type of devices Left in chair;Call light within reach; Chair alarm in place  (family present)       Electronically signed by Kal Sherwood PTA on 2/19/2020 at 3:16 PM

## 2020-02-19 NOTE — DISCHARGE SUMMARY
raised symmetrically. CN XI: Sternocleidomastoids are full strength bilaterally. CN XII: tongue protrudes in midline.      Motor/ Musculoskeleton:   Bulk and tone were normal bilaterally. Muscle strength was 5/5 bilaterally in upper extremities and lower extremities. No pronator drift. Sensation:  Intact sensation    Coord: There was no dysmetria on finger-to-nose.          Discharge Medications:       Paula Loja [facility] Medication Instructions [facility]:[id number]    Printed on:02/19/20 8801   Medication Information                      acetaminophen (TYLENOL) 325 MG tablet  Take 650 mg by mouth as needed for Pain             carbidopa-levodopa (SINEMET CR)  MG per extended release tablet  Take 3 tablets by mouth daily             carvedilol (COREG) 6.25 MG tablet  Take 6.25 mg by mouth nightly             cyanocobalamin ([employer] VITAMIN B12) 1000 MCG tablet  Take 1,000 mcg by mouth daily             DULoxetine (CYMBALTA) 60 MG extended release capsule  Take 1 capsule by mouth daily             lisinopril (PRINIVIL;ZESTRIL) 2.5 MG tablet  Take 1 tablet by mouth daily                 Discharge Instructions: Follow up with Stephanie Heller MD in 7 days. Take medications as directed. Resume activity as tolerated. Diet: DIET CARDIAC;        DISCHARGE STATUS:    Condition: Good  Disposition: Patient is medically stable and will be discharged Acute rehab - 8th floor    Extended Emergency Contact Information  Primary Emergency Contact: Rizwana Loja  Address: [facility] [place] Almanzar, [address] [place] [address] Phone: [phone]  Relation: Spouse  Secondary Emergency Contact: Nilesh Case [address] Phone: [phone]  Mobile Phone: [phone]  Relation: Child       Time Spent on discharge is more than 36 mins in the examination, evaluation, counseling and review of medications and discharge plan.      Electronically signed by   Mackenzie Bragg MD, MPH, Internal Medicine Hospitalist   2/19/2020 4:59 PM      Thank you Blanche Adams MD for the opportunity to be involved in this patient's care. If you have any questions or concerns please feel free to contact me at [phone]        EMR Dragon/Transcription disclaimer:   Much of this encounter note is an electronic transcription/translation of spoken language to printed text.  The electronic translation of spoken language may permit erroneous, or at times, nonsensical words or phrases to be inadvertently transcribed; although attempts have made to review the note for such errors, some may still exist.

## 2020-02-20 LAB
ANION GAP SERPL CALCULATED.3IONS-SCNC: 14 MMOL/L (ref 7–19)
BASOPHILS ABSOLUTE: 0.1 K/UL (ref 0–0.2)
BASOPHILS RELATIVE PERCENT: 0.8 % (ref 0–1)
BILIRUBIN URINE: NEGATIVE
BLOOD, URINE: NEGATIVE
BUN BLDV-MCNC: 15 MG/DL (ref 8–23)
CALCIUM SERPL-MCNC: 8.9 MG/DL (ref 8.8–10.2)
CHLORIDE BLD-SCNC: 104 MMOL/L (ref 98–111)
CLARITY: CLEAR
CO2: 22 MMOL/L (ref 22–29)
COLOR: YELLOW
CREAT SERPL-MCNC: 0.8 MG/DL (ref 0.5–1.2)
EOSINOPHILS ABSOLUTE: 0.4 K/UL (ref 0–0.6)
EOSINOPHILS RELATIVE PERCENT: 5.2 % (ref 0–5)
GFR NON-AFRICAN AMERICAN: >60
GLUCOSE BLD-MCNC: 92 MG/DL (ref 74–109)
GLUCOSE URINE: NEGATIVE MG/DL
HCT VFR BLD CALC: 44.6 % (ref 42–52)
HEMOGLOBIN: 14.1 G/DL (ref 14–18)
IMMATURE GRANULOCYTES #: 0 K/UL
KETONES, URINE: NEGATIVE MG/DL
LEUKOCYTE ESTERASE, URINE: NEGATIVE
LYMPHOCYTES ABSOLUTE: 1.3 K/UL (ref 1.1–4.5)
LYMPHOCYTES RELATIVE PERCENT: 17.2 % (ref 20–40)
MCH RBC QN AUTO: 28.1 PG (ref 27–31)
MCHC RBC AUTO-ENTMCNC: 31.6 G/DL (ref 33–37)
MCV RBC AUTO: 89 FL (ref 80–94)
MONOCYTES ABSOLUTE: 0.8 K/UL (ref 0–0.9)
MONOCYTES RELATIVE PERCENT: 9.7 % (ref 0–10)
NEUTROPHILS ABSOLUTE: 5.2 K/UL (ref 1.5–7.5)
NEUTROPHILS RELATIVE PERCENT: 66.7 % (ref 50–65)
NITRITE, URINE: NEGATIVE
PDW BLD-RTO: 13.9 % (ref 11.5–14.5)
PH UA: 6.5 (ref 5–8)
PLATELET # BLD: 181 K/UL (ref 130–400)
PMV BLD AUTO: 10.7 FL (ref 9.4–12.4)
POTASSIUM REFLEX MAGNESIUM: 3.8 MMOL/L (ref 3.5–5)
PROTEIN UA: NEGATIVE MG/DL
RBC # BLD: 5.01 M/UL (ref 4.7–6.1)
SODIUM BLD-SCNC: 140 MMOL/L (ref 136–145)
SPECIFIC GRAVITY UA: 1.01 (ref 1–1.03)
UROBILINOGEN, URINE: 1 E.U./DL
WBC # BLD: 7.8 K/UL (ref 4.8–10.8)

## 2020-02-20 PROCEDURE — 97165 OT EVAL LOW COMPLEX 30 MIN: CPT

## 2020-02-20 PROCEDURE — 96125 COGNITIVE TEST BY HC PRO: CPT

## 2020-02-20 PROCEDURE — 97110 THERAPEUTIC EXERCISES: CPT

## 2020-02-20 PROCEDURE — 36415 COLL VENOUS BLD VENIPUNCTURE: CPT

## 2020-02-20 PROCEDURE — 99223 1ST HOSP IP/OBS HIGH 75: CPT | Performed by: PSYCHIATRY & NEUROLOGY

## 2020-02-20 PROCEDURE — 80048 BASIC METABOLIC PNL TOTAL CA: CPT

## 2020-02-20 PROCEDURE — 97129 THER IVNTJ 1ST 15 MIN: CPT

## 2020-02-20 PROCEDURE — 92522 EVALUATE SPEECH PRODUCTION: CPT

## 2020-02-20 PROCEDURE — 85025 COMPLETE CBC W/AUTO DIFF WBC: CPT

## 2020-02-20 PROCEDURE — 81003 URINALYSIS AUTO W/O SCOPE: CPT

## 2020-02-20 PROCEDURE — 97161 PT EVAL LOW COMPLEX 20 MIN: CPT

## 2020-02-20 PROCEDURE — 6360000002 HC RX W HCPCS: Performed by: PSYCHIATRY & NEUROLOGY

## 2020-02-20 PROCEDURE — 2580000003 HC RX 258: Performed by: INTERNAL MEDICINE

## 2020-02-20 PROCEDURE — 1180000000 HC REHAB R&B

## 2020-02-20 PROCEDURE — 6370000000 HC RX 637 (ALT 250 FOR IP): Performed by: INTERNAL MEDICINE

## 2020-02-20 PROCEDURE — 92610 EVALUATE SWALLOWING FUNCTION: CPT

## 2020-02-20 PROCEDURE — 97116 GAIT TRAINING THERAPY: CPT

## 2020-02-20 PROCEDURE — 97535 SELF CARE MNGMENT TRAINING: CPT

## 2020-02-20 PROCEDURE — 87086 URINE CULTURE/COLONY COUNT: CPT

## 2020-02-20 RX ORDER — IBUPROFEN 200 MG
TABLET ORAL DAILY PRN
Status: DISCONTINUED | OUTPATIENT
Start: 2020-02-20 | End: 2020-03-05 | Stop reason: HOSPADM

## 2020-02-20 RX ADMIN — SODIUM CHLORIDE, PRESERVATIVE FREE 10 ML: 5 INJECTION INTRAVENOUS at 20:02

## 2020-02-20 RX ADMIN — LISINOPRIL 40 MG: 20 TABLET ORAL at 07:51

## 2020-02-20 RX ADMIN — CARBIDOPA AND LEVODOPA 1 TABLET: 50; 200 TABLET, EXTENDED RELEASE ORAL at 13:54

## 2020-02-20 RX ADMIN — CARBIDOPA AND LEVODOPA 1 TABLET: 50; 200 TABLET, EXTENDED RELEASE ORAL at 20:02

## 2020-02-20 RX ADMIN — CARBIDOPA AND LEVODOPA 1 TABLET: 50; 200 TABLET, EXTENDED RELEASE ORAL at 07:51

## 2020-02-20 RX ADMIN — ENOXAPARIN SODIUM 40 MG: 40 INJECTION SUBCUTANEOUS at 20:02

## 2020-02-20 RX ADMIN — CLOPIDOGREL BISULFATE 75 MG: 75 TABLET ORAL at 07:52

## 2020-02-20 RX ADMIN — SODIUM CHLORIDE, PRESERVATIVE FREE 10 ML: 5 INJECTION INTRAVENOUS at 07:55

## 2020-02-20 ASSESSMENT — PAIN SCALES - GENERAL: PAINLEVEL_OUTOF10: 0

## 2020-02-20 NOTE — PLAN OF CARE
overall strength  :  Long term goals  Time Frame for Long term goals : 2 weeks   Long term goal 1: pt will complete overall toileting with modified independence  Long term goal 2: pt will complete overall bathing with modified independence  Long term goal 3: pt will complete overall upper body dressing with modified independence   Long term goal 4: pt will complete overall lower body dressing with modified independence   Long term goal 5: pt will complete toilet transfers with modified independence   Long term goals 6: pt will complete HEP independently   Long term goal 7: pt will verbalize DME needs  :     These goals were reviewed with this patient at the time of assessment and Lisa Saleem is in agreement    Plan of Care: Frequency:   [] 5 days per week, 90 minutes per day     [x] 5 days per week, 60 minutes per day                Plan  Current Treatment Recommendations: Strengthening, Wheelchair Mobility Training, ROM, Safety Education & Training, Balance Training, Patient/Caregiver Education & Training, Self-Care / ADL, Cognitive/Perceptual Training, Functional Mobility Training, Neuromuscular Re-education, Equipment Evaluation, Education, & procurement, Home Management Training, Endurance Training, Cognitive Reorientation            IRF-NIKKY  Eating  Assistance Needed: Supervision or touching assistance  CARE Score: 4  Discharge Goal: Independent  Oral Hygiene  Assistance Needed: Supervision or touching assistance  CARE Score: 4  Discharge Goal: [address] Needed: Supervision or touching assistance  CARE Score: 4  Discharge Goal: Independent  Shower/Bathe Self  Assistance Needed: Supervision or touching assistance  CARE Score: 4  Discharge Goal: Independent  Upper Body Dressing  Assistance Needed: Supervision or touching assistance  CARE Score: 4  Discharge Goal: Independent  Lower Body Dressing  Assistance Needed: Supervision or touching assistance  CARE Score: 4  Discharge Goal: Independent  Putting On/Taking Off Footwear  Assistance Needed: Supervision or touching assistance  Comment: required increased time   CARE Score: 4  Discharge Goal: Independent  Toilet Transfer  Assistance Needed: Partial/moderate assistance  Comment: min A   CARE Score: 3  Discharge Goal: Independent  Picking Up Object  Assistance Needed: Substantial/maximal assistance  CARE Score: 2  Discharge Goal: Supervision or touching assistance  Treatments may include IADL retraining, strengthening, safety education and training, patient/caregiver education and training, equipment evaluation/ training/procurement, neuromuscular reeducation, wheelchair mobility training. SPEECH THERAPY:   Plan of Care and Goals:   LTG Goal 1: The patient will comprehend communication related to basic medical and social needs and utilize compensatory strategies to maintain safety and participate socially in functional living environment. GOAL 2: The patient will develop functional cognitive linguistic based skills and utilize compensatory strategies to communicate wants and needs effectively to different conversational partners, maintain safety and participate socially in functional living environment. FIM Goals: Comprehension: GOAL: Comprehension: 6  Expression: GOAL: Expression: 6  Social: GOAL: Social Interaction: 7  Problem Solving: GOAL: Problem Solvin  Memory: GOAL: Memory: 5                    Short-term Goals  Timeframe for Short-term Goals: 2-3 steps  Goal 1: Patient will utilize compensatory strategies for short-term memory/recall tasks and during ADLs with minimal cues required to utilize. Goal 2: Patient will complete problem solving/safety awareness tasks during functional ADL's in order to increase functional integration into the environment and decreased assistance from caregivers with 80% accuracy with minimal cues required to complete.   Goal 3: The patient will follow multi step commands related to functional living environment with 100% accuracy and min verbal cues in order to increase functional integration into environment. Goal 4: The patient will complete simple/complex naming tasks to improve verbal expression and thought organization for effective communication in all settings with min verbal cues at 80% accuracy. Goal 5: The patient will complete executive function tasks (organizing, planning, self monitoring) with 80% accuracy and min verbal cues during daily living activities to improve safety and awareness in functional living environment. Timeframe for Short-term Goals: 2-3 steps    IRF-NIKKY  Hearing, Speech, and Vision  Expression of Ideas and Wants: Without difficulty  Understanding Verbal and Non-Verbal Content: Understands  Cognitive Patterns  Cognitive Pattern Assessment Used: BIMS  Repetition of Three Words (First Attempt): 3  Temporal Orientation: Year: Correct  Temporal Orientation: Month: Accurate within 5 days  Temporal Orientation: Day: Incorrect or no answer  Able to recall \"sock: Yes, after cueing (\"something to wear\")  Able to recall \"blue\": Yes, no cue required  Able to recall \"bed\": No, could not recall  BIMS Summary Score: 11          Plan of Care:  Frequency:   [x] 5 days per week, 60 minutes per day                            [] Not appropriate for Speech Therapy  Treatments may include speech/language/communication therapy, cognitive training, group therapy, education, and/or dysphagia therapy based on the above goals. These goals were reviewed with this patient at the time of assessment and Earlene Grajeda is in agreement. CASE MANAGEMENT:  Goals:   Assist patient/family with discharge planning, patient/family counseling,  and coordination with insurance during ARU stay.   Patient Goals: get words out, regain strength of left leg, also learn how to manage/deal with the Parkinsons               Activities Prior to Admit:   Homemaking

## 2020-02-20 NOTE — PROGRESS NOTES
Occupational Therapy   Occupational Therapy Initial Assessment  Date: 2020   Patient Name: Connie Villarreal  MRN: [id number]     : 1945    Date of Service: 2020    Discharge Recommendations:  Home with Home health OT     Assessment   Performance deficits / Impairments: Decreased functional mobility ; Decreased ADL status; Decreased balance;Decreased safe awareness;Decreased coordination;Decreased fine motor control;Decreased high-level IADLs;Decreased cognition;Decreased endurance;Decreased ROM; Decreased vision/visual deficit; Decreased strength  Assessment: Pt benefits from skilled OT services to address decreased functional mobility, endurance, fine motor control, ROM, coordination, strength, balance, safety awareness, high-level IADLs, and ADL status to increase independence for returning home. Treatment Diagnosis: Right CVA, left sided weakness   Prognosis: Good  Decision Making: Low Complexity  History: Parkinson's, neuropathy, hypertension   OT Education: OT Role;Plan of Care  Activity Tolerance  Activity Tolerance: Patient Tolerated treatment well  Safety Devices  Safety Devices in place: Yes  Type of devices: (left with PT )         Patient Diagnosis(es): There were no encounter diagnoses. has a past medical history of Cancer ([facility] [address] Neuropathy, Parkinson disease ([facility] [address] BARBARA (stress urinary incontinence), male, and Vitamin B12 deficiency. has a past surgical history that includes Prostatectomy; Neck surgery; back surgery; and Mohs surgery.     Treatment Diagnosis: Right CVA, left sided weakness       Restrictions  Restrictions/Precautions  Restrictions/Precautions: Fall Risk    Subjective   General  Chart Reviewed: Yes  Patient assessed for rehabilitation services?: Yes  Additional Pertinent Hx: Parkinson's disease, history of prostate cancer, peripheral neuropathy, hypertension, right frontal meningioma  Family / Caregiver Present: Yes  Diagnosis: Right CVA     Social/Functional History  Social/Functional History  Lives With: Spouse  Type of Home: House  Home Layout: One level  Home Access: Stairs to enter with rails  Entrance Stairs - Number of Steps: 3  Entrance Stairs - Rails: Right  Bathroom Shower/Tub: Tub/Shower unit  Bathroom Toilet: Handicap height  Bathroom Equipment: Shower chair, Grab bars in shower  Bathroom Accessibility: Accessible  Home Equipment: Rolling walker, [place] Help From: Family  ADL Assistance: Independent  Homemaking Assistance: Independent  Homemaking Responsibilities: Yes  Ambulation Assistance: Independent  Transfer Assistance: Independent  Active : Yes  Occupation: Retired     Objective   Vision: Impaired(reports blury vision )  Vision Exceptions: Wears glasses at all times  Hearing: Exceptions to [facility]  Hearing Exceptions: Hard of hearing/hearing concerns;Bilateral hearing aid(pt was not wearing for session )    Orientation  Overall Orientation Status: Within Functional Limits     Balance  Sitting Balance: Stand by assistance  Standing Balance: Minimal assistance  Functional Mobility  Functional - Mobility Device: Rolling Walker  Activity: To/from bathroom  Assist Level: Minimal assistance  Functional Mobility Comments: required multiple vc's to avoid bumping into wall     Coordination  Movements Are Fluid And Coordinated: No  Coordination and Movement description: Fine motor impairments; Left UE     Transfers  Stand Step Transfers: Minimal assistance  Sit to stand: Minimal assistance  Stand to sit: Contact guard assistance     Cognition  Overall Cognitive Status: Exceptions  Safety Judgement: Decreased awareness of need for safety    LUE AROM (degrees)  LUE AROM : WFL  Left Hand AROM (degrees)  Left Hand AROM: WFL  RUE AROM (degrees)  RUE AROM : WFL  Right Hand AROM (degrees)  Right Hand AROM: WFL  LUE Strength  Gross LUE Strength: WFL  RUE Strength  Gross RUE Strength: [facility]      02/20/20 1300   Eating   Assistance Needed Supervision or touching assistance   CARE Score 4   Discharge Goal 6   Oral Hygiene   Assistance Needed Supervision or touching assistance   CARE Score 4   Discharge Goal 6   [address] Needed Supervision or touching assistance   CARE Score 4   Discharge Goal 6   Shower/Bathe Self   Assistance Needed Supervision or touching assistance   CARE Score 4   Discharge Goal 6   Upper Body Dressing   Assistance Needed Supervision or touching assistance   CARE Score 4   Discharge Goal 6   Lower Body Dressing   Assistance Needed Supervision or touching assistance   CARE Score 4   Discharge Goal 6   Putting On/Taking Off Footwear   Assistance Needed Supervision or touching assistance   Comment required increased time    CARE Score 4   Discharge Goal 6      02/20/20 1300   Toilet Transfer   Assistance Needed Partial/moderate assistance   Comment min A    CARE Score 3   Discharge Goal 6      02/20/20 1300   Picking Up Object   Assistance Needed Substantial/maximal assistance   CARE Score 2   Discharge Goal 4     Plan   Plan  Current Treatment Recommendations: Strengthening, Wheelchair Mobility Training, ROM, Safety Education & Training, Balance Training, Patient/Caregiver Education & Training, Self-Care / ADL, Cognitive/Perceptual Training, Functional Mobility Training, Neuromuscular Re-education, Equipment Evaluation, Education, & procurement, Home Management Training, Endurance Training, Cognitive Reorientation    Goals  Short term goals  Time Frame for Short term goals: 1 week   Short term goal 1: pt will complete overall toileting with supervision   Short term goal 2: pt will complete overall bathing with supervision   Short term goal 3: pt will complete overall upper body dressing with supervision   Short term goal 4: pt will complete overall lower body dressing with supervision   Short term goal 5: pt will complete toilet transfers with supervision   Short term goal 6: pt will complete 5 bilateral strengthening acts to increase overall strength   Long term goals  Time Frame for Long term goals : 2 weeks   Long term goal 1: pt will complete overall toileting with modified independence  Long term goal 2: pt will complete overall bathing with modified independence  Long term goal 3: pt will complete overall upper body dressing with modified independence   Long term goal 4: pt will complete overall lower body dressing with modified independence   Long term goal 5: pt will complete toilet transfers with modified independence   Long term goals 6: pt will complete HEP independently   Long term goal 7: pt will verbalize DME needs   Patient Goals   Patient goals : to return home        Therapy Time   Individual Concurrent Group Co-treatment   Time In 1300         Time Out 1400         Minutes 60         Timed Code Treatment Minutes: 45 Minutes       [employer]

## 2020-02-20 NOTE — PROGRESS NOTES
Physical Therapy     Name: Lisa Monge  MRN:  [id number]  Date of service:  2/20/2020 02/20/20 1458   Restrictions/Precautions   Restrictions/Precautions Fall Risk   Bed Mobility   Sit to Supine Stand by assistance   Transfers   Sit to Stand Contact guard assistance   Stand to sit Contact guard assistance   Car Transfer Contact guard assistance   Stairs/Curb   Stairs? Yes   Stairs   # Steps  4   Stairs Height 4\"   Rails Bilateral   Assistance Contact guard assistance   Comment Step to pattern, RLE leading ascending, LLE leading descending    Propulsion 1   Propulsion Manual   Level Level Tile   Method RUE;LUE;RLE;LLE   Level of Assistance Contact guard assistance   Description/ Details Pt can self correct path deviations, adequate velocity    Distance 200ft  (with turns )   Balance   Posture Good   Sitting - Static Good   Sitting - Dynamic Good   Standing - Static Fair;+   Standing - Dynamic Fair;+   Conditions Requiring Skilled Therapeutic Intervention   Assessment Pt completed treatment with good effort and form. He safely performed car transfer and stair navigation with CGA. Pt propelled his wc back to his room and was left lying in bed with his needs within reach post treatment.    Activity Tolerance   Activity Tolerance Patient Tolerated treatment well       Electronically signed by Hay Bolanos Student PT on 2/20/2020 at 3:12 PM

## 2020-02-20 NOTE — PROGRESS NOTES
[facility]  SPEECH THERAPY  [facility] 8 REHAB UNIT  Speech - Language - Cognitive Evaluation    TIME      Timed Code Treatment Minutes: 15 Minutes      Name: Santiago Hwang  YOB: 1945  Gender: male  Referring Physician: Ryan Ospinar  Admitting diagnosis: Right thalamic stroke     Secondary diagnoses: Parkinson's disease, history of prostate cancer, peripheral neuropathy, hypertension, right frontal meningioma      History of Present Illness/Injury: This 75 y.o. male  with history of Parkinson's disease followed by ,HTN,depression and right frontal meningioma that is followed by Dr. Rome Rodriguez as outpatient. He presented to [facility] ER on 2/16/20 with c/o unsteadiness of gain,left leg weakness and occasional changes in speech that started sometime during the night and continued throughout the day on Sunday. CT of head and CTA head/neck were negative. He was not a candidate for TPA d/t being out of the timeframe. He was admitted to the hospitalist service with consult for neurology. He was seen by neurologist Dr. Rolando Wu, who felt he had had a right hemispheric stroke. MRI done confirmed a right thalamic stroke. He was supposed to be taking aspirin at home but did not take it routinely. Dr. Rolando Wu placed him back on aspirin 325 mg a day and discontinued Plavix. Patient was evaluated by SPT and found to exhibit slow oral prep of more solid consistencies as well as sluggish, inconsistently mildly decreased laryngeal elevation for swallow airway protection. Patient is currently on a regular diet with thin liquids, medicines to be given in pudding or applesauce with assist for feeding. Patient also exhibited slow processing, delayed auditory comprehension, delayed verbal responses with mild-moderate fragmentations observed where the patient started expressions but then discontinued, and decreased high level reasoning/problem solving.  Patient continues to have left sided weakness and had an assisted fall with to assist in the patient's recall of paragraph details.       Symbol trails and mazes were nonlinguistic tasks that were presented to assess planning, self-monitoring/executive function skills, working memory, mental flexibility and visual planning and discrimination. Increased difficulty noted with maze task secondary to decreased processing. With simple symbol trails patient completed with 100% accuracy. With more complex tasks increased difficulty noted. Patient completing with 10%      Symbol Cancellation was a nonlinguistic task of visual attention and perception. It provides information regarding the integrity of the upper and lower quadrants of the left and right visual fields. Errors of omission and commission may be secondary to generalized inattention, visual discrimination deficits, partial or full hemianopsia, visual neglect and /or inattention to one side and/or quadrant of space. Patient crossing out the wrong symbol during the task. Patient only correctly crossing out one of the symbols.      Deficits in confrontational naming are typically a key symptom of aphasia that can result from various forms of brain damage (e.g. stroke, head injury, dementia, tumors, and infections). Patient naming pictures with 100% accuracy. During generative naming tasks, patient demonstrated with significant difficulty with organization of thought or retrieving items within a semantic class/category given a time constraint. The term executive function describes a set of cognitive abilities that control and regulate other abilities and behaviors. Executive functions are necessary for goal-directed behavior. They include the ability to initiate and stop actions, to monitor and change behavior as needed, and to plan future behavior when faced with novel tasks and situations. Executive functions allow us to anticipate outcomes and adapt to changing situations.  The ability to form concepts and think abstractly is often considered components of executive function.      As the name implies, executive functions are high-level abilities that influence more basic abilities like attention, memory and motor skills. Executive functions are important for successful adaptation and performance in real-life situations. They allow people to initiate and complete tasks and to persevere in the face of challenges. Because the environment can be unpredictable, executive functions are vital to human ability to recognize the significance of unexpected situations and to make alternative plans quickly when unusual events arise and interfere with normal routines. In this way, executive function contributes to success in work and at home and allows people to manage the stresses of daily life. When generating ideas related to reasoning/problem solving tasks, patient demonstrated mod/severe difficulty throughout evaluation.          Cognitive Linguistic Quick Test:  COGNITIVE DOMAIN SCORE SEVERITY RANGE SEVERITY RATING   Attention 33 39-0 Severe   Memory 107 114-80 Moderate   Executive Function 12 13-8 Moderate   Language 24 24-16 Moderate   Visuospatial Skills 29 36-22 Moderate   Composite Severity Rating  1.8 2.4-1.5  Moderate         Precautions: Fall    Subjective:  Patient alert and cooperative with all therapy tasks. Receptive: Auditory Comprehension:  Patient following simple 1 step commands increased difficulty with 2/3 step commands. Patient comprehending basic questions with no difficulty, however the more complex patient noted to have more difficulty. Visual/Reading Comprehension:   Patient reported blurred vision at times, however no difficulty with visual scanning during structured tasks. Expressive Skills/ Verbal Expression:  Primary Mode of Expression: Verbal  Initiation: Mild   Repetition: (WFL)  Automatic Speech: (WFL)  Confrontation: (WFL)  Convergent: Moderate  Divergent:  Moderate  Responsive: Mild     Mild decreased initiation and responsiveness during evaluation. Patient noted to have slow/delayed responses and required extended time to answer. Automatic, confrontation naming, and repetition appeared to be [facility]. Increased difficulty noted with naming tasks and thought organization within conversational discourse. Motor Speech/Dysarthria:   Motor Speech: Exceptions to [facility]  Dysarthria : Mild  Intelligibility: Mild    Oral Motor:  Labial ROM: Reduced left  Labial Symmetry: Abnormal symmetry left  Labial Strength: Reduced  Labial Coordination: Reduced   Lingual Coordination: Reduced    Problem Solving:   Problem Solving: Exceptions to [facility]    Abstract reasoning:    Abstract Reasoning: Exceptions to [facility]    Safety Awareness:   Safety/Judgement: Exceptions to [facility]  Decreased safety awareness secondary to cognitive deficits. Will require education and strategies. Number Sequencing:  Numeric Reasoning: Exceptions to [facility]   Writing:   Dominant Hand: Left  Will address further in therapy. VISION/HEARING:    Vision: Impaired(Does report some blurry vision)   Hearing: Exceptions to [facility]      FIMS:    Comprehension: 4 - Patient understands basic needs 75-90%+ of the time  Expression: 4 - Expresses basic ideas/needs 75-90%+ of the time  Social Interaction: 5 - Patient is appropriate with supervision/cues  Problem Solvin - Patient solves simple/routine tasks 75-90%+    Memory: 3 - Patient remembers 50%-74% of the time      REHAB POTENTIAL: [] Excellent [x] Good [] Fair  [] Poor    EDUCATION:   Learner: [x]Patient [] Significant other [] Son/Daughter [] Parent     [] Other:   Education: [] Reviewed results and recommendations of this evaluation     [] Reviewed diet and strategies     [] Reviewed signs, symptoms and risk of aspiration     [] Demonstrated how to thick liquid appropriately.      [x] Reviewed goals and Plan of Care     [] OTHER:   Method: [x] Discussion [x] Demonstration [] Hand-out     []

## 2020-02-20 NOTE — PROGRESS NOTES
Back from procedure is alert but drowsy. To wait 30 minutes before offering water or food .  Per heart cath dept

## 2020-02-20 NOTE — H&P
decreased high level reasoning/problem solving. Patient continues to have left sided weakness and had an assisted fall with staff on 2/18/20 bumping his left elbow on the door frame. X-ray of left elbow was negative. Patient also started having c/o double vision & being lightheaded on 2/18/20. Repeat CT of head showed no changes. Echo done suggested PFO. Cardiology was consulted for ELIZABETH. He was seen by Dr. Franchesca Gongora and went for ELIZABETH on 2/19/20. Patient still with some lightheadedness and blurry vision but has improved. Patient is participating in PT/OT/SPT. He is felt to need a stay on Rehab for continued therapy to work towards his goal of returning home with his wife. He is now felt ready to start the Rehab program.    REVIEW OF SYSTEMS:  Constitutional - No fever or chills. No diaphoresis or significant fatigue. HENT -  No tinnitus or significant hearing loss. Eyes - no sudden vision change or eye pain  Respiratory - no significant shortness of breath or cough  Cardiovascular - no chest pain No palpitations or significant leg swelling  Gastrointestinal - no abdominal swelling or pain. Genitourinary - No difficulty urinating, dysuria  Musculoskeletal - no back pain or myalgia. Skin - no color change or rash  Neurologic - No seizures. No lateralizing weakness. Hematologic - no easy bruising or excessive bleeding. Psychiatric - no severe anxiety or nervousness. All other review of systems are negative.       Past Medical History:      Diagnosis Date    Cancer [facility])     prostate    Neuropathy     Parkinson disease ([facility] [address]     BARBARA (stress urinary incontinence), male 10/10/2016    Vitamin B12 deficiency        Past Surgical History:      Procedure Laterality Date    BACK SURGERY      MOHS SURGERY      NECK SURGERY      PROSTATECTOMY         Medications in Hospital:      Current Facility-Administered Medications:     cloNIDine (CATAPRES) tablet 0.1 mg, 0.1 mg, Oral, Q4H PRN, Skip Moses MD  [facility] lisinopril (PRINIVIL;ZESTRIL) tablet 40 mg, 40 mg, Oral, Daily, Skip Moses MD    acetaminophen (TYLENOL) tablet 650 mg, 650 mg, Oral, Q4H PRN **OR** acetaminophen (TYLENOL) suppository 650 mg, 650 mg, Rectal, Q4H PRN, Skip Moses MD    magnesium hydroxide (MILK OF MAGNESIA) 400 MG/5ML suspension 30 mL, 30 mL, Oral, Daily PRN, Skip Moses MD    [facility]) injection 4 mg, 4 mg, Intravenous, Q6H PRN, Skip Moses MD    sodium chloride flush 0.9 % injection 10 mL, 10 mL, Intravenous, 2 times per day, Skip Moses MD, 10 mL at 02/19/20 2125    sodium chloride flush 0.9 % injection 10 mL, 10 mL, Intravenous, PRN, Skip Moses MD    carbidopa-levodopa (SINEMET CR)  MG per extended release tablet 1 tablet, 1 tablet, Oral, TID, Skip Moses MD, 1 tablet at 02/19/20 2125    clopidogrel (PLAVIX) tablet 75 mg, 75 mg, Oral, Daily, Skip Moses MD    acetaminophen (TYLENOL) tablet 650 mg, 650 mg, Oral, Q4H PRN, Bear Bell MD, 650 mg at 02/19/20 2125    polyethylene glycol (GLYCOLAX) packet 17 g, 17 g, Oral, Daily, Bear Bell MD    bisacodyl (DULCOLAX) suppository 10 mg, 10 mg, Rectal, Daily PRN, Bear Bell MD    enoxaparin (LOVENOX) injection 40 mg, 40 mg, Subcutaneous, Nightly, Bear Bell MD, 40 mg at 02/19/20 2125    Allergies:  Finasteride; Other; and Sulfa antibiotics    Social History:   TOBACCO:   reports that he has never smoked. He has never used smokeless tobacco.  ETOH:   reports no history of alcohol use. Family History:   No family history on file. Physical Exam:    Vitals: BP (!) 162/100   Pulse 95   Temp 97.2 °F (36.2 °C) (Temporal)   Resp 16   Ht 6' (1.829 m)   Wt 168 lb 3.2 oz (76.3 kg)   SpO2 95%   BMI 22.81 kg/m²     Constitutional - well developed, well nourished.     Eyes - conjunctiva normal.  Pupils react to light  Ear, nose, throat -hearing intact to finger rub No scars, masses, or lesions over external nose or ears, no atrophy of tongue  Neck-symmetric, no masses noted, no jugular vein distension  Respiration- chest wall appears symmetric, good expansion,   normal effort without use of accessory muscles  Cardiovascular- RRR without m,r,g.  Occasional skipped beat which is chronic  Musculoskeletal - no significant wasting of muscles noted, no bony deformities  Extremities-no clubbing, cyanosis or edema  Skin - warm, dry, and intact. No rash, erythema, or pallor. Psychiatric - mood, affect, and behavior appear normal.      Neurological exam  Awake, alert, fluent oriented x 3 appropriate affect  Attention and concentration appear appropriate  Recent and remote memory appears unremarkable  Speech normal without dysarthria  No clear issues with language of fund of knowledge    Cranial Nerve Exam   CN II- Visual fields grossly unremarkable  CN III, IV,VI-EOMI, No nystagmus, conjugate eye movements, no ptosis  CN VII-left facial weakness  CN VIII-Hearing intact   CN IX and X- Palate elevates in midline  CN XI-good shoulder shrug  CN XII-Tongue midline with no fasciculations or fibrillations    Motor Exam  Mild weakness on the left with some clumsiness there. Reflexes   absent throughout  No clonus ankles  No Shell's sign bilateral hands    Tremors- no tremors in hands or head noted    Gait  Not tested    Coordination  Finger to nose-unremarkable        CBC:   Recent Labs     02/19/20  0317 02/19/20  2106 02/20/20  0457   WBC 7.2 6.8 7.8   HGB 14.4 13.8* 14.1    192 181       BMP:    Recent Labs     02/19/20  0317 02/20/20  0457    140   K 4.1 3.8    104   CO2 24 22   BUN 19 15   CREATININE 0.9 0.8   GLUCOSE 95 92       Hepatic: No results for input(s): AST, ALT, ALB, BILITOT, ALKPHOS in the last 72 hours. Lipids: No results for input(s): CHOL, HDL in the last 72 hours. Invalid input(s): LDLCALCU    INR: No results for input(s): INR in the last 72 hours.         Assessment and Plan     1. Right thalamic stroke-PT/OT/SLP. Aspirin and risk factor modification  2. Parkinson's disease-continue Sinemet CR  3. Essential hypertension-continue current medications and monitoring  4. DVT prophylaxis-SCDs  5. GI-bowel regimen      Patient's functional assessment  Prior to hospitalization the patient was continent of bowel and incontinent of bladder    Functional assessment  All notes from reehab data were reviewed regarding the patient's functional status. Current therapy  Requires PT, OT and/or speech therapy    Anticipated discharge approximately 16 days    Anticipated discharge setting  Home with home care    No clear barriers to discharge    The patient appears to be an appropriate candidate for inpatient rehabilitation    Sufficiently stable: Patient's condition is sufficiently stable at the time of admission to allow the patient to actively participate in an intensive rehabilitation program    Close medical supervision: A rehabilitation physician, or other licensed treating physician with specialized training and experience in inpatient rehabilitation, will conduct face-to-face visits with the patient a minimum of at least 3 days per week throughout the patient's stay    This patient requires close medical supervision for the active management of the ongoing conditions and potential complications stated in the admission note    Intensive rehabilitation nursing: The patient demonstrates the need for 24-hour rehabilitation nursing care for active management of the multiple medical issues documented in the admission note    Appropriate therapy needed: The patient requires the active and ongoing therapeutic intervention of at least 2 therapeutic disciplines, one of which must be physical or occupational therapy and/or speech therapy    Intensive therapy:  The patient requires and is reasonably expected to actively participate in at least 3 hours of therapy per day at least 5 days per week, and expected to make measurable improvements that will be of practical value to improve the patient's functional capacity or adaptation to impairments. In addition, therapy treatments will begin within 36 hours from midnight of the day of the patient's admission to the inpatient rehabilitation facility    Expected duration and frequency therapy: 180 minutes per day, 5 days per week    Interdisciplinary team: The patient demonstrates the need for an interdisciplinary team for active management of the following medical issues including ataxia, motor planning, balance, disease management, elimination, endurance, family training, education, independent ADLs, pain management, precautions, range of motion, safety, strength, and transfers    I have reviewed the preadmission screening documents and concur with the findings. I believe the patient meets criteria and is sufficiently stable to allow participation in the program. This requires an intensive level of therapy, close medical supervision, and an interdisciplinary team approach provided through an individualized plan of care. I approve admitting this patient for an intensive inpatient rehabilitation program.      Clair Rendon.  Martin Stanley MD

## 2020-02-20 NOTE — PROGRESS NOTES
observed. At the end of amb, pt's LLE began to show signs of fatigue    Gait Deviations: Slow Neli, Decreased step length, Decreased step height  Distance: 150ft(with turns )  AMBULATION 2     STAIRS       GOALS:  Short term goals  Time Frame for Short term goals: 1wk  Short term goal 1: indep bed mobility   Short term goal 2: SBA transfers  Short term goal 3: SBA wc propulstion 150ft  Short term goal 4: SBA amb 150ft with least restrictive AD  Short term goal 5: SBA 1 stair     Long term goals  Time Frame for Long term goals : 2wks  Long term goal 1: indep transfers  Long term goal 2: indep wc propulsion 150ft  Long term goal 3: indep amb 150ft with least restrictive AD  Long term goal 4: SBA 4 stairs  Long term goal 5: indep amb 50ft on uneven surfaces  HOME LIVING:     Type of Home: House  Home Layout: One level  Home Access: Stairs to enter with rails  Entrance Stairs - Number of Steps: 3  Entrance Stairs - Rails: Right  ASSESSMENT (IMPAIRMENTS/BARRIERS): Body structures, Functions, Activity limitations: Decreased functional mobility , Decreased ADL status, Decreased ROM, Decreased strength, Decreased safe awareness, Decreased endurance, Decreased sensation, Decreased balance, Decreased vision/visual deficit, Decreased high-level IADLs, Decreased fine motor control, Decreased coordination  Assessment: Pt flavia evaluation well. He completes bed mobility SBA and transfers with CGA. Pt is able to propell wc with SBA and can self correct path deviations. Pt was instructed to use a small step reciprocal gait pattern during amb with a fww.  Treatment this afternoon will consist of stair training, car transfers, and general strengthening exercises   Activity Tolerance: Patient Tolerated treatment well, Patient limited by fatigue     PLAN:  Plan  Times per week: 5-6  Times per day: Daily  Plan weeks: 2  Current Treatment Recommendations: Strengthening, ROM, Balance Training, Functional Mobility Training, Transfer Training, ADL/Self-care Training, IADL Training, Endurance Training, Wheelchair Mobility Training, Gait Training, Stair training, Home Exercise Program, Safety Education & Training, Patient/Caregiver Education & Training     PATIENT REQUIRES AND IS REASONABLY EXPECTED TO ACTIVELY PARTICIPATE IN AT LEAST 3 HOURS OF INTENSIVE THERAPY PER DAY AT LEAST 5 DAYS PER WEEK, AND BE EXPECTED TO MAKE MEASURABLE IMPROVEMENT THAT WILL BE OF PRACTICAL VALUE TO IMPROVE THE PATIENT'S FUNCTIONAL CAPACITY OR ADAPTATION TO IMPAIRMENTS.    PATIENT GOAL FOR REHAB:  RETURN TO PRIOR LEVEL OF FUNCTION       IRF/NIKKY  Roll Left and Right  Assistance Needed: Supervision or touching assistance  CARE Score: 4  Discharge Goal: Independent    Sit to Lying  Assistance Needed: Supervision or touching assistance  CARE Score: 4  Discharge Goal: Independent    Lying to Sitting on Side of Bed  Assistance Needed: Supervision or touching assistance  CARE Score: 4  Discharge Goal: Independent    Sit to Stand  Assistance Needed: Supervision or touching assistance  CARE Score: 4  Discharge Goal: Independent    Chair/Bed-to-Chair Transfer  Assistance Needed: Supervision or touching assistance  CARE Score: 4  Discharge Goal: Independent         Walk 10 Feet  Assistance Needed: Supervision or touching assistance  CARE Score: 4  Discharge Goal: Independent    Walk 50 Feet with Two Turns  Assistance Needed: Supervision or touching assistance  CARE Score: 4  Discharge Goal: Independent    Walk 150 Feet  Assistance Needed: Supervision or touching assistance  CARE Score: 4  Discharge Goal: Independent    Walking 10 Feet on Uneven Surfaces  Reason if not Attempted: Not attempted due to medical condition or safety concerns  CARE Score: 88  Discharge Goal: Independent              12 Steps  Reason if not Attempted: Not attempted due to medical condition or safety concerns  CARE Score: 88  Discharge Goal: Not Attempted    Wheel 50 Feet with Two Turns  Assistance Needed:

## 2020-02-20 NOTE — PROGRESS NOTES
Speech Language Pathology  Facility/Department: [facility] 8 REHAB UNIT   CLINICAL BEDSIDE SWALLOW EVALUATION    NAME: Earlene Grajeda  : 1945  MRN: [id number]    ADMISSION DATE: 2020  ADMITTING DIAGNOSIS: has Neuropathy; Benign meningioma ([facility] [address] Memory difficulties; Non-smoker; Normal body mass index (BMI); OAB (overactive bladder); Primary Parkinsonism ([facility] [address] Prostate cancer ([facility] [address] BARBARA (stress urinary incontinence), male; Acute CVA (cerebrovascular accident) ([facility] [address] Patent foramen ovale; and Acute ischemic stroke (HCC) on their problem list.      Date of Eval: 2020  Evaluating Therapist: Marquis Crowe    Current Diet level:  Current Diet : Regular  Current Liquid Diet : Thin      Primary Complaint  Chewing/swallowing    Pain:  Pain Assessment  Pain Assessment: 0-10  Pain Level: 0  Patient's Stated Pain Goal: No pain  Pain Type: Acute pain  Pain Location: Ankle, Back, Shoulder  Pain Orientation: Right, Left  Pain Descriptors: Dull, Aching  Pain Frequency: Intermittent  Pain Onset: On-going  Clinical Progression: Not changed  Functional Pain Assessment: Prevents or interferes some active activities and ADLs  Non-Pharmaceutical Pain Intervention(s): Rest  Response to Pain Intervention: Patient Satisfied  Multiple Pain Sites: No    Reason for Referral  Earlene Grajeda was referred for a bedside swallow evaluation to assess the efficiency of his swallow function, identify signs and symptoms of aspiration and make recommendations regarding safe dietary consistencies, effective compensatory strategies, and safe eating environment. Impression: Patients swallow function assessed with regular solid, and thin liquid consistencies. Patient presenting with mild decreased oral prep and mastication for more solids. Min/no residue noted post swallows. Appropriate anterior to posterior oral transit for both liquids and solids.  Mild decreased/sluggish laryngeal elevation for airway protection however no overt s/s of protection, however no overt s/s of aspiration noted with any regular consistency or thin liquids administered this date.      Education  Patient Education Response: Demonstrated understanding             Therapy Time  SLP Individual Minutes  Time In: 1100  Time Out: 1200  Minutes: 61     SLP Total Treatment Time  Timed Code Treatment Minutes: Kelsi Banks 74, SLP  2/20/2020 3:43 PM

## 2020-02-21 ENCOUNTER — RESULTS ENCOUNTER (OUTPATIENT)
Dept: UROLOGY | Facility: CLINIC | Age: 75
End: 2020-02-21

## 2020-02-21 DIAGNOSIS — C61 PROSTATE CANCER (HCC): ICD-10-CM

## 2020-02-21 PROCEDURE — 77338 DESIGN MLC DEVICE FOR IMRT: CPT | Performed by: RADIOLOGY

## 2020-02-21 PROCEDURE — 97530 THERAPEUTIC ACTIVITIES: CPT

## 2020-02-21 PROCEDURE — 6370000000 HC RX 637 (ALT 250 FOR IP): Performed by: INTERNAL MEDICINE

## 2020-02-21 PROCEDURE — 6370000000 HC RX 637 (ALT 250 FOR IP): Performed by: PSYCHIATRY & NEUROLOGY

## 2020-02-21 PROCEDURE — 97110 THERAPEUTIC EXERCISES: CPT

## 2020-02-21 PROCEDURE — 97535 SELF CARE MNGMENT TRAINING: CPT

## 2020-02-21 PROCEDURE — 97130 THER IVNTJ EA ADDL 15 MIN: CPT

## 2020-02-21 PROCEDURE — 77301 RADIOTHERAPY DOSE PLAN IMRT: CPT | Performed by: RADIOLOGY

## 2020-02-21 PROCEDURE — 2580000003 HC RX 258: Performed by: INTERNAL MEDICINE

## 2020-02-21 PROCEDURE — 97116 GAIT TRAINING THERAPY: CPT

## 2020-02-21 PROCEDURE — 77300 RADIATION THERAPY DOSE PLAN: CPT | Performed by: RADIOLOGY

## 2020-02-21 PROCEDURE — 1180000000 HC REHAB R&B

## 2020-02-21 PROCEDURE — 99232 SBSQ HOSP IP/OBS MODERATE 35: CPT | Performed by: PSYCHIATRY & NEUROLOGY

## 2020-02-21 PROCEDURE — 6360000002 HC RX W HCPCS: Performed by: PSYCHIATRY & NEUROLOGY

## 2020-02-21 PROCEDURE — 97129 THER IVNTJ 1ST 15 MIN: CPT

## 2020-02-21 RX ADMIN — ACETAMINOPHEN 650 MG: 325 TABLET ORAL at 15:07

## 2020-02-21 RX ADMIN — CARBIDOPA AND LEVODOPA 1 TABLET: 50; 200 TABLET, EXTENDED RELEASE ORAL at 21:14

## 2020-02-21 RX ADMIN — LISINOPRIL 40 MG: 20 TABLET ORAL at 08:19

## 2020-02-21 RX ADMIN — CARBIDOPA AND LEVODOPA 1 TABLET: 50; 200 TABLET, EXTENDED RELEASE ORAL at 14:32

## 2020-02-21 RX ADMIN — SODIUM CHLORIDE, PRESERVATIVE FREE 10 ML: 5 INJECTION INTRAVENOUS at 08:29

## 2020-02-21 RX ADMIN — CLOPIDOGREL BISULFATE 75 MG: 75 TABLET ORAL at 08:19

## 2020-02-21 RX ADMIN — ENOXAPARIN SODIUM 40 MG: 40 INJECTION SUBCUTANEOUS at 21:14

## 2020-02-21 RX ADMIN — ACETAMINOPHEN 650 MG: 325 TABLET ORAL at 08:19

## 2020-02-21 RX ADMIN — CARBIDOPA AND LEVODOPA 1 TABLET: 50; 200 TABLET, EXTENDED RELEASE ORAL at 08:19

## 2020-02-21 ASSESSMENT — PAIN - FUNCTIONAL ASSESSMENT
PAIN_FUNCTIONAL_ASSESSMENT: ACTIVITIES ARE NOT PREVENTED

## 2020-02-21 ASSESSMENT — PAIN DESCRIPTION - PAIN TYPE
TYPE: ACUTE PAIN

## 2020-02-21 ASSESSMENT — PAIN DESCRIPTION - LOCATION
LOCATION: WRIST

## 2020-02-21 ASSESSMENT — PAIN DESCRIPTION - ORIENTATION
ORIENTATION: RIGHT

## 2020-02-21 ASSESSMENT — PAIN DESCRIPTION - DESCRIPTORS
DESCRIPTORS: ACHING
DESCRIPTORS: ACHING;SORE
DESCRIPTORS: ACHING
DESCRIPTORS: ACHING;SORE
DESCRIPTORS: ACHING

## 2020-02-21 ASSESSMENT — PAIN DESCRIPTION - PROGRESSION
CLINICAL_PROGRESSION: NOT CHANGED

## 2020-02-21 ASSESSMENT — PAIN DESCRIPTION - FREQUENCY
FREQUENCY: CONTINUOUS

## 2020-02-21 ASSESSMENT — PAIN SCALES - GENERAL
PAINLEVEL_OUTOF10: 4
PAINLEVEL_OUTOF10: 3
PAINLEVEL_OUTOF10: 6
PAINLEVEL_OUTOF10: 8
PAINLEVEL_OUTOF10: 3

## 2020-02-21 ASSESSMENT — PAIN DESCRIPTION - ONSET
ONSET: ON-GOING

## 2020-02-21 NOTE — PROGRESS NOTES
[facility]  INPATIENT SPEECH THERAPY  [facility] 8 REHAB UNIT      TIME   Time In: 1430  Time Out: 1530  Minutes: 60       [x]Daily Note  []Progress Note    Date: 2020  Patient Name: Henry Kessler        MRN: [id number]    Account #: [de-identified]  : 1945  (68 y.o.)  Gender: male   Primary Provider: Vega Escobar MD   Swallowing Status/Diet: Cardiac diet, thin liquids        PATIENT DIAGNOSIS(ES):    Diagnosis: CVA, L WEAKNESS  Additional Pertinent Hx: Parkinson's, Hemiplegia/Hemiparesis, Dysphagia, Polyneuropathy, HTN, depression, R frontal meningioma, Ataxia, stress incontinence, dissiness, visual distrubances    RESTRICTIONS/PRECAUTIONS:    Restrictions/Precautions  Restrictions/Precautions: Fall Risk      Subjective: The patient was upright in his wheelchair. He stated he was fatigued from his previous therapy sessions. He did report 7 out of 10 pain in his right wrist. His family reports numerous falls and complaints of wrist pain. His nurse contacted Dr. Abimael Jeffries and an Xray was ordered. Objective:  He was oriented. Problem solving and safety awareness tasks. Pt did verbally express understanding for using the nurse call lights and was able to locate these. He did have his wheelchair alarm in place. Recall of demographic information was at 100%. [place] divergent naming tasks were completed. He was able to name eight items in one minute and 12 total in two minutes. Abstract divergent naming tasks. He was able to name six items when given one minute. Mental manipulation tasks were completed. Reverse recall of two digits at 100%. Counting in reverse from 20 to 1 did prove difficult. He had some difficulty in recalling his place. A few numbers were counted in unison and he resumed counting to 1. Two step commands with left/right discrimination at 100%.      SHORT TERM GOAL #1:  Goal 1: Patient will utilize compensatory strategies for short-term memory/recall tasks and during ADLs with minimal

## 2020-02-21 NOTE — PROGRESS NOTES
Mobility Training;Neuromuscular Re-education;Equipment Evaluation, Education, & procurement;Home Management Training; Endurance Training;Cognitive Reorientation

## 2020-02-21 NOTE — PROGRESS NOTES
c/o double vision & being lightheaded on 2/18/20. Repeat CT of head showed no changes. Echo done suggested PFO. Cardiology was consulted for ELIZABETH. He was seen by Dr. Emir Talamantes and went for ELIZABETH on 2/19/20. No complaints today. Feeling better overall  REVIEW OF SYSTEMS:  Constitutional: No fevers No chills  Neck:No stiffness  Respiratory: No shortness of breath  Cardiovascular: No chest pain No palpitations  Gastrointestinal: No abdominal pain    Genitourinary: No Dysuria  Neurological: No headache, no confusion      PHYSICAL EXAM:  BP (!) 152/93   Pulse 68   Temp 97 °F (36.1 °C) (Temporal)   Resp 18   Ht 6' (1.829 m)   Wt 168 lb 3.2 oz (76.3 kg)   SpO2 95%   BMI 22.81 kg/m²     Constitutional: he appears well-developed and well-nourished. Eyes - conjunctiva normal.  Pupils react to light  Ear, nose, throat -hearing intact to voice. No scars, masses, or lesions over external nose or ears, no atrophy of tongue  Neck-symmetric, no masses noted, no jugular vein distension  Respiration- chest wall appears symmetric, good expansion,   normal effort without use of accessory muscles  Cardiovascular- RRR  Musculoskeletal - no significant wasting of muscles noted, no bony deformities, gait no gross ataxia  Extremities-no clubbing, cyanosis or edema  Skin - warm, dry, and intact. No rash, erythema, or pallor.   Psychiatric - mood, affect, and behavior appear normal.      Neurology  NEUROLOGICAL EXAM:      Mental status   Awake, alert, fluent oriented x 3 appropriate affect  Attention and concentration appear appropriate  Recent and remote memory appears unremarkable  Speech normal without dysarthria  No clear issues with language       Cranial Nerves   CN II- Visual fields grossly unremarkable  CN III, IV,VI-EOMI, No nystagmus, conjugate eye movements, no ptosis  CN VII-no facial asymmetry  CN VIII-Hearing intact   CN IX and X- Palate elevates in midline  CN XI-good shoulder shrug  CN XII-Tongue midline with no

## 2020-02-21 NOTE — PROGRESS NOTES
Occupational Therapy     02/21/20 0900   General   Diagnosis Right CVA    Pain Assessment   Patient Currently in Pain Yes   Pain Assessment 0-10   Pain Level 4   Pain Type Acute pain   Pain Location Wrist   Pain Orientation Right   Pain Descriptors Aching; Sore   Pain Frequency Continuous   Clinical Progression Not changed   Response to Pain Intervention Patient Satisfied   ADL   Grooming Supervision;Setup   Toileting Contact guard assistance   Balance   Sitting Balance Stand by assistance   Standing Balance Contact guard assistance   Functional Mobility   Functional - Mobility Device Rolling Walker   Activity Other   Assist Level Minimal assistance   Functional Mobility Comments Cues for proper RW placement when turning to sit. Transfers   Sit to stand Minimal assistance   Stand to sit Contact guard assistance  (Cues to control sit.)   Transfer Comments Cues for proper hand placement. Toilet Transfers   Toilet - Technique Stand step   Equipment Used Grab bars   Toilet Transfer Minimal assistance   Type of ROM/Therapeutic Exercise   Type of ROM/Therapeutic Exercise Cane/Wand   Comment 2# 2 sets x 10 reps, cues for larger movements. Assessment   Performance deficits / Impairments Decreased functional mobility ; Decreased ADL status; Decreased balance;Decreased safe awareness;Decreased coordination;Decreased fine motor control;Decreased high-level IADLs;Decreased cognition;Decreased endurance;Decreased ROM; Decreased vision/visual deficit; Decreased strength   Treatment Diagnosis Right CVA, left sided weakness    Prognosis Good   History Parkinson's, neuropathy, hypertension    Timed Code Treatment Minutes 60 Minutes   Activity Tolerance   Activity Tolerance Patient Tolerated treatment well   Safety Devices   Safety Devices in place Yes   Type of devices Gait belt  (Given to PT.)   Plan   Current Treatment Recommendations Strengthening; Wheelchair Mobility Training;ROM;Safety Education & Training;Balance

## 2020-02-22 ENCOUNTER — APPOINTMENT (OUTPATIENT)
Dept: GENERAL RADIOLOGY | Age: 75
DRG: 057 | End: 2020-02-22
Attending: PSYCHIATRY & NEUROLOGY
Payer: MEDICARE

## 2020-02-22 LAB — URINE CULTURE, ROUTINE: NORMAL

## 2020-02-22 PROCEDURE — 97116 GAIT TRAINING THERAPY: CPT

## 2020-02-22 PROCEDURE — 97110 THERAPEUTIC EXERCISES: CPT

## 2020-02-22 PROCEDURE — 97130 THER IVNTJ EA ADDL 15 MIN: CPT

## 2020-02-22 PROCEDURE — 6370000000 HC RX 637 (ALT 250 FOR IP): Performed by: PSYCHIATRY & NEUROLOGY

## 2020-02-22 PROCEDURE — 99232 SBSQ HOSP IP/OBS MODERATE 35: CPT | Performed by: PSYCHIATRY & NEUROLOGY

## 2020-02-22 PROCEDURE — 6370000000 HC RX 637 (ALT 250 FOR IP): Performed by: INTERNAL MEDICINE

## 2020-02-22 PROCEDURE — 97129 THER IVNTJ 1ST 15 MIN: CPT

## 2020-02-22 PROCEDURE — 97535 SELF CARE MNGMENT TRAINING: CPT

## 2020-02-22 PROCEDURE — 6360000002 HC RX W HCPCS: Performed by: PSYCHIATRY & NEUROLOGY

## 2020-02-22 PROCEDURE — 1180000000 HC REHAB R&B

## 2020-02-22 PROCEDURE — 73110 X-RAY EXAM OF WRIST: CPT

## 2020-02-22 RX ADMIN — ACETAMINOPHEN 650 MG: 325 TABLET ORAL at 04:06

## 2020-02-22 RX ADMIN — CARBIDOPA AND LEVODOPA 1 TABLET: 50; 200 TABLET, EXTENDED RELEASE ORAL at 19:58

## 2020-02-22 RX ADMIN — ENOXAPARIN SODIUM 40 MG: 40 INJECTION SUBCUTANEOUS at 19:58

## 2020-02-22 RX ADMIN — CLOPIDOGREL BISULFATE 75 MG: 75 TABLET ORAL at 08:27

## 2020-02-22 RX ADMIN — CLONIDINE HYDROCHLORIDE 0.1 MG: 0.1 TABLET ORAL at 04:06

## 2020-02-22 RX ADMIN — CARBIDOPA AND LEVODOPA 1 TABLET: 50; 200 TABLET, EXTENDED RELEASE ORAL at 13:25

## 2020-02-22 RX ADMIN — CARBIDOPA AND LEVODOPA 1 TABLET: 50; 200 TABLET, EXTENDED RELEASE ORAL at 08:27

## 2020-02-22 RX ADMIN — LISINOPRIL 40 MG: 20 TABLET ORAL at 08:27

## 2020-02-22 ASSESSMENT — PAIN SCALES - GENERAL
PAINLEVEL_OUTOF10: 0
PAINLEVEL_OUTOF10: 6
PAINLEVEL_OUTOF10: 2
PAINLEVEL_OUTOF10: 0
PAINLEVEL_OUTOF10: 0

## 2020-02-22 ASSESSMENT — PAIN DESCRIPTION - FREQUENCY
FREQUENCY: INTERMITTENT
FREQUENCY: INTERMITTENT

## 2020-02-22 ASSESSMENT — PAIN DESCRIPTION - PAIN TYPE
TYPE: CHRONIC PAIN
TYPE: ACUTE PAIN

## 2020-02-22 ASSESSMENT — PAIN DESCRIPTION - PROGRESSION: CLINICAL_PROGRESSION: NOT CHANGED

## 2020-02-22 ASSESSMENT — PAIN DESCRIPTION - LOCATION
LOCATION: WRIST
LOCATION: WRIST

## 2020-02-22 ASSESSMENT — PAIN - FUNCTIONAL ASSESSMENT: PAIN_FUNCTIONAL_ASSESSMENT: ACTIVITIES ARE NOT PREVENTED

## 2020-02-22 ASSESSMENT — PAIN DESCRIPTION - ONSET
ONSET: ON-GOING
ONSET: ON-GOING

## 2020-02-22 ASSESSMENT — PAIN DESCRIPTION - ORIENTATION
ORIENTATION: RIGHT
ORIENTATION: RIGHT

## 2020-02-22 ASSESSMENT — PAIN DESCRIPTION - DESCRIPTORS
DESCRIPTORS: ACHING
DESCRIPTORS: ACHING

## 2020-02-22 NOTE — PROGRESS NOTES
to write important details down with min/mod cues. Patient was able to recall to utilize when SLP asked him later in the session about the doctors appointment. With use of external aid (writing on piece of paper) patient could list all the details. Without using the external aid patient recalling 3/5 details. Will continue treatment to address cognition. SHORT TERM GOAL #1:  Goal 1: Patient will utilize compensatory strategies for short-term memory/recall tasks and during ADLs with minimal cues required to utilize. SHORT TERM GOAL #2:  Goal 2: Patient will complete problem solving/safety awareness tasks during functional ADL's in order to increase functional integration into the environment and decreased assistance from caregivers with 80% accuracy with minimal cues required to complete. SHORT TERM GOAL #3:  Goal 3: The patient will follow multi step commands related to functional living environment with 100% accuracy and min verbal cues in order to increase functional integration into environment. SHORT TERM GOAL #4:  Goal 4: The patient will complete simple/complex naming tasks to improve verbal expression and thought organization for effective communication in all settings with min verbal cues at 80% accuracy. SHORT TERM GOAL #5:  Goal 5: The patient will complete executive function tasks (organizing, planning, self monitoring) with 80% accuracy and min verbal cues during daily living activities to improve safety and awareness in functional living environment.        Comprehension: 4 - Patient understands basic needs 75-90%+ of the time  Expression: 4 - Expresses basic ideas/needs 75-90%+ of the time  Social Interaction: 5 - Patient is appropriate with supervision/cues  Problem Solvin - Patient solves simple/routine tasks 75-90%+   Memory: 3 - Patient remembers 50%-74% of the time    ASSESSMENT:  Assessment: [x]Progressing towards goals          []Not Progressing towards goals    Patient Tolerance of Treatment:   [x]Tolerated well []Tolerated fair []Required rest breaks []Fatigued    Education:  Learner:  [x]Patient          []Significant Other          []Other  Education provided regarding:  [x]Goals and POC   []Diet and swallowing precautions    []Home Exercise Program  []Progress and/or discharge information  Method of Education:  [x]Discussion          [x]Demonstration          []Handout          []Other  Evaluation of Education:   []Verbalized understanding   []Demonstrates without assistance  []Demonstrates with assistance  [x]Needs further instruction     []No evidence of learning                  [x]No family present      Plan: [x]Continue with current plan of care    []Modify current plan of care as follows:    []Discharge patient    Discharge Location:    Services/Supervision Recommended:      [x]Patient continues to require treatment by a licensed therapist to address functional deficits as outlined in the established plan of care.

## 2020-02-22 NOTE — PROGRESS NOTES
Occupational Therapy     02/22/20 0915   Restrictions/Precautions   Restrictions/Precautions Fall Risk   General   Additional Pertinent Hx Parkinson's disease, history of prostate cancer, peripheral neuropathy, hypertension, right frontal meningioma   Diagnosis Right CVA   ADL   Additional Comments see care scores   Balance   Standing Balance Contact guard assistance  (2 posterior LOB )   Standing Balance   Activity ADL acts   Functional Mobility   Functional - Mobility Device Rolling Walker   Activity To/From therapy gym   Assist Level Contact guard assistance   Functional Mobility Comments cues to stay with walker during turns to sit   Toilet Transfers   Toilet - Technique Ambulating   Equipment Used Grab bars   Toilet Transfer Contact guard assistance   Short term goals   Short term goal 3 MET   Assessment   Performance deficits / Impairments Decreased functional mobility ; Decreased ADL status; Decreased ROM; Decreased strength;Decreased safe awareness;Decreased cognition;Decreased endurance;Decreased balance;Decreased vision/visual deficit; Decreased high-level IADLs;Decreased fine motor control;Decreased coordination   Assessment 2 posterior LOB during ADL tasks, cues to remain seated during bathing and distal  LB dressing    Treatment Diagnosis Right CVA, left sided weakness    Timed Code Treatment Minutes 60 Minutes   Activity Tolerance   Activity Tolerance Patient Tolerated treatment well   Safety Devices   Safety Devices in place Yes       02/22/20 0915   Eating   Assistance Needed Independent   CARE Score 6   Oral Hygiene   Assistance Needed Supervision or touching assistance   CARE Score 4   [address] or touching assistance   Comment CGA   CARE Score 4   Shower/Bathe Self   Assistance Needed Supervision or touching assistance   Comment CGA   CARE Score 4   Upper Body Dressing   Assistance Needed Setup or clean-up assistance   CARE Score 5   Lower Body Dressing

## 2020-02-22 NOTE — PROGRESS NOTES
Physical Therapy   Abilio Gomez  [id number]     02/22/20 1424   Pain Assessment   Pain Assessment Faces   Pain Level 2   Patient's Stated Pain Goal No pain   Pain Type Chronic pain   Pain Location Wrist   Pain Orientation Right   Pain Descriptors Aching   Pain Frequency Intermittent   Pain Onset On-going   Non-Pharmaceutical Pain Intervention(s) Rest   Transfers   Sit to Stand Stand by assistance   Stand to sit Stand by assistance   Comment R 90 degree turns with SBA/CGA  x1    Ambulation   Ambulation? Yes   WB Status WBAT   More Ambulation?  Yes   Ambulation 1   Surface level tile   Device Rolling Walker   Other Apparatus Wheelchair follow   Assistance Contact guard assistance   Quality of Gait slight flexed knees/hips   Gait Deviations Slow Neli;Decreased step height;Deviated path   Distance 125'    Comments decreased B foot clearance and heel strike on R    Ambulation 2   Surface - 2 level tile   Device 2 Rolling Walker   Other Apparatus 2 Wheelchair follow   Assistance 2 Contact guard assistance   Quality of Gait 2 slight flexed hips/ knees    Gait Deviations Slow Neli;Decreased step height   Distance 375'    Comments decreased DRAKE and cues to keep body inside AD during R turns   Ambulation 3   Surface - 3 level tile   Device 3 Rolling Walker   Assistance 3 Stand by assistance;Contact guard assistance   Quality of Gait 3 discontinuous steps, body occasionally outside of walker during turns    Gait Deviations Slow Neli;Decreased step height;Shuffles   Comments decreased DRAKE with gait and occasional scissoring but no LOB   Exercises   Hip Flexion 10 BLE   Knee Long Arc Quad 15 BLE    Ankle Pumps 20 BLE    Comments minimal manual resistance    Electronically signed by Josh Dang PTA on 2/22/2020 at 3:04 PM

## 2020-02-22 NOTE — PROGRESS NOTES
Patient:   Connie Villarreal  MR#:    [id number]   Room:    5545/537-65   YOB: 1945  Date of Progress Note: 2/22/2020  Time of Note                           8:42 AM  Consulting Physician:   Leonel Conrad M.D. Attending Physician:  Leonel Conrad MD     CHIEF COMPLAINT: Left-sided weakness, ataxia, dysarthria        Subjective  This 75 y.o. male  with history of Parkinson's disease followed by ,HTN,depression and right frontal meningioma that is followed by Dr. Vickie Buckley as outpatient. He presented to [facility] on 2/16/20 with c/o unsteadiness of gain,left leg weakness and occasional changes in speech that started sometime during the night and continued throughout the day on Sunday. CT of head and CTA head/neck were negative. He was not a candidate for TPA d/t being out of the timeframe. He was admitted to the hospitalist service with consult for neurology. He was seen by neurologist Dr. Nate Easley, who felt he had had a right hemispheric stroke. MRI done confirmed a right thalamic stroke. He was supposed to be taking aspirin at home but did not take it routinely. Dr. Nate Easley placed him back on aspirin 325 mg a day and discontinued Plavix. Patient was evaluated by SPT and found to exhibit slow oral prep of more solid consistencies as well as sluggish, inconsistently mildly decreased laryngeal elevation for swallow airway protection. Patient is currently on a regular diet with thin liquids, medicines to be given in pudding or applesauce with assist for feeding. Patient also exhibited slow processing, delayed auditory comprehension, delayed verbal responses with mild-moderate fragmentations observed where the patient started expressions but then discontinued, and decreased high level reasoning/problem solving. Patient continues to have left sided weakness and had an assisted fall with staff on 2/18/20 bumping his left elbow on the door frame. X-ray of left elbow was negative.  Patient also started having

## 2020-02-22 NOTE — PLAN OF CARE
Problem: Falls - Risk of:  Goal: Will remain free from falls  Description  Will remain free from falls  2/22/2020 1012 by Prosper Pritchard LPN  Outcome: Ongoing  2/21/2020 2312 by Jax Groves RN  Outcome: Ongoing  Goal: Absence of physical injury  Description  Absence of physical injury  2/22/2020 1012 by Prosper Pritchard LPN  Outcome: Ongoing  2/21/2020 2312 by Jax Groves RN  Outcome: Ongoing     Problem: Infection:  Goal: Will remain free from infection  Description  Will remain free from infection  2/22/2020 1012 by Prosper Pritchard LPN  Outcome: Ongoing  2/21/2020 2312 by Jax Groves RN  Outcome: Ongoing     Problem: Safety:  Goal: Free from accidental physical injury  Description  Free from accidental physical injury  2/22/2020 1012 by Prosper Pritchard LPN  Outcome: Ongoing  2/21/2020 2312 by Jax Groves RN  Outcome: Ongoing  Goal: Free from intentional harm  Description  Free from intentional harm  2/22/2020 1012 by Prosper Pritchard LPN  Outcome: Ongoing  2/21/2020 2312 by Jax Groves RN  Outcome: Ongoing  Goal: Ability to demonstrate self-control will improve  Description  Ability to demonstrate self-control will improve  2/22/2020 1012 by Prosper Pritchard LPN  Outcome: Ongoing  2/21/2020 2312 by Jax Groves RN  Outcome: Ongoing  Goal: Ability to implement measures to prevent violent behavior in the future will improve  Description  Ability to implement measures to prevent violent behavior in the future will improve  2/22/2020 1012 by Prosper Pritchard LPN  Outcome: Ongoing  2/21/2020 2312 by Jax Groves RN  Outcome: Ongoing  Goal: Ability to redirect hostility and anger into socially appropriate behaviors will improve  Description  Ability to redirect hostility and anger into socially appropriate behaviors will improve  2/22/2020 1012 by Prosper Pritchard LPN  Outcome: Ongoing  2/21/2020 2312 by Jax Groves RN  Outcome: Ongoing     Problem: Daily Care:  Goal: Daily care needs are techniques to maintain patent airway  2/22/2020 1012 by Willy Lorenzana LPN  Outcome: Ongoing  2/21/2020 2312 by Sol Tee RN  Outcome: Ongoing     Problem: NUTRITION  Goal: Patient maintains adequate hydration  2/22/2020 1012 by Willy Lorenzana LPN  Outcome: Ongoing  2/21/2020 2312 by Sol Tee RN  Outcome: Ongoing     Problem: SAFETY  Goal: LTG - Patient will demonstrate safety requirements appropriate to situation/environment  2/22/2020 1012 by Willy Lorenzana LPN  Outcome: Ongoing  2/21/2020 2312 by Sol Tee RN  Outcome: Ongoing  Goal: LTG - patient will utilize safety techniques  2/22/2020 1012 by Willy Lorenzana LPN  Outcome: Ongoing  2/21/2020 2312 by Sol Tee RN  Outcome: Ongoing  Goal: STG - patient locks brakes on wheelchair  2/22/2020 1012 by Willy Lorenzana LPN  Outcome: Ongoing  2/21/2020 2312 by Sol Tee RN  Outcome: Ongoing  Goal: STG - Patient uses call light consistently to request assistance with transfers  2/22/2020 1012 by Willy Lorenzana LPN  Outcome: Ongoing  2/21/2020 2312 by Sol Tee RN  Outcome: Ongoing  Goal: STG - patient uses gait belt during all transfers  2/22/2020 1012 by Willy Lorenzana LPN  Outcome: Ongoing  2/21/2020 2312 by Sol Tee RN  Outcome: Ongoing     Problem: SKIN INTEGRITY  Goal: LTG - patient will maintain/improve skin integrity through proper skin care techniques  2/22/2020 1012 by Willy Lorenzana LPN  Outcome: Ongoing  2/21/2020 2312 by Sol Tee RN  Outcome: Ongoing  Goal: LTG - Patient will demonstrate appropriate pressure relief techniques  2/22/2020 1012 by Willy Lorenzana LPN  Outcome: Ongoing  2/21/2020 2312 by Sol Tee RN  Outcome: Ongoing  Goal: LTG - patient will demonstrate appropriate skin care techniques  2/22/2020 1012 by Willy Lorenzana LPN  Outcome: Ongoing  2/21/2020 2312 by Sol Tee RN  Outcome: Ongoing  Goal: LTG - Patient will be free from infection  2/22/2020 1012 by Willy Lorenzana LPN  Outcome: Ongoing  2/21/2020 2312 by Mary Anne Randhawa RN  Outcome: Ongoing  Goal: STG - Patient demonstrates skin care/treatment/dressing change  2/22/2020 1012 by Alison Colbert LPN  Outcome: Ongoing  2/21/2020 2312 by Mary Anne Randhawa RN  Outcome: Ongoing  Goal: STG - patient will maintain good skin integrity  2/22/2020 1012 by Alison Colbert LPN  Outcome: Ongoing  2/21/2020 2312 by Mary Anne Randhawa RN  Outcome: Ongoing  Goal: STG - Patient exhibits signs of wound healing.  2/22/2020 1012 by Alison Colbert LPN  Outcome: Ongoing  2/21/2020 2312 by Mary Anne Randhawa RN  Outcome: Ongoing  Goal: STG - patient demonstrates pressure reduction techniques  2/22/2020 1012 by Alison Colbert LPN  Outcome: Ongoing  2/21/2020 2312 by Mary Anne Randhawa RN  Outcome: Ongoing  Goal: STG - Patient demonstrates preventative skin care measures  2/22/2020 1012 by Alison Colbert LPN  Outcome: Ongoing  2/21/2020 2312 by Mary Anne Randhawa RN  Outcome: Ongoing     Problem: PAIN  Goal: STG - Patient will verbalize an acceptable level of pain  2/22/2020 1012 by Alison Colbert LPN  Outcome: Ongoing  2/21/2020 2312 by Mary Anne Randhawa RN  Outcome: Ongoing  Goal: STG - patients pain is managed to allow active participation in daily activities  2/22/2020 1012 by Alison Colbert LPN  Outcome: Ongoing  2/21/2020 2312 by Mary Anne Randhawa RN  Outcome: Ongoing     Problem: Coping:  Goal: Ability to verbalize frustrations and anger appropriately will improve  Description  Ability to verbalize frustrations and anger appropriately will improve  2/22/2020 1012 by Alison Colbert LPN  Outcome: Ongoing  2/21/2020 2312 by Mary Anne Randhawa RN  Outcome: Ongoing     Problem: Health Behavior:  Goal: Ability to verbalize precipitating factors for violent behavior will improve  Description  Ability to verbalize precipitating factors for violent behavior will improve  2/22/2020 1012 by Alison Colbert LPN  Outcome: Ongoing  2/21/2020 2312 by Mary Anne Randhawa RN  Outcome:

## 2020-02-23 PROCEDURE — 6360000002 HC RX W HCPCS: Performed by: PSYCHIATRY & NEUROLOGY

## 2020-02-23 PROCEDURE — 1180000000 HC REHAB R&B

## 2020-02-23 PROCEDURE — 6370000000 HC RX 637 (ALT 250 FOR IP): Performed by: INTERNAL MEDICINE

## 2020-02-23 PROCEDURE — 6370000000 HC RX 637 (ALT 250 FOR IP): Performed by: PSYCHIATRY & NEUROLOGY

## 2020-02-23 RX ADMIN — CARBIDOPA AND LEVODOPA 1 TABLET: 50; 200 TABLET, EXTENDED RELEASE ORAL at 20:12

## 2020-02-23 RX ADMIN — ACETAMINOPHEN 650 MG: 325 TABLET ORAL at 20:16

## 2020-02-23 RX ADMIN — LISINOPRIL 40 MG: 20 TABLET ORAL at 08:07

## 2020-02-23 RX ADMIN — CARBIDOPA AND LEVODOPA 1 TABLET: 50; 200 TABLET, EXTENDED RELEASE ORAL at 08:07

## 2020-02-23 RX ADMIN — ACETAMINOPHEN 650 MG: 325 TABLET ORAL at 08:11

## 2020-02-23 RX ADMIN — ENOXAPARIN SODIUM 40 MG: 40 INJECTION SUBCUTANEOUS at 20:12

## 2020-02-23 RX ADMIN — CLOPIDOGREL BISULFATE 75 MG: 75 TABLET ORAL at 08:06

## 2020-02-23 RX ADMIN — CARBIDOPA AND LEVODOPA 1 TABLET: 50; 200 TABLET, EXTENDED RELEASE ORAL at 13:27

## 2020-02-23 ASSESSMENT — PAIN SCALES - GENERAL
PAINLEVEL_OUTOF10: 3
PAINLEVEL_OUTOF10: 0
PAINLEVEL_OUTOF10: 6
PAINLEVEL_OUTOF10: 0

## 2020-02-23 ASSESSMENT — PAIN DESCRIPTION - ONSET: ONSET: ON-GOING

## 2020-02-23 ASSESSMENT — PAIN DESCRIPTION - FREQUENCY: FREQUENCY: INTERMITTENT

## 2020-02-23 ASSESSMENT — PAIN DESCRIPTION - DESCRIPTORS
DESCRIPTORS: DISCOMFORT
DESCRIPTORS: DISCOMFORT

## 2020-02-23 ASSESSMENT — PAIN DESCRIPTION - LOCATION
LOCATION: GENERALIZED

## 2020-02-23 ASSESSMENT — PAIN DESCRIPTION - ORIENTATION: ORIENTATION: RIGHT

## 2020-02-23 ASSESSMENT — PAIN DESCRIPTION - PROGRESSION
CLINICAL_PROGRESSION: NOT CHANGED
CLINICAL_PROGRESSION: NOT CHANGED

## 2020-02-23 ASSESSMENT — PAIN - FUNCTIONAL ASSESSMENT: PAIN_FUNCTIONAL_ASSESSMENT: ACTIVITIES ARE NOT PREVENTED

## 2020-02-23 ASSESSMENT — PAIN DESCRIPTION - PAIN TYPE: TYPE: CHRONIC PAIN

## 2020-02-23 NOTE — PLAN OF CARE
Problem: Falls - Risk of:  Goal: Will remain free from falls  Description  Will remain free from falls  2/23/2020 0909 by Alison Colbert LPN  Outcome: Ongoing  2/22/2020 2356 by Dominic Singletary RN  Outcome: Ongoing  Goal: Absence of physical injury  Description  Absence of physical injury  2/23/2020 0909 by Alison Colbert LPN  Outcome: Ongoing  2/22/2020 2356 by Dominic Singletary RN  Outcome: Ongoing     Problem: Infection:  Goal: Will remain free from infection  Description  Will remain free from infection  2/23/2020 0909 by Alison Colbert LPN  Outcome: Ongoing  2/22/2020 2356 by Dominic Singletary RN  Outcome: Ongoing     Problem: Safety:  Goal: Free from accidental physical injury  Description  Free from accidental physical injury  2/23/2020 0909 by Alison Colbert LPN  Outcome: Ongoing  2/22/2020 2356 by Dominic Singletary RN  Outcome: Ongoing  Goal: Free from intentional harm  Description  Free from intentional harm  2/23/2020 0909 by Alison Colbert LPN  Outcome: Ongoing  2/22/2020 2356 by Dominic Singletary RN  Outcome: Ongoing  Goal: Ability to demonstrate self-control will improve  Description  Ability to demonstrate self-control will improve  2/23/2020 0909 by Alison Colbert LPN  Outcome: Ongoing  2/22/2020 2356 by Dominic Singletary RN  Outcome: Ongoing  Goal: Ability to implement measures to prevent violent behavior in the future will improve  Description  Ability to implement measures to prevent violent behavior in the future will improve  2/23/2020 0909 by Alison Colbert LPN  Outcome: Ongoing  2/22/2020 2356 by Dominic Singletary RN  Outcome: Ongoing  Goal: Ability to redirect hostility and anger into socially appropriate behaviors will improve  Description  Ability to redirect hostility and anger into socially appropriate behaviors will improve  2/23/2020 0909 by Alison Colbert LPN  Outcome: Ongoing  2/22/2020 2356 by Dominic Singletary RN  Outcome: Ongoing     Problem: Daily Care:  Goal: Daily care needs are met  Description  Daily care needs are met  2/23/2020 0047 by Michael Vora LPN  Outcome: Ongoing  2/22/2020 2356 by Cristofer Nino RN  Outcome: Ongoing     Problem: Pain:  Goal: Patient's pain/discomfort is manageable  Description  Patient's pain/discomfort is manageable  2/23/2020 0909 by Michael Vora LPN  Outcome: Ongoing  2/22/2020 2356 by Cristofer Nino RN  Outcome: Ongoing  Goal: Pain level will decrease  Description  Pain level will decrease  2/23/2020 0909 by Michael Vora LPN  Outcome: Ongoing  2/22/2020 2356 by Cristofer Nino RN  Outcome: Ongoing  Goal: Control of acute pain  Description  Control of acute pain  2/23/2020 0909 by Michael Vora LPN  Outcome: Ongoing  2/22/2020 2356 by Cristofer Nino RN  Outcome: Ongoing  Goal: Control of chronic pain  Description  Control of chronic pain  2/23/2020 0909 by Michael Vora LPN  Outcome: Ongoing  2/22/2020 2356 by Cristofer Nino RN  Outcome: Ongoing     Problem: Skin Integrity:  Goal: Skin integrity will stabilize  Description  Skin integrity will stabilize  2/23/2020 0909 by Michael Vora LPN  Outcome: Ongoing  2/22/2020 2356 by Cristofer Nino RN  Outcome: Ongoing     Problem: Discharge Planning:  Goal: Patients continuum of care needs are met  Description  Patients continuum of care needs are met  2/23/2020 0909 by Michael Vora LPN  Outcome: Ongoing  2/22/2020 2356 by Cristofer Nino RN  Outcome: Ongoing     Problem: IP BLADDER/VOIDING  Goal: LTG - Patient will utilize adaptive techniques/equipment to complete bladder elimination  2/23/2020 0909 by Michael Vora LPN  Outcome: Ongoing  2/22/2020 2356 by Cristofer Nino RN  Outcome: Ongoing     Problem: IP BOWEL ELIMINATION  Goal: LTG - patient will have regular and routine bowel evacuation  2/23/2020 0909 by Michael Vora LPN  Outcome: Ongoing  2/22/2020 2356 by Cristofer Nino RN  Outcome: Ongoing     Problem: IP BREATHING  Goal: LTG - Patient/caregiver will demonstrate/perform proper techniques to maintain patent airway  2/23/2020 0909 by Rolo Mendoza LPN  Outcome: Ongoing  2/22/2020 2356 by Neil Noel RN  Outcome: Ongoing     Problem: NUTRITION  Goal: Patient maintains adequate hydration  2/23/2020 0909 by Rolo Mendoza LPN  Outcome: Ongoing  2/22/2020 2356 by Neil Noel RN  Outcome: Ongoing     Problem: SAFETY  Goal: LTG - Patient will demonstrate safety requirements appropriate to situation/environment  2/23/2020 0909 by Rolo Mendoza LPN  Outcome: Ongoing  2/22/2020 2356 by Neil Noel RN  Outcome: Ongoing  Goal: LTG - patient will utilize safety techniques  2/23/2020 0909 by Rolo Mendoza LPN  Outcome: Ongoing  2/22/2020 2356 by Neil Noel RN  Outcome: Ongoing  Goal: STG - patient locks brakes on wheelchair  2/23/2020 0909 by Rolo Mendoza LPN  Outcome: Ongoing  2/22/2020 2356 by Neil Noel RN  Outcome: Ongoing  Goal: STG - Patient uses call light consistently to request assistance with transfers  2/23/2020 0909 by Rolo Mendoza LPN  Outcome: Ongoing  2/22/2020 2356 by Neil Noel RN  Outcome: Ongoing  Goal: STG - patient uses gait belt during all transfers  2/23/2020 0909 by Rolo Mendoza LPN  Outcome: Ongoing  2/22/2020 2356 by Neil Noel RN  Outcome: Ongoing     Problem: SKIN INTEGRITY  Goal: LTG - patient will maintain/improve skin integrity through proper skin care techniques  2/23/2020 0909 by Rolo Mendoza LPN  Outcome: Ongoing  2/22/2020 2356 by Neil Noel RN  Outcome: Ongoing  Goal: LTG - Patient will demonstrate appropriate pressure relief techniques  2/23/2020 0909 by Rolo Mendoza LPN  Outcome: Ongoing  2/22/2020 2356 by Neil Noel RN  Outcome: Ongoing  Goal: LTG - patient will demonstrate appropriate skin care techniques  2/23/2020 0909 by Rolo Mendoza LPN  Outcome: Ongoing  2/22/2020 2356 by Neil Noel RN  Outcome: Ongoing  Goal: LTG - Patient will be free from infection  2/23/2020 0909 by Ana Amaya LPN  Outcome: Ongoing  2/22/2020 2356 by Prema Najera RN  Outcome: Ongoing  Goal: STG - Patient demonstrates skin care/treatment/dressing change  2/23/2020 0909 by Ana Amaya LPN  Outcome: Ongoing  2/22/2020 2356 by Prema Najera RN  Outcome: Ongoing  Goal: STG - patient will maintain good skin integrity  2/23/2020 0909 by Ana Amaya LPN  Outcome: Ongoing  2/22/2020 2356 by Prema Najera RN  Outcome: Ongoing  Goal: STG - Patient exhibits signs of wound healing.  2/23/2020 0909 by Ana Amaya LPN  Outcome: Ongoing  2/22/2020 2356 by Prema Najera RN  Outcome: Ongoing  Goal: STG - patient demonstrates pressure reduction techniques  2/23/2020 0909 by Ana Amaya LPN  Outcome: Ongoing  2/22/2020 2356 by Prema Najera RN  Outcome: Ongoing  Goal: STG - Patient demonstrates preventative skin care measures  2/23/2020 0909 by Ana Amaya LPN  Outcome: Ongoing  2/22/2020 2356 by Prema Najera RN  Outcome: Ongoing     Problem: PAIN  Goal: STG - Patient will verbalize an acceptable level of pain  2/23/2020 0909 by Ana Amaya LPN  Outcome: Ongoing  2/22/2020 2356 by Prema Najera RN  Outcome: Ongoing  Goal: STG - patients pain is managed to allow active participation in daily activities  2/23/2020 0909 by Ana Amaya LPN  Outcome: Ongoing  2/22/2020 2356 by Prema Najera RN  Outcome: Ongoing     Problem: Coping:  Goal: Ability to verbalize frustrations and anger appropriately will improve  Description  Ability to verbalize frustrations and anger appropriately will improve  2/23/2020 0909 by Ana Amaya LPN  Outcome: Ongoing  2/22/2020 2356 by Prema Najera RN  Outcome: Ongoing     Problem: Health Behavior:  Goal: Ability to verbalize precipitating factors for violent behavior will improve  Description  Ability to verbalize precipitating factors for violent behavior will improve  2/23/2020

## 2020-02-24 LAB
ANION GAP SERPL CALCULATED.3IONS-SCNC: 13 MMOL/L (ref 7–19)
BASOPHILS ABSOLUTE: 0.1 K/UL (ref 0–0.2)
BASOPHILS RELATIVE PERCENT: 1.3 % (ref 0–1)
BUN BLDV-MCNC: 13 MG/DL (ref 8–23)
CALCIUM SERPL-MCNC: 9 MG/DL (ref 8.8–10.2)
CHLORIDE BLD-SCNC: 103 MMOL/L (ref 98–111)
CO2: 25 MMOL/L (ref 22–29)
CREAT SERPL-MCNC: 0.8 MG/DL (ref 0.5–1.2)
EOSINOPHILS ABSOLUTE: 0.2 K/UL (ref 0–0.6)
EOSINOPHILS RELATIVE PERCENT: 4.4 % (ref 0–5)
GFR NON-AFRICAN AMERICAN: >60
GLUCOSE BLD-MCNC: 85 MG/DL (ref 74–109)
HCT VFR BLD CALC: 44.5 % (ref 42–52)
HEMOGLOBIN: 14.3 G/DL (ref 14–18)
IMMATURE GRANULOCYTES #: 0 K/UL
LYMPHOCYTES ABSOLUTE: 1.3 K/UL (ref 1.1–4.5)
LYMPHOCYTES RELATIVE PERCENT: 23 % (ref 20–40)
MCH RBC QN AUTO: 28 PG (ref 27–31)
MCHC RBC AUTO-ENTMCNC: 32.1 G/DL (ref 33–37)
MCV RBC AUTO: 87.3 FL (ref 80–94)
MONOCYTES ABSOLUTE: 0.6 K/UL (ref 0–0.9)
MONOCYTES RELATIVE PERCENT: 11.7 % (ref 0–10)
NEUTROPHILS ABSOLUTE: 3.3 K/UL (ref 1.5–7.5)
NEUTROPHILS RELATIVE PERCENT: 59.2 % (ref 50–65)
PDW BLD-RTO: 13.7 % (ref 11.5–14.5)
PLATELET # BLD: 194 K/UL (ref 130–400)
PMV BLD AUTO: 10.6 FL (ref 9.4–12.4)
POTASSIUM REFLEX MAGNESIUM: 3.8 MMOL/L (ref 3.5–5)
RBC # BLD: 5.1 M/UL (ref 4.7–6.1)
SODIUM BLD-SCNC: 141 MMOL/L (ref 136–145)
WBC # BLD: 5.5 K/UL (ref 4.8–10.8)

## 2020-02-24 PROCEDURE — 97110 THERAPEUTIC EXERCISES: CPT

## 2020-02-24 PROCEDURE — 1180000000 HC REHAB R&B

## 2020-02-24 PROCEDURE — 85025 COMPLETE CBC W/AUTO DIFF WBC: CPT

## 2020-02-24 PROCEDURE — 97116 GAIT TRAINING THERAPY: CPT

## 2020-02-24 PROCEDURE — 6370000000 HC RX 637 (ALT 250 FOR IP): Performed by: INTERNAL MEDICINE

## 2020-02-24 PROCEDURE — 6370000000 HC RX 637 (ALT 250 FOR IP): Performed by: PSYCHIATRY & NEUROLOGY

## 2020-02-24 PROCEDURE — 97535 SELF CARE MNGMENT TRAINING: CPT

## 2020-02-24 PROCEDURE — 36415 COLL VENOUS BLD VENIPUNCTURE: CPT

## 2020-02-24 PROCEDURE — 80048 BASIC METABOLIC PNL TOTAL CA: CPT

## 2020-02-24 PROCEDURE — 97130 THER IVNTJ EA ADDL 15 MIN: CPT

## 2020-02-24 PROCEDURE — 97530 THERAPEUTIC ACTIVITIES: CPT

## 2020-02-24 PROCEDURE — 97129 THER IVNTJ 1ST 15 MIN: CPT

## 2020-02-24 PROCEDURE — 99232 SBSQ HOSP IP/OBS MODERATE 35: CPT | Performed by: PSYCHIATRY & NEUROLOGY

## 2020-02-24 PROCEDURE — 6360000002 HC RX W HCPCS: Performed by: PSYCHIATRY & NEUROLOGY

## 2020-02-24 RX ORDER — SCOLOPAMINE TRANSDERMAL SYSTEM 1 MG/1
1 PATCH, EXTENDED RELEASE TRANSDERMAL
Status: DISCONTINUED | OUTPATIENT
Start: 2020-02-24 | End: 2020-02-25

## 2020-02-24 RX ORDER — CARVEDILOL 3.12 MG/1
3.12 TABLET ORAL 2 TIMES DAILY WITH MEALS
Status: DISCONTINUED | OUTPATIENT
Start: 2020-02-24 | End: 2020-03-01

## 2020-02-24 RX ADMIN — CARBIDOPA AND LEVODOPA 1 TABLET: 50; 200 TABLET, EXTENDED RELEASE ORAL at 07:52

## 2020-02-24 RX ADMIN — CARVEDILOL 3.12 MG: 3.12 TABLET, FILM COATED ORAL at 18:04

## 2020-02-24 RX ADMIN — ENOXAPARIN SODIUM 40 MG: 40 INJECTION SUBCUTANEOUS at 21:42

## 2020-02-24 RX ADMIN — LISINOPRIL 40 MG: 20 TABLET ORAL at 07:52

## 2020-02-24 RX ADMIN — CARBIDOPA AND LEVODOPA 1 TABLET: 50; 200 TABLET, EXTENDED RELEASE ORAL at 21:42

## 2020-02-24 RX ADMIN — CARBIDOPA AND LEVODOPA 1 TABLET: 50; 200 TABLET, EXTENDED RELEASE ORAL at 13:56

## 2020-02-24 RX ADMIN — CARVEDILOL 3.12 MG: 3.12 TABLET, FILM COATED ORAL at 08:26

## 2020-02-24 RX ADMIN — CLOPIDOGREL BISULFATE 75 MG: 75 TABLET ORAL at 07:52

## 2020-02-24 ASSESSMENT — PAIN DESCRIPTION - PROGRESSION: CLINICAL_PROGRESSION: NOT CHANGED

## 2020-02-24 ASSESSMENT — PAIN SCALES - GENERAL
PAINLEVEL_OUTOF10: 0
PAINLEVEL_OUTOF10: 0

## 2020-02-24 NOTE — PROGRESS NOTES
ADL;Cognitive/Perceptual Training;Functional Mobility Training;Neuromuscular Re-education;Equipment Evaluation, Education, & procurement;Home Management Training; Endurance Training;Cognitive Reorientation

## 2020-02-24 NOTE — PROGRESS NOTES
c/o double vision & being lightheaded on 2/18/20. Repeat CT of head showed no changes. Echo done suggested PFO. Cardiology was consulted for ELIZABETH. He was seen by Dr. Nima Estrella and went for ELIZABETH on 2/19/20. Complains of feeling swimmy headed at times. Blood pressure high at times  REVIEW OF SYSTEMS:  Constitutional: No fevers No chills  Neck:No stiffness  Respiratory: No shortness of breath  Cardiovascular: No chest pain No palpitations  Gastrointestinal: No abdominal pain    Genitourinary: No Dysuria  Neurological: No headache, no confusion      PHYSICAL EXAM:  BP (!) 163/88   Pulse 68   Temp 97.3 °F (36.3 °C) (Temporal)   Resp 16   Ht 6' (1.829 m)   Wt 168 lb (76.2 kg)   SpO2 95%   BMI 22.78 kg/m²     Constitutional: he appears well-developed and well-nourished. Eyes - conjunctiva normal.  Pupils react to light  Ear, nose, throat -hearing intact to voice. No scars, masses, or lesions over external nose or ears, no atrophy of tongue  Neck-symmetric, no masses noted, no jugular vein distension  Respiration- chest wall appears symmetric, good expansion,   normal effort without use of accessory muscles  Cardiovascular- RRR  Musculoskeletal - no significant wasting of muscles noted, no bony deformities, gait no gross ataxia  Extremities-no clubbing, cyanosis or edema  Skin - warm, dry, and intact. No rash, erythema, or pallor.   Psychiatric - mood, affect, and behavior appear normal.      Neurology  NEUROLOGICAL EXAM:      Mental status   Awake, alert, fluent oriented x 3 appropriate affect  Attention and concentration appear appropriate  Recent and remote memory appears unremarkable  Speech normal without dysarthria  No clear issues with language       Cranial Nerves   CN II- Visual fields grossly unremarkable  CN III, IV,VI-EOMI, No nystagmus, conjugate eye movements, no ptosis  CN VII-no facial asymmetry  CN VIII-Hearing intact   CN IX and X- Palate elevates in midline  CN XI-good shoulder shrug  CN XII-Tongue midline with no fasciculations or fibrillations          Motor function  Antigravity x 4     Sensory function Intact to light touch     Cerebellar F-N intact     Tremor None present     Gait                  Not tested           Nursing/pcp notes, imaging,labs and vitals reviewed. PT,OT and/or speech notes reviewed    Lab Results   Component Value Date    WBC 5.5 02/24/2020    HGB 14.3 02/24/2020    HCT 44.5 02/24/2020    MCV 87.3 02/24/2020     02/24/2020     Lab Results   Component Value Date     02/24/2020    K 3.8 02/24/2020     02/24/2020    CO2 25 02/24/2020    BUN 13 02/24/2020    CREATININE 0.8 02/24/2020    GLUCOSE 85 02/24/2020    CALCIUM 9.0 02/24/2020    PROT 7.7 02/16/2020    LABALBU 4.4 02/16/2020    BILITOT 0.4 02/16/2020    ALKPHOS 88 02/16/2020    AST 13 02/16/2020    ALT <5 (A) 02/16/2020    LABGLOM >60 02/24/2020     Lab Results   Component Value Date    INR 1.06 02/16/2020   No results found for: PHENYTOIN, ESR, CRP      02/22/20 0915   Restrictions/Precautions   Restrictions/Precautions Fall Risk   General   Additional Pertinent Hx Parkinson's disease, history of prostate cancer, peripheral neuropathy, hypertension, right frontal meningioma   Diagnosis Right CVA   ADL   Additional Comments see care scores   Balance   Standing Balance Contact guard assistance  (2 posterior LOB )   Standing Balance   Activity ADL acts   Functional Mobility   Functional - Mobility Device Rolling Walker   Activity To/From therapy gym   Assist Level Contact guard assistance   Functional Mobility Comments cues to stay with walker during turns to sit   Toilet Transfers   Toilet - Technique Ambulating   Equipment Used Grab bars   Toilet Transfer Contact guard assistance   Short term goals   Short term goal 3 MET   Assessment   Performance deficits / Impairments Decreased functional mobility ; Decreased ADL status; Decreased ROM; Decreased strength;Decreased safe awareness;Decreased times requires extended time to respond. Decreased initiaition also noted during more functional tasks.      Immediate recall task was completed. SLP educated each time on using compensatory strategy of repetition. Patient understanding, but did require cues to use the strategy with each task. When he did use it, it proved to be effective and patient completed task with 100% accuracy. Patient had to recall where items were placed immediately after they were removed. SLP encouraged patient to use repetition for staff members names.      Compensatory strategy of writing details down was utilized as well during treatment. Patient was given  to recall with time, what to bring and location. Patient was able to write important details down with min/mod cues. Patient was able to recall to utilize when SLP asked him later in the session about the doctors appointment. With use of external aid (writing on piece of paper) patient could list all the details. Without using the external aid patient recalling 3/5 details.        RECORD REVIEW: Previous medical records, medications were reviewed at today's visit    IMPRESSION:   1. Right thalamic stroke-PT/OT/SLP. Aspirin and risk factor modification  2. Parkinson's disease-continue Sinemet CR  3. Essential hypertension- Coreg resumed. 4. DVT prophylaxis-SCDs  5.   GI-bowel regimen  Scopolamine patch added for dizziness    staff this week

## 2020-02-24 NOTE — PROGRESS NOTES
02/24/20 1100   Bed Mobility   Rolling Stand by assistance   Supine to Sit Stand by assistance   Sit to Supine Stand by assistance   Transfers   Sit to Stand Stand by assistance   Stand to sit Stand by assistance   Bed to Chair Stand by assistance   Ambulation 1   Device 500 [facility] guard assistance;Stand by assistance   Quality of Gait SLIGHT FLEXED FORWARD POSTURE.  TOE CATCH X3 WITH SLIGHT STUMBLE WHICH PATIENT CORRECTED. SLIGTH IMPROVEMENT WITH CUEING. Distance 500 FT   Ambulation 2   Device 2 Rollator   Assistance 2 Contact guard assistance   Quality of Gait 2 INITIALLY EXCESSIVE SPEED, OUTSTRETCHED UES, SEVERAL TOE CATCHES. IMPROVED QUALITY WHEN CUED FOR ERECT POSTURE, A.D. Myrna Chatterjeear, HEEL-TOE. Distance 250 FT   Comments ASKED TO BRING IN TENNIS SHOES RATHER THAN HOUSE SHOUES. Stairs   # Steps  8   Rails Bilateral   Assistance Minimal assistance;Contact guard assistance   Comment INITIALLY BEGAN RECIPROCAL ASCENSION/DESCENSION. NO PROBLEMS ASCENDING. LEFT KNEE BUCKLE DURING ATTEMPTED DESCENSION WITH RIGHT LE. CUED FOR PROPER STEP TO UP WITH RIGHT, DOWN WITH LEFT.    Other exercises   Other exercises 1 SIDESTEPPING/BACKWARDS WALKING 25 FT EA X2

## 2020-02-24 NOTE — PATIENT CARE CONFERENCE
[facility] ACUTE INPATIENT REHABILITATION  TEAM CONFERENCE NOTE    Date: 2020  Patient Name: Adalid Hung        MRN: [id number]    : 1945  (75 y.o.)  Gender: male      Diagnosis: CVA, L WEAKNESS      PHYSICAL THERAPY  STRENGTH  Strength RLE  Comment: GROSSLY 4/5  Strength LLE  Comment: GROSSLY 4/5  ROM  AROM RLE (degrees)  RLE AROM: WFL  AROM LLE (degrees)  LLE AROM : WFL  BED MOBILITY  Bed Mobility  Rolling: Stand by assistance  Supine to Sit: Contact guard assistance  Sit to Supine: Stand by assistance  Scooting: Stand by assistance  Comment: Worked on bedside sitting, patient leaning backwards upon initial sit, cues to scoot out to edge of bed  TRANSFERS  Transfers  Sit to Stand: Minimal Assistance  Stand to sit: Minimal Assistance  Bed to Chair: Minimal assistance, Moderate assistance  Stand Pivot Transfers: Minimal Assistance, Moderate Assistance  Car Transfer: Contact guard assistance  Comment: Patient seems to have trouble processing instructions today. Difficulty figuring out which way to turn when transferring bed to wheelchair. WHEELCHAIR PROPULSION  Propulsion 1  Propulsion: Manual  Level: Level Tile  Method: BI, JENNIFER, RLE, LLE  Level of Assistance: Contact guard assistance  Description/ Details: Pt can self correct path deviations, adequate velocity   Distance: 200ft(with turns )  AMBULATION  Ambulation 1  Surface: level tile  Device: Rolling Walker  Other Apparatus: Wheelchair follow  Assistance: Minimal assistance, 2 Person assistance(assist x 2 for safety due to patient's decline today)  Quality of Gait: Patient leaning slightly to the right, decreased step length and foot clearance with left LE. Patient with narrow DRAKE, close to scissoring, but never actually did, but patient is not aligned in middle of the walker, hugging the left side of the walker.    Gait Deviations: Slow Neli, Decreased step length, Decreased step height, Deviated path  Distance: 200 feet  Comments: support, Cooperative and Pleasant    Barriers to the achievement of predicted outcomes: Cognitive deficit, Impulsivity, Limited safety awareness, Decreased endurance, Decreased sensation, Decreased proprioception and Upper extremity weakness    Team Members Present at Conference:  : Nicolasa Dumont   Occupational Therapist: Luis Stephenson OTR/L  Physical Therapist: Abigail Mcrae PT,DPT  Speech Therapist: Rey Mercado  Nurse: Gillian Anderson RN,BSN   Nurse Manager:  Gillian Anderson RN, BSN  Dietitian:  Vanessa Connelly MS, RD, LD  Rehab Director:  Kevin Kelly approve the established interdisciplinary plan of care as documented within the medical record of Abilio Gomez.

## 2020-02-24 NOTE — PROGRESS NOTES
Occupational Therapy     02/24/20 1300   General   Diagnosis Right CVA   Subjective   Subjective Pt. reported dizziness, BP taken. Pain Assessment   Patient Currently in Pain No   Pain Assessment 0-10   Pain Level 0   Vital Signs   /75   BP Location Left Arm   Patient Position Sitting   Light Housekeeping   Light Housekeeping Level Other   Light Housekeeping Level of Assistance Minimal assistance   Light Housekeeping Simple laundry task, required some assistance d/t decreased FM skills and visual deficits. Balance   Sitting Balance Stand by assistance   Standing Balance Contact guard assistance   Functional Mobility   Functional - Mobility Device Rolling Walker   Activity Other   Assist Level Minimal assistance   Functional Mobility Comments Short distance x2 trials, pt. had 3 LOB, Min A to correct. Demonstrated difficulty maneuvering rollator when turning to sit, max cues for locking/unlocking rollator brakes. Recommended transfers and functional mobility with RW when assisted to bathroom with nursing until pt. has further practice with rollator. Transfers   Sit to stand Minimal assistance   Stand to sit Minimal assistance   Transfer Comments Bed, w/c, couch. Required increased assistance this tx session d/t fatigue and dizziness. Assessment   Performance deficits / Impairments Decreased functional mobility ; Decreased ADL status; Decreased ROM; Decreased strength;Decreased safe awareness;Decreased cognition;Decreased endurance;Decreased balance;Decreased vision/visual deficit; Decreased high-level IADLs;Decreased fine motor control;Decreased coordination   Treatment Diagnosis Right CVA, left sided weakness    Prognosis Good   History Parkinson's, neuropathy, hypertension    Timed Code Treatment Minutes 45 Minutes   Activity Tolerance   Activity Tolerance Patient limited by fatigue;Treatment limited secondary to medical complications (free text)   Activity Tolerance Pt. c/o dizziness.    Safety Devices Safety Devices in place Yes   Type of devices Call light within reach; Bed alarm in place   Plan   Current Treatment Recommendations Strengthening; Wheelchair Mobility Training;ROM;Safety Education & Training;Balance Training;Patient/Caregiver Education & Training;Self-Care / ADL;Cognitive/Perceptual Training;Functional Mobility Training;Neuromuscular Re-education;Equipment Evaluation, Education, & procurement;Home Management Training; Endurance Training;Cognitive Reorientation

## 2020-02-25 ENCOUNTER — APPOINTMENT (OUTPATIENT)
Dept: CT IMAGING | Age: 75
DRG: 057 | End: 2020-02-25
Attending: PSYCHIATRY & NEUROLOGY
Payer: MEDICARE

## 2020-02-25 PROCEDURE — 1180000000 HC REHAB R&B

## 2020-02-25 PROCEDURE — 70450 CT HEAD/BRAIN W/O DYE: CPT

## 2020-02-25 PROCEDURE — 97110 THERAPEUTIC EXERCISES: CPT

## 2020-02-25 PROCEDURE — 99232 SBSQ HOSP IP/OBS MODERATE 35: CPT | Performed by: PSYCHIATRY & NEUROLOGY

## 2020-02-25 PROCEDURE — 6370000000 HC RX 637 (ALT 250 FOR IP): Performed by: INTERNAL MEDICINE

## 2020-02-25 PROCEDURE — 92526 ORAL FUNCTION THERAPY: CPT

## 2020-02-25 PROCEDURE — 97535 SELF CARE MNGMENT TRAINING: CPT

## 2020-02-25 PROCEDURE — 6360000002 HC RX W HCPCS: Performed by: PSYCHIATRY & NEUROLOGY

## 2020-02-25 PROCEDURE — 97130 THER IVNTJ EA ADDL 15 MIN: CPT

## 2020-02-25 PROCEDURE — 97530 THERAPEUTIC ACTIVITIES: CPT

## 2020-02-25 PROCEDURE — 6370000000 HC RX 637 (ALT 250 FOR IP): Performed by: PSYCHIATRY & NEUROLOGY

## 2020-02-25 PROCEDURE — 97116 GAIT TRAINING THERAPY: CPT

## 2020-02-25 PROCEDURE — 97129 THER IVNTJ 1ST 15 MIN: CPT

## 2020-02-25 RX ADMIN — ENOXAPARIN SODIUM 40 MG: 40 INJECTION SUBCUTANEOUS at 21:03

## 2020-02-25 RX ADMIN — CARVEDILOL 3.12 MG: 3.12 TABLET, FILM COATED ORAL at 17:16

## 2020-02-25 RX ADMIN — CARVEDILOL 3.12 MG: 3.12 TABLET, FILM COATED ORAL at 09:44

## 2020-02-25 RX ADMIN — ACETAMINOPHEN 650 MG: 325 TABLET ORAL at 21:02

## 2020-02-25 RX ADMIN — CARBIDOPA AND LEVODOPA 1 TABLET: 50; 200 TABLET, EXTENDED RELEASE ORAL at 21:03

## 2020-02-25 RX ADMIN — CARBIDOPA AND LEVODOPA 1 TABLET: 50; 200 TABLET, EXTENDED RELEASE ORAL at 14:00

## 2020-02-25 RX ADMIN — CARBIDOPA AND LEVODOPA 1 TABLET: 50; 200 TABLET, EXTENDED RELEASE ORAL at 09:44

## 2020-02-25 RX ADMIN — POLYETHYLENE GLYCOL 3350 17 G: 17 POWDER, FOR SOLUTION ORAL at 09:45

## 2020-02-25 RX ADMIN — ACETAMINOPHEN 650 MG: 325 TABLET ORAL at 00:10

## 2020-02-25 RX ADMIN — CLOPIDOGREL BISULFATE 75 MG: 75 TABLET ORAL at 09:44

## 2020-02-25 RX ADMIN — LISINOPRIL 40 MG: 20 TABLET ORAL at 09:44

## 2020-02-25 ASSESSMENT — PAIN DESCRIPTION - ONSET
ONSET: UNABLE TO TELL
ONSET: GRADUAL
ONSET: ON-GOING

## 2020-02-25 ASSESSMENT — PAIN DESCRIPTION - LOCATION
LOCATION: BACK
LOCATION: BACK
LOCATION: GENERALIZED

## 2020-02-25 ASSESSMENT — PAIN SCALES - GENERAL
PAINLEVEL_OUTOF10: 3
PAINLEVEL_OUTOF10: 5
PAINLEVEL_OUTOF10: 0
PAINLEVEL_OUTOF10: 4
PAINLEVEL_OUTOF10: 0

## 2020-02-25 ASSESSMENT — PAIN DESCRIPTION - PAIN TYPE
TYPE: CHRONIC PAIN

## 2020-02-25 ASSESSMENT — PAIN DESCRIPTION - FREQUENCY
FREQUENCY: INTERMITTENT

## 2020-02-25 ASSESSMENT — PAIN DESCRIPTION - DESCRIPTORS
DESCRIPTORS: DISCOMFORT
DESCRIPTORS: PATIENT UNABLE TO DESCRIBE
DESCRIPTORS: ACHING

## 2020-02-25 ASSESSMENT — PAIN - FUNCTIONAL ASSESSMENT
PAIN_FUNCTIONAL_ASSESSMENT: ACTIVITIES ARE NOT PREVENTED
PAIN_FUNCTIONAL_ASSESSMENT: ACTIVITIES ARE NOT PREVENTED

## 2020-02-25 ASSESSMENT — PAIN DESCRIPTION - PROGRESSION
CLINICAL_PROGRESSION: NOT CHANGED
CLINICAL_PROGRESSION: GRADUALLY WORSENING
CLINICAL_PROGRESSION: NOT CHANGED
CLINICAL_PROGRESSION: GRADUALLY IMPROVING

## 2020-02-25 ASSESSMENT — PAIN DESCRIPTION - ORIENTATION
ORIENTATION: RIGHT
ORIENTATION: RIGHT

## 2020-02-25 NOTE — PROGRESS NOTES
in place;Nurse notified   Plan   Current Treatment Recommendations Strengthening; Wheelchair Mobility Training;ROM;Safety Education & Training;Balance Training;Patient/Caregiver Education & Training;Self-Care / ADL;Cognitive/Perceptual Training;Functional Mobility Training;Neuromuscular Re-education;Equipment Evaluation, Education, & procurement;Home Management Training; Endurance Training;Cognitive Reorientation      02/25/20 1000   Cognition   Following Commands Follows one step commands with increased time; Follows one step commands with repetition; Inconsistently follows commands   Attention Span Difficulty attending to directions   Initiation Requires cues for all   Sequencing Requires cues for all

## 2020-02-25 NOTE — PROGRESS NOTES
Patient fell and was trying to catch but was unable to catch him before fall. Bed alarm was on and family did not stay last PM.  Dr. Connie Stewart, [facility] all notified. Also sent text to Pharmacy to notify them. Patient has abrasion to right knee and very unsteady in ambulation.

## 2020-02-25 NOTE — PROGRESS NOTES
Patient:   Gregor Bill  MR#:    [id number]   Room:    3090/590-60   YOB: 1945  Date of Progress Note: 2/25/2020  Time of Note                           8:24 AM  Consulting Physician:   Shakir Lima M.D. Attending Physician:  Shakir Lima MD     CHIEF COMPLAINT: Left-sided weakness, ataxia, dysarthria        Subjective  This 75 y.o. male  with history of Parkinson's disease followed by ,HTN,depression and right frontal meningioma that is followed by Dr. Yonathan Bowman as outpatient. He presented to [facility] on 2/16/20 with c/o unsteadiness of gain,left leg weakness and occasional changes in speech that started sometime during the night and continued throughout the day on Sunday. CT of head and CTA head/neck were negative. He was not a candidate for TPA d/t being out of the timeframe. He was admitted to the hospitalist service with consult for neurology. He was seen by neurologist Dr. Yusef Townsend, who felt he had had a right hemispheric stroke. MRI done confirmed a right thalamic stroke. He was supposed to be taking aspirin at home but did not take it routinely. Dr. Yusef Townsend placed him back on aspirin 325 mg a day and discontinued Plavix. Patient was evaluated by SPT and found to exhibit slow oral prep of more solid consistencies as well as sluggish, inconsistently mildly decreased laryngeal elevation for swallow airway protection. Patient is currently on a regular diet with thin liquids, medicines to be given in pudding or applesauce with assist for feeding. Patient also exhibited slow processing, delayed auditory comprehension, delayed verbal responses with mild-moderate fragmentations observed where the patient started expressions but then discontinued, and decreased high level reasoning/problem solving. Patient continues to have left sided weakness and had an assisted fall with staff on 2/18/20 bumping his left elbow on the door frame. X-ray of left elbow was negative.  Patient also started having c/o double vision & being lightheaded on 2/18/20. Repeat CT of head showed no changes. Echo done suggested PFO. Cardiology was consulted for ELIZABETH. He was seen by Dr. Gregory Moctezuma and went for ELIZABETH on 2/19/20. Dizziness and blood pressure better since adding a scopolamine patch and blood pressure medication. He did have a fall last night without obvious injury. No one was in the room to help him. REVIEW OF SYSTEMS:  Constitutional: No fevers No chills  Neck:No stiffness  Respiratory: No shortness of breath  Cardiovascular: No chest pain No palpitations  Gastrointestinal: No abdominal pain    Genitourinary: No Dysuria  Neurological: No headache, no confusion      PHYSICAL EXAM:  BP (!) 140/78   Pulse 93   Temp 97.1 °F (36.2 °C) (Temporal)   Resp 17   Ht 6' (1.829 m)   Wt 168 lb (76.2 kg)   SpO2 92%   BMI 22.78 kg/m²     Constitutional: he appears well-developed and well-nourished. Eyes - conjunctiva normal.  Pupils react to light  Ear, nose, throat -hearing intact to voice. No scars, masses, or lesions over external nose or ears, no atrophy of tongue  Neck-symmetric, no masses noted, no jugular vein distension  Respiration- chest wall appears symmetric, good expansion,   normal effort without use of accessory muscles  Cardiovascular- RRR  Musculoskeletal - no significant wasting of muscles noted, no bony deformities, gait no gross ataxia  Extremities-no clubbing, cyanosis or edema  Skin - warm, dry, and intact. No rash, erythema, or pallor.   Psychiatric - mood, affect, and behavior appear normal.      Neurology  NEUROLOGICAL EXAM:      Mental status   Awake, alert, fluent oriented x 3 appropriate affect  Attention and concentration appear appropriate  Recent and remote memory appears unremarkable  Speech normal without dysarthria  No clear issues with language       Cranial Nerves   CN II- Visual fields grossly unremarkable  CN III, IV,VI-EOMI, No nystagmus, conjugate eye movements, no ptosis  CN VII-no

## 2020-02-25 NOTE — PLAN OF CARE
Problem: Falls - Risk of:  Goal: Will remain free from falls  Description  Will remain free from falls  2/25/2020 0000 by Keegan Heath RN  Outcome: Ongoing  2/24/2020 1047 by Karol Porter RN  Outcome: Ongoing  Goal: Absence of physical injury  Description  Absence of physical injury  2/25/2020 0000 by Keegan Heath RN  Outcome: Ongoing  2/24/2020 1047 by Karol Porter RN  Outcome: Ongoing     Problem: Infection:  Goal: Will remain free from infection  Description  Will remain free from infection  2/25/2020 0000 by eKegan Heath RN  Outcome: Ongoing  2/24/2020 1047 by Karol Porter RN  Outcome: Ongoing     Problem: Safety:  Goal: Free from accidental physical injury  Description  Free from accidental physical injury  2/25/2020 0000 by Keegan Heath RN  Outcome: Ongoing  2/24/2020 1047 by Karol Porter RN  Outcome: Ongoing  Goal: Free from intentional harm  Description  Free from intentional harm  2/25/2020 0000 by Keegan Heath RN  Outcome: Ongoing  2/24/2020 1047 by Karol Porter RN  Outcome: Ongoing  Goal: Ability to demonstrate self-control will improve  Description  Ability to demonstrate self-control will improve  2/25/2020 0000 by Keegan Heath RN  Outcome: Ongoing  2/24/2020 1047 by Karol Porter RN  Outcome: Ongoing  Goal: Ability to implement measures to prevent violent behavior in the future will improve  Description  Ability to implement measures to prevent violent behavior in the future will improve  2/25/2020 0000 by Keegan Heath RN  Outcome: Ongoing  2/24/2020 1047 by Karol Porter RN  Outcome: Ongoing  Goal: Ability to redirect hostility and anger into socially appropriate behaviors will improve  Description  Ability to redirect hostility and anger into socially appropriate behaviors will improve  2/25/2020 0000 by Keegan Heath RN  Outcome: Ongoing  2/24/2020 1047 by Karol Porter RN  Outcome: Ongoing     Problem: Safety:  Goal: Free from accidental physical injury  Description  Free from accidental physical injury  2/25/2020 0000 by Tye Ybarra RN  Outcome: Ongoing  2/24/2020 1047 by Arelis Vora RN  Outcome: Ongoing  Goal: Free from intentional harm  Description  Free from intentional harm  2/25/2020 0000 by Tye Ybarra RN  Outcome: Ongoing  2/24/2020 1047 by Arelis Vora RN  Outcome: Ongoing  Goal: Ability to demonstrate self-control will improve  Description  Ability to demonstrate self-control will improve  2/25/2020 0000 by Tye Ybarra RN  Outcome: Ongoing  2/24/2020 1047 by Arelis Vora RN  Outcome: Ongoing  Goal: Ability to implement measures to prevent violent behavior in the future will improve  Description  Ability to implement measures to prevent violent behavior in the future will improve  2/25/2020 0000 by Tye Ybarra RN  Outcome: Ongoing  2/24/2020 1047 by Arelis Vora RN  Outcome: Ongoing  Goal: Ability to redirect hostility and anger into socially appropriate behaviors will improve  Description  Ability to redirect hostility and anger into socially appropriate behaviors will improve  2/25/2020 0000 by Tye Ybarra RN  Outcome: Ongoing  2/24/2020 1047 by Arelis Vora RN  Outcome: Ongoing     Problem: Pain:  Goal: Patient's pain/discomfort is manageable  Description  Patient's pain/discomfort is manageable  2/25/2020 0000 by Tye Ybarra RN  Outcome: Ongoing  2/24/2020 1047 by Arelis Vora RN  Outcome: Ongoing  Goal: Pain level will decrease  Description  Pain level will decrease  2/25/2020 0000 by Tye Ybarra RN  Outcome: Ongoing  2/24/2020 1047 by Arelis Vora RN  Outcome: Ongoing  Goal: Control of acute pain  Description  Control of acute pain  2/25/2020 0000 by Tye Ybarra RN  Outcome: Ongoing  2/24/2020 1047 by Arelis Vora RN  Outcome: Ongoing  Goal: Control of chronic pain  Description  Control of chronic pain  2/25/2020 0000 by Tye Ybarra RN  Outcome: Ongoing  2/24/2020 1047 by Arelis Vora

## 2020-02-25 NOTE — PROGRESS NOTES
paraphasias noted. Significantly decreased thought organization in comparison to previous sessions. Patient was walking with rollator, and walker yesterday. Today patient required max assistance/total assistance for transfers from wheelchair to recliner. Patient had significant decline from yesterdays session. Nursing notified of changes. Recommend continue treatment to address cognition will need to modify goals. Monitor swallow function. SHORT TERM GOAL #1:  Goal 1: Patient will utilize compensatory strategies for short-term memory/recall tasks and during ADLs with minimal cues required to utilize. SHORT TERM GOAL #2:  Goal 2: Patient will complete problem solving/safety awareness tasks during functional ADL's in order to increase functional integration into the environment and decreased assistance from caregivers with 80% accuracy with minimal cues required to complete. SHORT TERM GOAL #3:  Goal 3: The patient will follow multi step commands related to functional living environment with 100% accuracy and min verbal cues in order to increase functional integration into environment. SHORT TERM GOAL #4:  Goal 4: The patient will complete simple/complex naming tasks to improve verbal expression and thought organization for effective communication in all settings with min verbal cues at 80% accuracy. SHORT TERM GOAL #5:  Goal 5: The patient will complete executive function tasks (organizing, planning, self monitoring) with 80% accuracy and min verbal cues during daily living activities to improve safety and awareness in functional living environment.        Comprehension: 4 - Patient understands basic needs 75-90%+ of the time  Expression: 4 - Expresses basic ideas/needs 75-90%+ of the time  Social Interaction: 5 - Patient is appropriate with supervision/cues  Problem Solvin - Patient solves simple/routine tasks 75-90%+   Memory: 3 - Patient remembers 50%-74% of the

## 2020-02-25 NOTE — PROGRESS NOTES
when asked to so. Short term goals   Time Frame for Short term goals 1wk   Short term goal 1 indep bed mobility    Short term goal 2 SBA transfers   Short term goal 3 SBA wc propulstion 150ft   Short term goal 4 SBA amb 150ft with least restrictive AD   Short term goal 5 SBA 1 stair    Long term goals   Time Frame for Long term goals  2wks   Long term goal 1 indep transfers   Long term goal 2 indep wc propulsion 150ft   Long term goal 3 indep amb 150ft with least restrictive AD   Long term goal 4 SBA 4 stairs   Long term goal 5 indep amb 50ft on uneven surfaces   Conditions Requiring Skilled Therapeutic Intervention   Body structures, Functions, Activity limitations Decreased functional mobility ; Decreased ADL status; Decreased ROM; Decreased strength;Decreased safe awareness;Decreased endurance;Decreased sensation;Decreased balance;Decreased vision/visual deficit; Decreased high-level IADLs;Decreased fine motor control;Decreased coordination   Assessment Assisted patient back to bed and left with all needs in reach, several family members in room. Family asking patient many questions and most answers are appropriate and somewhat correct, delayed responses noted. Still some confusion noted overall. Treatment Diagnosis L Hemiparesis/Hemiplegia   Prognosis Good   Safety Devices   Type of devices All fall risk precautions in place; Bed alarm in place;Call light within reach; Left in bed     Electronically signed by Edilson Chase PTA on 2/25/2020 at 5:05 PM

## 2020-02-25 NOTE — PLAN OF CARE
Problem: Falls - Risk of:  Goal: Will remain free from falls  Description  Will remain free from falls  Outcome: Ongoing  Goal: Absence of physical injury  Description  Absence of physical injury  Outcome: Ongoing     Problem: Infection:  Goal: Will remain free from infection  Description  Will remain free from infection  Outcome: Ongoing     Problem: Safety:  Goal: Free from accidental physical injury  Description  Free from accidental physical injury  Outcome: Ongoing  Goal: Free from intentional harm  Description  Free from intentional harm  Outcome: Ongoing  Goal: Ability to demonstrate self-control will improve  Description  Ability to demonstrate self-control will improve  Outcome: Ongoing  Goal: Ability to implement measures to prevent violent behavior in the future will improve  Description  Ability to implement measures to prevent violent behavior in the future will improve  Outcome: Ongoing  Goal: Ability to redirect hostility and anger into socially appropriate behaviors will improve  Description  Ability to redirect hostility and anger into socially appropriate behaviors will improve  Outcome: Ongoing     Problem: Daily Care:  Goal: Daily care needs are met  Description  Daily care needs are met  Outcome: Ongoing     Problem: Pain:  Goal: Patient's pain/discomfort is manageable  Description  Patient's pain/discomfort is manageable  Outcome: Ongoing  Goal: Pain level will decrease  Description  Pain level will decrease  Outcome: Ongoing  Goal: Control of acute pain  Description  Control of acute pain  Outcome: Ongoing  Goal: Control of chronic pain  Description  Control of chronic pain  Outcome: Ongoing     Problem: Skin Integrity:  Goal: Skin integrity will stabilize  Description  Skin integrity will stabilize  Outcome: Ongoing     Problem: Discharge Planning:  Goal: Patients continuum of care needs are met  Description  Patients continuum of care needs are met  Outcome: Ongoing     Problem: IP Coping:  Goal: Ability to verbalize frustrations and anger appropriately will improve  Description  Ability to verbalize frustrations and anger appropriately will improve  Outcome: Ongoing     Problem: Health Behavior:  Goal: Ability to verbalize precipitating factors for violent behavior will improve  Description  Ability to verbalize precipitating factors for violent behavior will improve  Outcome: Ongoing     Problem: Mobility - Impaired:  Goal: Mobility will improve  Description  Mobility will improve  Outcome: Ongoing     Problem: Bleeding:  Goal: Will show no signs and symptoms of excessive bleeding  Description  Will show no signs and symptoms of excessive bleeding  Outcome: Ongoing     Problem: HEMODYNAMIC STATUS  Goal: Patient has stable vital signs and fluid balance  Outcome: Ongoing     Problem: ACTIVITY INTOLERANCE/IMPAIRED MOBILITY  Goal: Mobility/activity is maintained at optimum level for patient  Outcome: Ongoing

## 2020-02-25 NOTE — PROGRESS NOTES
Pt unable to feed himself this at breakfast.  Was not able to understand how to use eating utensils. Was not even albe to pick food up with had and put in mouth. Would try to scoop it up and put in mouth but nothing was in fingers. Unable to verbalized where he was, month, date or year. Would reach for items on bedside table but not able to make contact with items.   Was able to pivot to chair with one assist but max assist.

## 2020-02-26 PROCEDURE — 99233 SBSQ HOSP IP/OBS HIGH 50: CPT | Performed by: PSYCHIATRY & NEUROLOGY

## 2020-02-26 PROCEDURE — 97535 SELF CARE MNGMENT TRAINING: CPT

## 2020-02-26 PROCEDURE — 6370000000 HC RX 637 (ALT 250 FOR IP): Performed by: PSYCHIATRY & NEUROLOGY

## 2020-02-26 PROCEDURE — 1180000000 HC REHAB R&B

## 2020-02-26 PROCEDURE — 6360000002 HC RX W HCPCS: Performed by: PSYCHIATRY & NEUROLOGY

## 2020-02-26 PROCEDURE — 92526 ORAL FUNCTION THERAPY: CPT

## 2020-02-26 PROCEDURE — 97116 GAIT TRAINING THERAPY: CPT

## 2020-02-26 PROCEDURE — 97130 THER IVNTJ EA ADDL 15 MIN: CPT

## 2020-02-26 PROCEDURE — 6370000000 HC RX 637 (ALT 250 FOR IP): Performed by: INTERNAL MEDICINE

## 2020-02-26 PROCEDURE — 97110 THERAPEUTIC EXERCISES: CPT

## 2020-02-26 PROCEDURE — 97129 THER IVNTJ 1ST 15 MIN: CPT

## 2020-02-26 RX ORDER — QUETIAPINE FUMARATE 25 MG/1
25 TABLET, FILM COATED ORAL NIGHTLY
Status: DISCONTINUED | OUTPATIENT
Start: 2020-02-26 | End: 2020-02-28

## 2020-02-26 RX ADMIN — LISINOPRIL 40 MG: 20 TABLET ORAL at 09:08

## 2020-02-26 RX ADMIN — CARBIDOPA AND LEVODOPA 1 TABLET: 50; 200 TABLET, EXTENDED RELEASE ORAL at 13:41

## 2020-02-26 RX ADMIN — CARVEDILOL 3.12 MG: 3.12 TABLET, FILM COATED ORAL at 09:08

## 2020-02-26 RX ADMIN — CLOPIDOGREL BISULFATE 75 MG: 75 TABLET ORAL at 09:08

## 2020-02-26 RX ADMIN — ENOXAPARIN SODIUM 40 MG: 40 INJECTION SUBCUTANEOUS at 20:34

## 2020-02-26 RX ADMIN — CARVEDILOL 3.12 MG: 3.12 TABLET, FILM COATED ORAL at 17:10

## 2020-02-26 RX ADMIN — CARBIDOPA AND LEVODOPA 1 TABLET: 50; 200 TABLET, EXTENDED RELEASE ORAL at 09:08

## 2020-02-26 RX ADMIN — ACETAMINOPHEN 650 MG: 325 TABLET ORAL at 17:10

## 2020-02-26 RX ADMIN — QUETIAPINE FUMARATE 25 MG: 25 TABLET ORAL at 20:34

## 2020-02-26 RX ADMIN — CARBIDOPA AND LEVODOPA 1 TABLET: 50; 200 TABLET, EXTENDED RELEASE ORAL at 20:34

## 2020-02-26 ASSESSMENT — PAIN - FUNCTIONAL ASSESSMENT: PAIN_FUNCTIONAL_ASSESSMENT: ACTIVITIES ARE NOT PREVENTED

## 2020-02-26 ASSESSMENT — PAIN DESCRIPTION - FREQUENCY: FREQUENCY: CONTINUOUS

## 2020-02-26 ASSESSMENT — PAIN DESCRIPTION - PROGRESSION
CLINICAL_PROGRESSION: NOT CHANGED

## 2020-02-26 ASSESSMENT — PAIN DESCRIPTION - ORIENTATION: ORIENTATION: LOWER

## 2020-02-26 ASSESSMENT — PAIN DESCRIPTION - DESCRIPTORS: DESCRIPTORS: ACHING

## 2020-02-26 ASSESSMENT — PAIN DESCRIPTION - ONSET: ONSET: GRADUAL

## 2020-02-26 ASSESSMENT — PAIN SCALES - GENERAL
PAINLEVEL_OUTOF10: 0
PAINLEVEL_OUTOF10: 3

## 2020-02-26 ASSESSMENT — PAIN DESCRIPTION - LOCATION: LOCATION: BACK

## 2020-02-26 ASSESSMENT — PAIN DESCRIPTION - PAIN TYPE: TYPE: CHRONIC PAIN

## 2020-02-26 NOTE — PROGRESS NOTES
[facility]  [facility]ECH [facility]AP[facility]  [facility] 8 REHAB UNIT  TIME   Time In: 1000  Time Out: 56  Minutes: 30       [x]Daily Note  []Progress Note    Date: 2020  Patient Name: Daisy Almanza        MRN: [id number]    Account #: [de-identified]  : 1945  (68 y.o.)  Gender: male   Primary Provider: Fuentes Jefferson MD  Precautions: Fall/Swallowing  Swallowing Status/Diet: Regular diet with thin liquids (consistencies upgraded this date)      Subjective: Patient was alert and cooperative with all tasks this date. Several family members present during treatment time and requesting possible diet upgrade back to regular consistencies. Per reports, patient had confusion and decreased alertness on previous date felt possibly d/t medication. Patient was then downgraded to pureed consistencies d/t decreased alertness and confusion. Patient appears more alert this date. Objective:  Patient consumed pureed consistencies and thin liquids via cup and trial of regular consistencies. Patient was able to feed self. Oral Phase:  Mild extended mastication noted with more solid consistencies, however, bolus prep was adequate. Mastication motion noted during trials of pureed consistencies as well. Extended swallow initiation of pureed consistencies noted. 1-3 second initiation noted with all other PO trials. Patient independently utilized tongue sweep to clear oral cavity of residue without prompting. Pharyngeal Phase:  Mild sluggish and adequate strength of laryngeal elevation noted with all PO trials. No overt s/s of aspiration/penetration noted with any PO trials post swallows.      Recommendations:  Trial diet of regular consistencies with thin liquids  Meds whole in applesauce/pudding  Assist/monitor during meals  Eat/feed slowly   Small bites/sips  Upright as possible for all PO intake  Remain upright for at least 30-40 minutes following all meals   Oral care        SHORT TERM GOAL #1:  Goal 1: Patient will utilize

## 2020-02-26 NOTE — PROGRESS NOTES
Name: Daisy Almanza  MRN:  [id number]  Date of service:  2/26/2020 02/26/20 1111   Subjective   Subjective Pt states he is feeling better today and wants to work with therapy   Ambulation   Ambulation? Yes   WB Status WBAT   More Ambulation? Yes   Ambulation 1   Surface level tile   Device Rolling Walker   Other Apparatus Wheelchair follow   Distance 20'   Comments pt became dizzy and wanted to sit down    Ambulation 2   Surface - 2 level tile   Device 2 Rolling Walker   Other Apparatus 2 Wheelchair follow   Assistance 2 Contact guard assistance   Distance 48'   Comments pt continues to states he is dizzy and feels like he is going up and down. Propulsion 1   Propulsion Manual   Level Level Tile   Method RUE;LUE   Level of Assistance Modified independent   Distance 250'   Exercises   Hamstring Sets HS curls 30x yellow t-band   Hip Flexion 30x   Hip Abduction 30x yellow t-band   Knee Long Arc Quad 30x   Ankle Pumps 30x heel raises 30x   Comments hip add ball squeeze 30x   Short term goals   Time Frame for Short term goals 1wk   Short term goal 1 indep bed mobility    Short term goal 2 SBA transfers   Short term goal 3 SBA wc propulstion 150ft   Short term goal 4 SBA amb 150ft with least restrictive AD   Short term goal 5 SBA 1 stair    Long term goals   Time Frame for Long term goals  2wks   Long term goal 1 indep transfers   Long term goal 2 indep wc propulsion 150ft   Long term goal 3 indep amb 150ft with least restrictive AD   Long term goal 4 SBA 4 stairs   Long term goal 5 indep amb 50ft on uneven surfaces   Conditions Requiring Skilled Therapeutic Intervention   Body structures, Functions, Activity limitations Decreased functional mobility ; Decreased ADL status; Decreased ROM; Decreased strength;Decreased safe awareness;Decreased endurance;Decreased sensation;Decreased balance;Decreased vision/visual deficit; Decreased high-level IADLs;Decreased fine motor control;Decreased coordination   Assessment Pt

## 2020-02-26 NOTE — PROGRESS NOTES
[facility]  [facility]PATIENT SPEECH [facility]ERAPY  [facility] 8 REHAB UNIT  TIME   Time In: 1430  Time Out: 1500  Minutes: 30       [x]Daily Note  []Progress Note    Date: 2020  Patient Name: Lolita Babb        MRN: [id number]    Account #: [de-identified]  : 1945  (75 y.o.)  Gender: male   Primary Provider: Karen Carbajal MD  Precautions: Fall  Swallowing Status/Diet: Regular consistencies with thin liquids      Subjective: Patient was alert and cooperative with all tasks. Family members present during treatment time this date. Objective:  Patient oriented to specific hospital, month, and year. Patient was able to self correct city after mild verbal prompting. Education provided to utilize whiteboard in room for orientation. Patient completed a simple following directions task using peg/peg board. Patient required to follow different patterns when directions were provided verbally. Patient completed the task with 60% accuracy and required several repetitions and verbal/visual cues to complete. Patient was able to state location and purpose of call light following mild verbal cues this date. Patient change in status appears to be improved as compared to previous treatment date. Will continue to follow. SHORT TERM GOAL #1:  Goal 1: Patient will utilize compensatory strategies for short-term memory/recall tasks and during ADLs with minimal cues required to utilize. SHORT TERM GOAL #2:  Goal 2: Patient will complete problem solving/safety awareness tasks during functional ADL's in order to increase functional integration into the environment and decreased assistance from caregivers with 80% accuracy with minimal cues required to complete. SHORT TERM GOAL #3:  Goal 3: The patient will follow multi step commands related to functional living environment with 100% accuracy and min verbal cues in order to increase functional integration into environment.      SHORT TERM GOAL #4:  Goal 4: The patient will

## 2020-02-26 NOTE — PROGRESS NOTES
Patient:   Frank Smith  MR#:    [id number]   Room:    7641/400-24   YOB: 1945  Date of Progress Note: 2/26/2020  Time of Note                           8:09 AM  Consulting Physician:   Ty Gold M.D. Attending Physician:  Ty Gold MD     CHIEF COMPLAINT: Left-sided weakness, ataxia, dysarthria        Subjective  This 75 y.o. male  with history of Parkinson's disease followed by ,HTN,depression and right frontal meningioma that is followed by Dr. Lottie Ivory as outpatient. He presented to [facility] ER on 2/16/20 with c/o unsteadiness of gain,left leg weakness and occasional changes in speech that started sometime during the night and continued throughout the day on Sunday. CT of head and CTA head/neck were negative. He was not a candidate for TPA d/t being out of the timeframe. He was admitted to the hospitalist service with consult for neurology. He was seen by neurologist Dr. Kevin Jo, who felt he had had a right hemispheric stroke. MRI done confirmed a right thalamic stroke. He was supposed to be taking aspirin at home but did not take it routinely. Dr. Kevin Jo placed him back on aspirin 325 mg a day and discontinued Plavix. Patient was evaluated by SPT and found to exhibit slow oral prep of more solid consistencies as well as sluggish, inconsistently mildly decreased laryngeal elevation for swallow airway protection. Patient is currently on a regular diet with thin liquids, medicines to be given in pudding or applesauce with assist for feeding. Patient also exhibited slow processing, delayed auditory comprehension, delayed verbal responses with mild-moderate fragmentations observed where the patient started expressions but then discontinued, and decreased high level reasoning/problem solving. Patient continues to have left sided weakness and had an assisted fall with staff on 2/18/20 bumping his left elbow on the door frame. X-ray of left elbow was negative.  Patient also started having Cerebellar F-N intact     Tremor None present     Gait                  Not tested           Nursing/pcp notes, imaging,labs and vitals reviewed. PT,OT and/or speech notes reviewed    Lab Results   Component Value Date    WBC 5.5 02/24/2020    HGB 14.3 02/24/2020    HCT 44.5 02/24/2020    MCV 87.3 02/24/2020     02/24/2020     Lab Results   Component Value Date     02/24/2020    K 3.8 02/24/2020     02/24/2020    CO2 25 02/24/2020    BUN 13 02/24/2020    CREATININE 0.8 02/24/2020    GLUCOSE 85 02/24/2020    CALCIUM 9.0 02/24/2020    PROT 7.7 02/16/2020    LABALBU 4.4 02/16/2020    BILITOT 0.4 02/16/2020    ALKPHOS 88 02/16/2020    AST 13 02/16/2020    ALT <5 (A) 02/16/2020    LABGLOM >60 02/24/2020     Lab Results   Component Value Date    INR 1.06 02/16/2020   No results found for: PHENYTOIN, ESR, CRP    PHYSICAL THERAPY  STRENGTH  Strength RLE  Comment: GROSSLY 4/5  Strength LLE  Comment: GROSSLY 4/5  ROM  AROM RLE (degrees)  RLE AROM: WFL  AROM LLE (degrees)  LLE AROM : WFL  BED MOBILITY  Bed Mobility  Rolling: Stand by assistance  Supine to Sit: Contact guard assistance  Sit to Supine: Stand by assistance  Scooting: Stand by assistance  Comment: Worked on bedside sitting, patient leaning backwards upon initial sit, cues to scoot out to edge of bed  TRANSFERS  Transfers  Sit to Stand: Minimal Assistance  Stand to sit: Minimal Assistance  Bed to Chair: Minimal assistance, Moderate assistance  Stand Pivot Transfers: Minimal Assistance, Moderate Assistance  Car Transfer: Contact guard assistance  Comment: Patient seems to have trouble processing instructions today. Difficulty figuring out which way to turn when transferring bed to wheelchair.    WHEELCHAIR PROPULSION  Propulsion 1  Propulsion: Manual  Level: Level Tile  Method: BI, LOND[place], RLE, LLE  Level of Assistance: Contact guard assistance  Description/ Details: Pt can self correct path deviations, adequate velocity   Distance: 200ft(with turns )  AMBULATION  Ambulation 1  Surface: level tile  Device: Rolling Walker  Other Apparatus: Wheelchair follow  Assistance: Minimal assistance, 2 Person assistance(assist x 2 for safety due to patient's decline today)  Quality of Gait: Patient leaning slightly to the right, decreased step length and foot clearance with left LE. Patient with narrow DRAKE, close to scissoring, but never actually did, but patient is not aligned in middle of the walker, hugging the left side of the walker. Gait Deviations: Slow Neli, Decreased step length, Decreased step height, Deviated path  Distance: 200 feet  Comments: decreased B foot clearance and heel strike on R   STAIRS  Stairs  # Steps : 8  Stairs Height: 4\"  Rails: Bilateral  Assistance: Minimal assistance, Contact guard assistance  Comment: INITIALLY BEGAN RECIPROCAL ASCENSION/DESCENSION. NO PROBLEMS ASCENDING. LEFT KNEE BUCKLE DURING ATTEMPTED DESCENSION WITH RIGHT LE. CUED FOR PROPER STEP TO UP WITH RIGHT, DOWN WITH LEFT. GOALS:  Short term goals  Time Frame for Short term goals: 1wk  Short term goal 1: indep bed mobility   Short term goal 2: SBA transfers  Short term goal 3: SBA wc propulstion 150ft  Short term goal 4: SBA amb 150ft with least restrictive AD  Short term goal 5: SBA 1 stair      Long term goals  Time Frame for Long term goals : 2wks  Long term goal 1: indep transfers  Long term goal 2: indep wc propulsion 150ft  Long term goal 3: indep amb 150ft with least restrictive AD  Long term goal 4: SBA 4 stairs  Long term goal 5: indep amb 50ft on uneven surfaces     ASSESSMENT:  Assessment: Assisted patient back to bed and left with all needs in reach, several family members in room. Family asking patient many questions and most answers are appropriate and somewhat correct, delayed responses noted.  Still some confusion noted overall.         SPEECH THERAPY  Precautions: Fall  Swallowing Status/Diet: Regular diet with thin increased confusion, decreased alertness and much different than previous sessions.      Objective: Patient presenting with significant left inattention and decreased proprioception and visouspatial this is new from previous sessions. Patient required a feeder this morning with breakfast. Patient was unable to bring food to his mouth (reported by nursing staff). SLP reassessed swallow function today. Patient has been on regular diet consistency. Due to increased confusion and decreased alertness recommend downgrade to pureed consistency. Patient required cues to open mouth and was noted to begin chewing puree. No residue was noted post swallow with puree nor any s/s of aspiration. Thin liquids administered via cup. No overt s/s of aspiration noted today. Will monitor swallow function and will have nursing staff also supervise meals.      Patient was able to follow simple 1 step commands at times, however inconsistent and very delayed processing noted. Significant decreased in initiation for conversation and during simple tasks. Patient has been oriented to place and time in previous sessions. Patient was not oriented to place, situation, time, or biographical information.      Attempted a simple task where patient had to match the word to object. Task was discontinued because it was to difficult for patient to follow. Naming simple objects completed to address word finding and verbal expression. Patient naming 3/6 items correctly. This is new from previous session. Semantic paraphasias noted.      Significantly decreased thought organization in comparison to previous sessions.      Patient was walking with rollator, and walker yesterday. Today patient required max assistance/total assistance for transfers from wheelchair to recliner.      Patient had significant decline from yesterdays session.  Nursing notified of changes.     SHORT TERM GOAL #1:  Goal 1: Patient will utilize compensatory strategies for short-term memory/recall tasks and during ADLs with minimal cues required to utilize.      SHORT TERM GOAL #2:  Goal 2: Patient will complete problem solving/safety awareness tasks during functional ADL's in order to increase functional integration into the environment and decreased assistance from caregivers with 80% accuracy with minimal cues required to complete.     SHORT TERM GOAL #3:  Goal 3: The patient will follow multi step commands related to functional living environment with 100% accuracy and min verbal cues in order to increase functional integration into environment.      SHORT TERM GOAL #4:  Goal 4: The patient will complete simple/complex naming tasks to improve verbal expression and thought organization for effective communication in all settings with min verbal cues at 80% accuracy.      SHORT TERM GOAL #5:  Goal 5: The patient will complete executive function tasks (organizing, planning, self monitoring) with 80% accuracy and min verbal cues during daily living activities to improve safety and awareness in functional living environment.      Long-term Goals  Goal 1: The patient will comprehend communication related to basic medical and social needs and utilize compensatory strategies to maintain safety and participate socially in functional living environment. Goal 2: The patient will develop functional cognitive linguistic based skills and utilize compensatory strategies to communicate wants and needs effectively to different conversational partners, maintain safety and participate socially in functional living environment.         Comprehension: 4 - Patient understands basic needs 75-90%+ of the time  Expression: 4 - Expresses basic ideas/needs 75-90%+ of the time  Social Interaction: 5 - Patient is appropriate with supervision/cues  Problem Solvin - Patient solves simple/routine tasks 75-90%+   Memory: 3 - Patient remembers 50%-74% of the time     ASSESSMENT:  Assessment: [x]? ?Progressing

## 2020-02-26 NOTE — PROGRESS NOTES
Occupational Therapy  Facility/Department: [facility] 8 REHAB UNIT  Daily Treatment Note  NAME: Camacho Medeiros  : 1945  MRN: [id number]    Date of Service: 2020    Discharge Recommendations:  Home with Home health OT       Assessment     Decreased functional mobility ; Decreased ADL status; Decreased ROM; Decreased strength; Decreased safe awareness; Decreased cognition; Decreased endurance; Decreased balance; Decreased vision/visual deficit; Decreased high-level IADLs; Decreased fine motor control; Decreased coordination  Safety Devices  Safety Devices in place: Yes  Type of devices: Call light within reach; Chair alarm in place         Patient Diagnosis(es): There were no encounter diagnoses. has a past medical history of Cancer ([facility] [address] Neuropathy, Parkinson disease ([facility] [address] BARBARA (stress urinary incontinence), male, and Vitamin B12 deficiency. has a past surgical history that includes Prostatectomy; Neck surgery; back surgery; and Mohs surgery. Restrictions  Restrictions/Precautions  Restrictions/Precautions: Fall Risk  Subjective   General  Chart Reviewed: Yes  Patient assessed for rehabilitation services?: Yes  Additional Pertinent Hx: Parkinson's disease, history of prostate cancer, peripheral neuropathy, hypertension, right frontal meningioma  Family / Caregiver Present: Yes  Diagnosis: Right CVA  Subjective  Subjective: Pt. reported dizziness, BP taken. General Comment  Comments: Pt. demonstrated a significant functional decline since previous tx session. Nursing notified of changes.          Objective    Balance  Standing Balance: Minimal assistance(Multiple LOB backwards in shower, requiring Min A to correct )  Functional Mobility  Functional - Mobility Device: Rolling Walker  Activity: To/from bathroom  Assist Level: Minimal assistance  Functional Mobility Comments: Min A for LOB x2 to/from bathroom      Transfers  Sit to stand: Contact guard assistance  Stand to sit: Contact guard assistance Plan   Plan  Current Treatment Recommendations: Strengthening, Wheelchair Mobility Training, ROM, Safety Education & Training, Balance Training, Patient/Caregiver Education & Training, Self-Care / ADL, Cognitive/Perceptual Training, Functional Mobility Training, Neuromuscular Re-education, Equipment Evaluation, Education, & procurement, Home Management Training, Endurance Training, Cognitive Reorientation       OutComes Score       02/26/20 0900   Oral Hygiene   Assistance Needed Supervision or touching assistance   CARE Score 4   [address] Needed Supervision or touching assistance   Comment CGA   CARE Score 4   Shower/Bathe Self   Assistance Needed Supervision or touching assistance   Comment CGA, due to LOB backwards x3, multiple VC to hold onto VB with one hand    CARE Score 4   Upper Body Dressing   Assistance Needed Setup or clean-up assistance   CARE Score 5   Lower Body Dressing   Assistance Needed Partial/moderate assistance   Comment Min A with donning pants    CARE Score 3   Putting On/Taking Off Footwear   Assistance Needed Setup or clean-up assistance   CARE Score 5                                               Goals  Short term goals  Time Frame for Short term goals: 1 week   Short term goal 1: pt will complete overall toileting with supervision   Short term goal 2: pt will complete overall bathing with supervision   Short term goal 3: MET  Short term goal 4: pt will complete overall lower body dressing with supervision   Short term goal 5: pt will complete toilet transfers with supervision   Short term goal 6: pt will complete 5 bilateral strengthening acts to increase overall strength   Long term goals  Time Frame for Long term goals : 2 weeks   Long term goal 1: pt will complete overall toileting with modified independence  Long term goal 2: pt will complete overall bathing with modified independence  Long term goal 3: pt will complete overall upper body dressing with modified independence   Long term goal 4: pt will complete overall lower body dressing with modified independence   Long term goal 5: pt will complete toilet transfers with modified independence   Long term goals 6: pt will complete HEP independently   Long term goal 7: pt will verbalize DME needs   Patient Goals   Patient goals : to return home        Therapy Time   Individual Concurrent Group Co-treatment   Time In 0900         Time Out 1010         Minutes 70         Timed Code Treatment Minutes: [address], OT

## 2020-02-27 LAB
ANION GAP SERPL CALCULATED.3IONS-SCNC: 15 MMOL/L (ref 7–19)
BASOPHILS ABSOLUTE: 0.1 K/UL (ref 0–0.2)
BASOPHILS RELATIVE PERCENT: 1.2 % (ref 0–1)
BUN BLDV-MCNC: 20 MG/DL (ref 8–23)
CALCIUM SERPL-MCNC: 8.7 MG/DL (ref 8.8–10.2)
CHLORIDE BLD-SCNC: 104 MMOL/L (ref 98–111)
CO2: 23 MMOL/L (ref 22–29)
CREAT SERPL-MCNC: 0.9 MG/DL (ref 0.5–1.2)
EOSINOPHILS ABSOLUTE: 0.3 K/UL (ref 0–0.6)
EOSINOPHILS RELATIVE PERCENT: 4.4 % (ref 0–5)
GFR NON-AFRICAN AMERICAN: >60
GLUCOSE BLD-MCNC: 82 MG/DL (ref 74–109)
HCT VFR BLD CALC: 43.8 % (ref 42–52)
HEMOGLOBIN: 13.9 G/DL (ref 14–18)
IMMATURE GRANULOCYTES #: 0 K/UL
LYMPHOCYTES ABSOLUTE: 1.3 K/UL (ref 1.1–4.5)
LYMPHOCYTES RELATIVE PERCENT: 22.8 % (ref 20–40)
MCH RBC QN AUTO: 28.1 PG (ref 27–31)
MCHC RBC AUTO-ENTMCNC: 31.7 G/DL (ref 33–37)
MCV RBC AUTO: 88.7 FL (ref 80–94)
MONOCYTES ABSOLUTE: 0.7 K/UL (ref 0–0.9)
MONOCYTES RELATIVE PERCENT: 11.5 % (ref 0–10)
NEUTROPHILS ABSOLUTE: 3.5 K/UL (ref 1.5–7.5)
NEUTROPHILS RELATIVE PERCENT: 59.6 % (ref 50–65)
PDW BLD-RTO: 13.8 % (ref 11.5–14.5)
PLATELET # BLD: 212 K/UL (ref 130–400)
PMV BLD AUTO: 10.8 FL (ref 9.4–12.4)
POTASSIUM REFLEX MAGNESIUM: 3.6 MMOL/L (ref 3.5–5)
RBC # BLD: 4.94 M/UL (ref 4.7–6.1)
SODIUM BLD-SCNC: 142 MMOL/L (ref 136–145)
WBC # BLD: 5.9 K/UL (ref 4.8–10.8)

## 2020-02-27 PROCEDURE — 97530 THERAPEUTIC ACTIVITIES: CPT

## 2020-02-27 PROCEDURE — 6370000000 HC RX 637 (ALT 250 FOR IP): Performed by: INTERNAL MEDICINE

## 2020-02-27 PROCEDURE — 85025 COMPLETE CBC W/AUTO DIFF WBC: CPT

## 2020-02-27 PROCEDURE — 97116 GAIT TRAINING THERAPY: CPT

## 2020-02-27 PROCEDURE — 92526 ORAL FUNCTION THERAPY: CPT

## 2020-02-27 PROCEDURE — 6360000002 HC RX W HCPCS: Performed by: PSYCHIATRY & NEUROLOGY

## 2020-02-27 PROCEDURE — 92507 TX SP LANG VOICE COMM INDIV: CPT

## 2020-02-27 PROCEDURE — 1180000000 HC REHAB R&B

## 2020-02-27 PROCEDURE — 80048 BASIC METABOLIC PNL TOTAL CA: CPT

## 2020-02-27 PROCEDURE — 99232 SBSQ HOSP IP/OBS MODERATE 35: CPT | Performed by: PSYCHIATRY & NEUROLOGY

## 2020-02-27 PROCEDURE — 6370000000 HC RX 637 (ALT 250 FOR IP): Performed by: PSYCHIATRY & NEUROLOGY

## 2020-02-27 PROCEDURE — 97535 SELF CARE MNGMENT TRAINING: CPT

## 2020-02-27 PROCEDURE — 36415 COLL VENOUS BLD VENIPUNCTURE: CPT

## 2020-02-27 PROCEDURE — 97110 THERAPEUTIC EXERCISES: CPT

## 2020-02-27 RX ADMIN — LISINOPRIL 40 MG: 20 TABLET ORAL at 08:18

## 2020-02-27 RX ADMIN — QUETIAPINE FUMARATE 25 MG: 25 TABLET ORAL at 22:09

## 2020-02-27 RX ADMIN — ENOXAPARIN SODIUM 40 MG: 40 INJECTION SUBCUTANEOUS at 22:09

## 2020-02-27 RX ADMIN — CARBIDOPA AND LEVODOPA 1 TABLET: 50; 200 TABLET, EXTENDED RELEASE ORAL at 14:00

## 2020-02-27 RX ADMIN — CARBIDOPA AND LEVODOPA 1 TABLET: 50; 200 TABLET, EXTENDED RELEASE ORAL at 08:18

## 2020-02-27 RX ADMIN — CLOPIDOGREL BISULFATE 75 MG: 75 TABLET ORAL at 08:18

## 2020-02-27 RX ADMIN — CARBIDOPA AND LEVODOPA 1 TABLET: 50; 200 TABLET, EXTENDED RELEASE ORAL at 22:09

## 2020-02-27 RX ADMIN — CARVEDILOL 3.12 MG: 3.12 TABLET, FILM COATED ORAL at 08:18

## 2020-02-27 RX ADMIN — CARVEDILOL 3.12 MG: 3.12 TABLET, FILM COATED ORAL at 16:53

## 2020-02-27 ASSESSMENT — PAIN SCALES - GENERAL: PAINLEVEL_OUTOF10: 0

## 2020-02-27 ASSESSMENT — PAIN DESCRIPTION - PROGRESSION: CLINICAL_PROGRESSION: NOT CHANGED

## 2020-02-27 NOTE — PROGRESS NOTES
Physical Therapy  Patito Da Silva  [id number]     02/27/20 1107   Subjective   Subjective Patient up in w/c and agrees to work with therapy. Transfers   Sit to Stand Contact guard assistance   Stand to sit Contact guard assistance   Ambulation   Ambulation? Yes   WB Status WBAT   More Ambulation? Yes   Ambulation 1   Surface level tile   Device Rolling Walker   Other Apparatus Wheelchair follow   Assistance Minimal assistance;Contact guard assistance   Distance 200'   Comments Patient did not require a seated rest break with increased ambulation. Patient's L LE was getting fatigued and did drag L foot once near the end of gait. Ambulation 2   Surface - 2 level tile   Device 2 Rolling Walker   Other Apparatus 2 Wheelchair follow   Assistance 2 Contact guard assistance   Quality of Gait 2 faster pace than first ambulation   Distance 200'   Comments did not require seated rest break   Ambulation 3   Surface - 3 level tile   Device 3 Rolling Walker   Other Apparatus 3 Wheelchair follow   Assistance 3 Contact guard assistance;Stand by assistance   Distance 200'   Comments did not require seated rest break   Exercises   Hip Flexion 30   Hip Abduction 30   Knee Long Arc Quad 30   Ankle Pumps 30   Comments B LE ex's in sitting and HS curls with red theraband and ball squeezes to increase strength and functional mobility. Other Activities   Comment Patient did well with therapy. Patient agreed to sit up in recliner after treatment. Patient positioned for comfort with all needs in reach and family present.    Short term goals   Time Frame for Short term goals 1wk   Short term goal 1 indep bed mobility    Short term goal 2 SBA transfers   Short term goal 3 SBA wc propulstion 150ft   Short term goal 4 SBA amb 150ft with least restrictive AD   Short term goal 5 SBA 1 stair    Long term goals   Time Frame for Long term goals  2wks   Long term goal 1 indep transfers   Long term goal 2 indep wc propulsion 150ft   Long term goal 3 indep amb 150ft with least restrictive AD   Long term goal 4 SBA 4 stairs   Long term goal 5 indep amb 50ft on uneven surfaces   Activity Tolerance   Activity Tolerance Patient Tolerated treatment well   Safety Devices   Type of devices Call light within reach; Left in chair   Electronically signed by Varun Agudelo PTA on 2/27/2020 at 11:54 AM

## 2020-02-27 NOTE — PROGRESS NOTES
Speech Therapy  TIME   Time In: 07:00  Time Out: 08:00  Minutes: 60     [x]? Daily Note  []? Progress Note     Date: 20  Patient Name: Cecil Carl        MRN:   [id number]    Account #: [de-identified]  : 1945  (68 y.o.)  Gender: Male   Primary Provider: Mary Airas MD  Precautions: Fall  Swallowing Status/Diet: Regular consistencies with thin liquids     Subjective: Patient was considered lethargic upon entry. Patient did increase alertness with stimulation and maintained alertness for duration of therapy session.     Objective:  Targeted comprehension with emphasis on multi step commands. In structured activity, patient demonstrated ability to follow 2 step commands with incorporation of temporal concept \"before\" with 20% of opportunities at independent level. Patient demonstrated ability to follow 2 step commands with incorporation of temporal concept \"after\" with 20% of opportunities independently. Patient demonstrated ability to follow 2 step commands with incorporation of temporal concept \"then\" with 100% of opportunities at independent level. Along with comprehension, observed patient's swallowing function. With regular solid consistency presentations, administered independently, patient exhibited slow oral prep. Oral transit of regular solid consistency primarily measured 1-2 seconds in length and min oral cavity residue was noted post swallows; residue cleared from the mouth with additional dry swallows. Oral transit of thin liquid presentations, administered independently via straw, primarily measured 1-2 seconds in length. Laryngeal elevation during swallow initiation was considered to be sluggish and mild-moderately decreased for swallow airway protection. Even so, no outward S/S penetration/aspiration was observed with any regular solid consistency presentation or thin liquid presentation administered during treatment session. At this time, would recommend continuation current diet.  Meds in pudding/applesauce. Assist during meals. SHORT TERM GOAL #1:  Goal 1: Patient will utilize compensatory strategies for short-term memory/recall tasks and during ADLs with minimal cues required to utilize.      SHORT TERM GOAL #2:  Goal 2: Patient will complete problem solving/safety awareness tasks during functional ADL's in order to increase functional integration into the environment and decreased assistance from caregivers with 80% accuracy with minimal cues required to complete.     SHORT TERM GOAL #3:  Goal 3: The patient will follow multi step commands related to functional living environment with 100% accuracy and min verbal cues in order to increase functional integration into environment.      SHORT TERM GOAL #4:  Goal 4: The patient will complete simple/complex naming tasks to improve verbal expression and thought organization for effective communication in all settings with min verbal cues at 80% accuracy.      SHORT TERM GOAL #5:  Goal 5: The patient will complete executive function tasks (organizing, planning, self monitoring) with 80% accuracy and min verbal cues during daily living activities to improve safety and awareness in functional living environment.      Comprehension: 4 - Patient understands basic needs 75-90%+ of the time  Expression: 4 - Expresses basic ideas/needs 75-90%+ of the time  Social Interaction: 4 - Patient appropriate 75-90%+ of the time  Problem Solving: 3 - Patient solves simple/routine tasks 50%-74%  Memory: 2 - Patient remembers 25%-49% of the time     ASSESSMENT:  Assessment: [x]? Progressing towards goals           []? Not Progressing towards goals     Patient Tolerance of Treatment:       [x]? Tolerated well          []? Tolerated fair           []? Required rest breaks          []? Fatigued     Education:  Learner:  [x]? Patient          []? Significant Other          [x]? Other  Education provided regarding:  [x]? Goals and POC                               []?Diet and

## 2020-02-27 NOTE — PLAN OF CARE
Problem: Falls - Risk of:  Goal: Will remain free from falls  Description  Will remain free from falls  2/27/2020 1117 by Joaquin Caldwell RN  Outcome: Ongoing  2/27/2020 0133 by Melina Altman RN  Outcome: Ongoing   Up with one assist with walker

## 2020-02-27 NOTE — PROGRESS NOTES
Pt can self correct path deviations, adequate velocity   Distance: 200ft(with turns )  AMBULATION  Ambulation 1  Surface: level tile  Device: Rolling Walker  Other Apparatus: Wheelchair follow  Assistance: Minimal assistance, 2 Person assistance(assist x 2 for safety due to patient's decline today)  Quality of Gait: Patient leaning slightly to the right, decreased step length and foot clearance with left LE. Patient with narrow DRAKE, close to scissoring, but never actually did, but patient is not aligned in middle of the walker, hugging the left side of the walker. Gait Deviations: Slow Neli, Decreased step length, Decreased step height, Deviated path  Distance: 200 feet  Comments: decreased B foot clearance and heel strike on R   STAIRS  Stairs  # Steps : 8  Stairs Height: 4\"  Rails: Bilateral  Assistance: Minimal assistance, Contact guard assistance  Comment: INITIALLY BEGAN RECIPROCAL ASCENSION/DESCENSION. NO PROBLEMS ASCENDING. LEFT KNEE BUCKLE DURING ATTEMPTED DESCENSION WITH RIGHT LE. CUED FOR PROPER STEP TO UP WITH RIGHT, DOWN WITH LEFT. GOALS:  Short term goals  Time Frame for Short term goals: 1wk  Short term goal 1: indep bed mobility   Short term goal 2: SBA transfers  Short term goal 3: SBA wc propulstion 150ft  Short term goal 4: SBA amb 150ft with least restrictive AD  Short term goal 5: SBA 1 stair      Long term goals  Time Frame for Long term goals : 2wks  Long term goal 1: indep transfers  Long term goal 2: indep wc propulsion 150ft  Long term goal 3: indep amb 150ft with least restrictive AD  Long term goal 4: SBA 4 stairs  Long term goal 5: indep amb 50ft on uneven surfaces     ASSESSMENT:  Assessment: Assisted patient back to bed and left with all needs in reach, several family members in room. Family asking patient many questions and most answers are appropriate and somewhat correct, delayed responses noted.  Still some confusion noted overall.         SPEECH THERAPY  Precautions: Fall  Swallowing Status/Diet: Regular diet with thin liquids        Subjective: Patient alert and cooperative with all therapy tasks. Patient continues to complain of dizziness.      Objective: Patient completed simple laundry task where he had to hang up shirt, button/zip shirts, and pair socks. Increased difficulty noted secondary to visual deficits and decreased fine motor skills. Some difficulty also noted with sequencing. Patient required extended time to complete task and verbal cues.      Patient using a rollator during ambulation tasks. Patient required max cues for locking/unlocking rollator brakes. Patient also noted to have increased difficulty maneuvering the rollator and turning to sit. Patient turning and ending up very far from his seat. Patient also noted to have 3 LOB during session. Patient continues to require max cues for safety when completing sit to stand/stand to sit.      Patient noted to have increased difficulty motor planning and following simple commands given by SLP and OT during structured and unstructured tasks.      Patient was oriented to time and place today with no difficulty. Patient does continue to have significant decreased short term recall. Patient has difficulty recalling recent/daily events and was unable to recall different tasks he had completed earlier today in therapy sessions.      SLP and patient reviewed memory strategies addressed in therapy session on Saturday (2/22/20). Patient required moderate cues to recall (repetition and using external aid). Utilized both strategies during immediate recall tasks when patient utilizes improvement was noted.      When SLP saw patient later in the day patient was able to recall some of the tasks completed in Co-tx session independently and then a few more after moderate verbal cues.       Reviewed use for call light.  Patient demonstrating understanding, however at times will try to get up on his changes.     SHORT TERM GOAL #1:  Goal 1: Patient will utilize compensatory strategies for short-term memory/recall tasks and during ADLs with minimal cues required to utilize.      SHORT TERM GOAL #2:  Goal 2: Patient will complete problem solving/safety awareness tasks during functional ADL's in order to increase functional integration into the environment and decreased assistance from caregivers with 80% accuracy with minimal cues required to complete.     SHORT TERM GOAL #3:  Goal 3: The patient will follow multi step commands related to functional living environment with 100% accuracy and min verbal cues in order to increase functional integration into environment.      SHORT TERM GOAL #4:  Goal 4: The patient will complete simple/complex naming tasks to improve verbal expression and thought organization for effective communication in all settings with min verbal cues at 80% accuracy.      SHORT TERM GOAL #5:  Goal 5: The patient will complete executive function tasks (organizing, planning, self monitoring) with 80% accuracy and min verbal cues during daily living activities to improve safety and awareness in functional living environment.      Long-term Goals  Goal 1: The patient will comprehend communication related to basic medical and social needs and utilize compensatory strategies to maintain safety and participate socially in functional living environment.    Goal 2: The patient will develop functional cognitive linguistic based skills and utilize compensatory strategies to communicate wants and needs effectively to different conversational partners, maintain safety and participate socially in functional living environment.         Comprehension: 4 - Patient understands basic needs 75-90%+ of the time  Expression: 4 - Expresses basic ideas/needs 75-90%+ of the time  Social Interaction: 5 - Patient is appropriate with supervision/cues  Problem Solvin - Patient solves simple/routine tasks 75-90%+ Memory: 3 - Patient remembers 50%-74% of the time     ASSESSMENT:  Assessment: [x]? ?Progressing towards goals           []? ? Not Progressing towards goals     GOALS MET:   0  STG 0   LTG     OCCUPATIONAL THERAPY     CURRENT IRF-NIKKY SCORES  Eating: CARE Score: 6  Oral Hygiene: CARE Score: 4  Toileting: CARE Score: 4  Shower/Bathe: CARE Score: 4  Upper Body Dressing: CARE Score: 5  Lower Body Dressing: CARE Score: 4  Footwear: CARE Score: 5  Toilet Transfers: CARE Score: 4  Picking Up Object:  CARE Score: 2      scores above do not reflect current functional ability. Patient has experienced a decline. Scores above reflect ability prior to decline.     UE Functioning:  BUE AROM WFL      Pain Assessment:  Pain Level: 0  Pain Location: Wrist     STGs:  Short term goals  Time Frame for Short term goals: 1 week   Short term goal 1: pt will complete overall toileting with supervision   Short term goal 2: pt will complete overall bathing with supervision   Short term goal 3: MET  Short term goal 4: pt will complete overall lower body dressing with supervision   Short term goal 5: pt will complete toilet transfers with supervision   Short term goal 6: pt will complete 5 bilateral strengthening acts to increase overall strength      LTGs:  Long term goals  Time Frame for Long term goals : 2 weeks   Long term goal 1: pt will complete overall toileting with modified independence  Long term goal 2: pt will complete overall bathing with modified independence  Long term goal 3: pt will complete overall upper body dressing with modified independence   Long term goal 4: pt will complete overall lower body dressing with modified independence   Long term goal 5: pt will complete toilet transfers with modified independence   Long term goals 6: pt will complete HEP independently   Long term goal 7: pt will verbalize DME needs      Assessment:  Decreased functional mobility ; Decreased ADL status; Decreased ROM;  Decreased strength; Decreased safe awareness; Decreased cognition; Decreased endurance; Decreased balance; Decreased vision/visual deficit; Decreased high-level IADLs; Decreased fine motor control; Decreased coordination                    NUTRITION  Current Wt: Weight: 168 lb (76.2 kg) / Body mass index is 22.78 kg/m². Admission Wt: Admission Body Weight: 168 lb (76.2 kg)  Oral Diet Orders: Cardiac   Oral Nutrition Supplement (ONS) Orders: None   PO %  Please see nutrition note for details.     RECORD REVIEW: Previous medical records, medications were reviewed at today's visit    IMPRESSION:   1. Right thalamic stroke-PT/OT/SLP. Aspirin and risk factor modification. F/u CT unchanged 2/25. 2.  Parkinson's disease-continue Sinemet CR  3. Essential hypertension- Coreg resumed. 4. DVT prophylaxis-SCDs  5. GI-bowel regimen  6. Confusion. better  seroquel every night for now. Check urine to rule out UTI  ELOS: possibly 3/2.

## 2020-02-27 NOTE — PROGRESS NOTES
Occupational Therapy  Facility/Department: [facility] 8 REHAB UNIT  Daily Treatment Note  NAME: Vince Cabral  : 1945  MRN: [id number]    Date of Service: 2020    Discharge Recommendations:  Home with Home health OT       Assessment   Performance deficits / Impairments: Decreased functional mobility ; Decreased ADL status; Decreased ROM; Decreased strength;Decreased safe awareness;Decreased cognition;Decreased endurance;Decreased balance;Decreased vision/visual deficit; Decreased high-level IADLs;Decreased fine motor control;Decreased coordination  Treatment Diagnosis: Right CVA, left sided weakness   Activity Tolerance  Activity Tolerance: Patient Tolerated treatment well  Safety Devices  Safety Devices in place: Yes(Left with PT )  Type of devices: Left in chair         Patient Diagnosis(es): There were no encounter diagnoses. has a past medical history of Cancer ([facility] [address] Neuropathy, Parkinson disease ([facility] [address] BARBARA (stress urinary incontinence), male, and Vitamin B12 deficiency. has a past surgical history that includes Prostatectomy; Neck surgery; back surgery; and Mohs surgery. Restrictions  Restrictions/Precautions  Restrictions/Precautions: Fall Risk  Subjective   General  Chart Reviewed: Yes  Patient assessed for rehabilitation services?: Yes  Additional Pertinent Hx: Parkinson's disease, history of prostate cancer, peripheral neuropathy, hypertension, right frontal meningioma  Family / Caregiver Present: Yes  Diagnosis: Right CVA  Subjective  Subjective: Pt. reported dizziness, BP taken. General Comment  Comments: Pt. demonstrated a significant functional decline since previous tx session. Nursing notified of changes.       Objective    Balance  Sitting Balance: Stand by assistance  Standing Balance: Contact guard assistance  Standing Balance  Time: 3 minutes  Activity: ADL tasks   Functional Mobility  Functional - Mobility Device: Rolling Walker  Activity: To/from bathroom  Assist Level: Contact guard goal 7: pt will verbalize DME needs   Patient Goals   Patient goals : to return home        Therapy Time   Individual Concurrent Group Co-treatment   Time In 1000         Time Out 1100         Minutes 60         Timed Code Treatment Minutes: [address], OT

## 2020-02-28 LAB
BILIRUBIN URINE: NEGATIVE
BLOOD, URINE: NEGATIVE
CLARITY: CLEAR
COLOR: YELLOW
GLUCOSE URINE: NEGATIVE MG/DL
KETONES, URINE: NEGATIVE MG/DL
LEUKOCYTE ESTERASE, URINE: NEGATIVE
NITRITE, URINE: NEGATIVE
PH UA: 6 (ref 5–8)
PROTEIN UA: NEGATIVE MG/DL
SPECIFIC GRAVITY UA: 1.03 (ref 1–1.03)
URINE REFLEX TO CULTURE: NORMAL
UROBILINOGEN, URINE: 0.2 E.U./DL

## 2020-02-28 PROCEDURE — 97535 SELF CARE MNGMENT TRAINING: CPT

## 2020-02-28 PROCEDURE — 97129 THER IVNTJ 1ST 15 MIN: CPT

## 2020-02-28 PROCEDURE — 6370000000 HC RX 637 (ALT 250 FOR IP): Performed by: PSYCHIATRY & NEUROLOGY

## 2020-02-28 PROCEDURE — 97530 THERAPEUTIC ACTIVITIES: CPT

## 2020-02-28 PROCEDURE — 6370000000 HC RX 637 (ALT 250 FOR IP): Performed by: INTERNAL MEDICINE

## 2020-02-28 PROCEDURE — 97116 GAIT TRAINING THERAPY: CPT

## 2020-02-28 PROCEDURE — 1180000000 HC REHAB R&B

## 2020-02-28 PROCEDURE — 99232 SBSQ HOSP IP/OBS MODERATE 35: CPT | Performed by: PSYCHIATRY & NEUROLOGY

## 2020-02-28 PROCEDURE — 6360000002 HC RX W HCPCS: Performed by: PSYCHIATRY & NEUROLOGY

## 2020-02-28 PROCEDURE — 81003 URINALYSIS AUTO W/O SCOPE: CPT

## 2020-02-28 PROCEDURE — 97110 THERAPEUTIC EXERCISES: CPT

## 2020-02-28 PROCEDURE — 97130 THER IVNTJ EA ADDL 15 MIN: CPT

## 2020-02-28 RX ORDER — QUETIAPINE FUMARATE 25 MG/1
25 TABLET, FILM COATED ORAL NIGHTLY PRN
Status: DISCONTINUED | OUTPATIENT
Start: 2020-02-28 | End: 2020-03-05 | Stop reason: HOSPADM

## 2020-02-28 RX ADMIN — CARBIDOPA AND LEVODOPA 1 TABLET: 50; 200 TABLET, EXTENDED RELEASE ORAL at 08:14

## 2020-02-28 RX ADMIN — CARVEDILOL 3.12 MG: 3.12 TABLET, FILM COATED ORAL at 08:15

## 2020-02-28 RX ADMIN — CARBIDOPA AND LEVODOPA 1 TABLET: 50; 200 TABLET, EXTENDED RELEASE ORAL at 13:43

## 2020-02-28 RX ADMIN — ENOXAPARIN SODIUM 40 MG: 40 INJECTION SUBCUTANEOUS at 21:17

## 2020-02-28 RX ADMIN — LISINOPRIL 40 MG: 20 TABLET ORAL at 08:15

## 2020-02-28 RX ADMIN — CARBIDOPA AND LEVODOPA 1 TABLET: 50; 200 TABLET, EXTENDED RELEASE ORAL at 21:17

## 2020-02-28 RX ADMIN — CLOPIDOGREL BISULFATE 75 MG: 75 TABLET ORAL at 08:15

## 2020-02-28 RX ADMIN — CARVEDILOL 3.12 MG: 3.12 TABLET, FILM COATED ORAL at 17:08

## 2020-02-28 ASSESSMENT — PAIN SCALES - GENERAL
PAINLEVEL_OUTOF10: 0

## 2020-02-28 NOTE — PROGRESS NOTES
[facility]  INPATIENT SPEECH THERAPY  [facility] UNIT  TIME   [phone]    [x]Daily Note  []Progress Note    Date: 2020  Patient Name: Adalid Hung        MRN: [id number]    Account #: [de-identified]  : 1945  (68 y.o.)  Gender: male   Primary Provider: Suellen Combs MD  Precautions: Fall/aspiration   Swallowing Status/Diet: Regular diet with thin liquids      Subjective: Patient alert and cooperative with all therapy tasks. Daughter present. Objective: Patient feeding himself breakfast this am. Patient continues to tolerate current diet well. No overt s/s of aspiration noted. Patient also had medication during session. Patient tolerated taking meds whole with water. No difficulty noted. Patient oriented to time, place, biographical information today. Patient also able to recall it was a leap year. Patient recalling where his daughters lived which is improvement from previous sessions. Patient completed divergent naming task with topic he is familiar with. Patient completed with 100% accuracy. Patient did require extended time to complete this task. Delayed responses were noted. Patient required cues to stay on task and would get off topic during. Patient completed visuospatial/perceptual problem solving task today. Patient completed with 100% accuracy and min verbal cues at times. Extended time to complete also noted. Recall task was completed using a doctors appointment. Patient first wrote the details down and then was encouraged to utilize the memory strategy for repetition. With using this strategy was able to recall 4/5 details immediately and then after 5 minutes was able to recall the 4/5. SLP continues educate on compensatory strategies for memory. Discussed safety awareness for ADL's Patient was able to verbalize techniques for transfers, ambulation, and sit to stand with mod verbal cues. Will address further when completing during function tasks.      Patient continues to progress towards goals. SHORT TERM GOAL #1:  Goal 1: Patient will utilize compensatory strategies for short-term memory/recall tasks and during ADLs with minimal cues required to utilize. SHORT TERM GOAL #2:  Goal 2: Patient will complete problem solving/safety awareness tasks during functional ADL's in order to increase functional integration into the environment and decreased assistance from caregivers with 80% accuracy with minimal cues required to complete. SHORT TERM GOAL #3:  Goal 3: The patient will follow multi step commands related to functional living environment with 100% accuracy and min verbal cues in order to increase functional integration into environment. SHORT TERM GOAL #4:  Goal 4: The patient will complete simple/complex naming tasks to improve verbal expression and thought organization for effective communication in all settings with min verbal cues at 80% accuracy. SHORT TERM GOAL #5:  Goal 5: The patient will complete executive function tasks (organizing, planning, self monitoring) with 80% accuracy and min verbal cues during daily living activities to improve safety and awareness in functional living environment.        Comprehension: 4 - Patient understands basic needs 75-90%+ of the time  Expression: 4 - Expresses basic ideas/needs 75-90%+ of the time  Social Interaction: 4 - Patient appropriate 75-90%+ of the time  Problem Solving: 3 - Patient solves simple/routine tasks 50%-74%  Memory: 2 - Patient remembers 25%-49% of the time    ASSESSMENT:  Assessment: [x]Progressing towards goals          []Not Progressing towards goals    Patient Tolerance of Treatment:   [x]Tolerated well []Tolerated fair []Required rest breaks []Fatigued    Education:  Learner:  [x]Patient          []Significant Other          [x]Other  Education provided regarding:  [x]Goals and POC   []Diet and swallowing precautions    []Home Exercise Program  []Progress and/or discharge information  Method of

## 2020-02-28 NOTE — PROGRESS NOTES
02/28/20 1100   Restrictions/Precautions   Restrictions/Precautions Fall Risk   Pain Assessment   Pain Assessment 0-10   Pain Level 0   Patient's Stated Pain Goal No pain   Oxygen Therapy   O2 Device None (Room air)   Transfers   Sit to Stand Stand by assistance   Stand to sit Stand by assistance   Bed to Chair Stand by assistance;Contact guard assistance   Ambulation 1   Surface level tile   Device Rolling Walker   Other Apparatus   (.)   Assistance Contact guard assistance   Quality of Gait head down and slightly forward flexed, reciprocal pattern   Distance 300   Ambulation 2   Surface - 2 level tile   Device 2 Rolling Walker   Other Apparatus 2   (.)   Assistance 2 Stand by assistance   Distance 225   Comments VC TO STAY CLOSER TO RW AND TO SLOW RATE OF AMB. Exercises   Comments SEATED EX BLE'S 2 SETS 10 REPS MOD MANUAL RESIST.    Conditions Requiring Skilled Therapeutic Intervention   Assessment IMPROVED ENDURANCE, MORE ALERT, LESS C/O DIZZINESS   Activity Tolerance   Activity Tolerance Patient Tolerated treatment well

## 2020-02-28 NOTE — PROGRESS NOTES
Patient:   Myriam Adames  MR#:    [id number]   Room:    3061/010-72   YOB: 1945  Date of Progress Note: 2/28/2020  Time of Note                           8:14 AM  Consulting Physician:   Mildred Muñoz M.D. Attending Physician:  Mildred Muñoz MD     CHIEF COMPLAINT: Left-sided weakness, ataxia, dysarthria        Subjective  This 75 y.o. male  with history of Parkinson's disease followed by ,HTN,depression and right frontal meningioma that is followed by Dr. Malcom Jane as outpatient. He presented to [facility] on 2/16/20 with c/o unsteadiness of gain,left leg weakness and occasional changes in speech that started sometime during the night and continued throughout the day on Sunday. CT of head and CTA head/neck were negative. He was not a candidate for TPA d/t being out of the timeframe. He was admitted to the hospitalist service with consult for neurology. He was seen by neurologist Dr. Magali Minor, who felt he had had a right hemispheric stroke. MRI done confirmed a right thalamic stroke. He was supposed to be taking aspirin at home but did not take it routinely. Dr. Magali Minor placed him back on aspirin 325 mg a day and discontinued Plavix. Patient was evaluated by SPT and found to exhibit slow oral prep of more solid consistencies as well as sluggish, inconsistently mildly decreased laryngeal elevation for swallow airway protection. Patient is currently on a regular diet with thin liquids, medicines to be given in pudding or applesauce with assist for feeding. Patient also exhibited slow processing, delayed auditory comprehension, delayed verbal responses with mild-moderate fragmentations observed where the patient started expressions but then discontinued, and decreased high level reasoning/problem solving. Patient continues to have left sided weakness and had an assisted fall with staff on 2/18/20 bumping his left elbow on the door frame. X-ray of left elbow was negative.  Patient also started having c/o double vision & being lightheaded on 2/18/20. Repeat CT of head showed no changes. Echo done suggested PFO. Cardiology was consulted for ELIZABETH. He was seen by Dr. Rebekah Solano and went for ELIZABETH on 2/19/20. Doing much better. No further hallucinations. Slept 12 hours last night had negative urinalysis  REVIEW OF SYSTEMS:  Constitutional: No fevers No chills  Neck:No stiffness  Respiratory: No shortness of breath  Cardiovascular: No chest pain No palpitations  Gastrointestinal: No abdominal pain    Genitourinary: No Dysuria  Neurological: No headache, no seizures      PHYSICAL EXAM:  /81   Pulse 66   Temp 96.8 °F (36 °C) (Temporal)   Resp 18   Ht 6' (1.829 m)   Wt 168 lb (76.2 kg)   SpO2 90%   BMI 22.78 kg/m²     Constitutional: he appears well-developed and well-nourished. Eyes - conjunctiva normal.  Pupils react to light  Ear, nose, throat -hearing intact to voice. No scars, masses, or lesions over external nose or ears, no atrophy of tongue  Neck-symmetric, no masses noted, no jugular vein distension  Respiration- chest wall appears symmetric, good expansion,   normal effort without use of accessory muscles  Cardiovascular- RRR  Musculoskeletal - no significant wasting of muscles noted, no bony deformities, gait no gross ataxia  Extremities-no clubbing, cyanosis or edema  Skin - warm, dry, and intact. No rash, erythema, or pallor.   Psychiatric - mood, affect, and behavior appear normal.      Neurology  NEUROLOGICAL EXAM:      Mental status   Awake, alert, fluent oriented x 3 appropriate affect  Attention and concentration appear appropriate  Recent and remote memory appears unremarkable  Speech normal without dysarthria  No clear issues with language       Cranial Nerves   CN II- Visual fields grossly unremarkable  CN III, IV,VI-EOMI, No nystagmus, conjugate eye movements, no ptosis  CN VII-no facial asymmetry  CN VIII-Hearing intact      Motor function  Antigravity x 4     Sensory function Intact to light touch     Cerebellar F-N intact     Tremor None present     Gait                  Not tested           Nursing/pcp notes, imaging,labs and vitals reviewed. PT,OT and/or speech notes reviewed    Lab Results   Component Value Date    WBC 5.9 02/27/2020    HGB 13.9 (L) 02/27/2020    HCT 43.8 02/27/2020    MCV 88.7 02/27/2020     02/27/2020     Lab Results   Component Value Date     02/27/2020    K 3.6 02/27/2020     02/27/2020    CO2 23 02/27/2020    BUN 20 02/27/2020    CREATININE 0.9 02/27/2020    GLUCOSE 82 02/27/2020    CALCIUM 8.7 (L) 02/27/2020    PROT 7.7 02/16/2020    LABALBU 4.4 02/16/2020    BILITOT 0.4 02/16/2020    ALKPHOS 88 02/16/2020    AST 13 02/16/2020    ALT <5 (A) 02/16/2020    LABGLOM >60 02/27/2020     Lab Results   Component Value Date    INR 1.06 02/16/2020   No results found for: PHENYTOIN, ESR, CRP    PHYSICAL THERAPY  STRENGTH  Strength RLE  Comment: GROSSLY 4/5  Strength LLE  Comment: GROSSLY 4/5  ROM  AROM RLE (degrees)  RLE AROM: WFL  AROM LLE (degrees)  LLE AROM : WFL  BED MOBILITY  Bed Mobility  Rolling: Stand by assistance  Supine to Sit: Contact guard assistance  Sit to Supine: Stand by assistance  Scooting: Stand by assistance  Comment: Worked on bedside sitting, patient leaning backwards upon initial sit, cues to scoot out to edge of bed  TRANSFERS  Transfers  Sit to Stand: Minimal Assistance  Stand to sit: Minimal Assistance  Bed to Chair: Minimal assistance, Moderate assistance  Stand Pivot Transfers: Minimal Assistance, Moderate Assistance  Car Transfer: Contact guard assistance  Comment: Patient seems to have trouble processing instructions today. Difficulty figuring out which way to turn when transferring bed to wheelchair.    WHEELCHAIR PROPULSION  Propulsion 1  Propulsion: Manual  Level: Level Tile  Method: RUE, LUE, RLE, LLE  Level of Assistance: Contact guard assistance  Description/ Details: Pt can self correct path deviations, adequate velocity   Distance: 200ft(with turns )  AMBULATION  Ambulation 1  Surface: level tile  Device: Rolling Walker  Other Apparatus: Wheelchair follow  Assistance: Minimal assistance, 2 Person assistance(assist x 2 for safety due to patient's decline today)  Quality of Gait: Patient leaning slightly to the right, decreased step length and foot clearance with left LE. Patient with narrow DRAKE, close to scissoring, but never actually did, but patient is not aligned in middle of the walker, hugging the left side of the walker. Gait Deviations: Slow Neli, Decreased step length, Decreased step height, Deviated path  Distance: 200 feet  Comments: decreased B foot clearance and heel strike on R   STAIRS  Stairs  # Steps : 8  Stairs Height: 4\"  Rails: Bilateral  Assistance: Minimal assistance, Contact guard assistance  Comment: INITIALLY BEGAN RECIPROCAL ASCENSION/DESCENSION. NO PROBLEMS ASCENDING. LEFT KNEE BUCKLE DURING ATTEMPTED DESCENSION WITH RIGHT LE. CUED FOR PROPER STEP TO UP WITH RIGHT, DOWN WITH LEFT. GOALS:  Short term goals  Time Frame for Short term goals: 1wk  Short term goal 1: indep bed mobility   Short term goal 2: SBA transfers  Short term goal 3: SBA wc propulstion 150ft  Short term goal 4: SBA amb 150ft with least restrictive AD  Short term goal 5: SBA 1 stair      Long term goals  Time Frame for Long term goals : 2wks  Long term goal 1: indep transfers  Long term goal 2: indep wc propulsion 150ft  Long term goal 3: indep amb 150ft with least restrictive AD  Long term goal 4: SBA 4 stairs  Long term goal 5: indep amb 50ft on uneven surfaces     ASSESSMENT:  Assessment: Assisted patient back to bed and left with all needs in reach, several family members in room. Family asking patient many questions and most answers are appropriate and somewhat correct, delayed responses noted.  Still some confusion noted overall.         SPEECH THERAPY  Precautions: Fall  Swallowing Status/Diet: Regular diet with thin liquids        Subjective: Patient alert and cooperative with all therapy tasks. Patient continues to complain of dizziness.      Objective: Patient completed simple laundry task where he had to hang up shirt, button/zip shirts, and pair socks. Increased difficulty noted secondary to visual deficits and decreased fine motor skills. Some difficulty also noted with sequencing. Patient required extended time to complete task and verbal cues.      Patient using a rollator during ambulation tasks. Patient required max cues for locking/unlocking rollator brakes. Patient also noted to have increased difficulty maneuvering the rollator and turning to sit. Patient turning and ending up very far from his seat. Patient also noted to have 3 LOB during session. Patient continues to require max cues for safety when completing sit to stand/stand to sit.      Patient noted to have increased difficulty motor planning and following simple commands given by SLP and OT during structured and unstructured tasks.      Patient was oriented to time and place today with no difficulty. Patient does continue to have significant decreased short term recall. Patient has difficulty recalling recent/daily events and was unable to recall different tasks he had completed earlier today in therapy sessions.      SLP and patient reviewed memory strategies addressed in therapy session on Saturday (2/22/20). Patient required moderate cues to recall (repetition and using external aid). Utilized both strategies during immediate recall tasks when patient utilizes improvement was noted.      When SLP saw patient later in the day patient was able to recall some of the tasks completed in Co-tx session independently and then a few more after moderate verbal cues.       Reviewed use for call light.  Patient demonstrating understanding, however at times will try to get up on his own.          Note reflects 2/25/2020  Subjective: Patient presenting with much increased confusion, decreased alertness and much different than previous sessions.      Objective: Patient presenting with significant left inattention and decreased proprioception and visouspatial this is new from previous sessions. Patient required a feeder this morning with breakfast. Patient was unable to bring food to his mouth (reported by nursing staff). SLP reassessed swallow function today. Patient has been on regular diet consistency. Due to increased confusion and decreased alertness recommend downgrade to pureed consistency. Patient required cues to open mouth and was noted to begin chewing puree. No residue was noted post swallow with puree nor any s/s of aspiration. Thin liquids administered via cup. No overt s/s of aspiration noted today. Will monitor swallow function and will have nursing staff also supervise meals.      Patient was able to follow simple 1 step commands at times, however inconsistent and very delayed processing noted. Significant decreased in initiation for conversation and during simple tasks. Patient has been oriented to place and time in previous sessions. Patient was not oriented to place, situation, time, or biographical information.      Attempted a simple task where patient had to match the word to object. Task was discontinued because it was to difficult for patient to follow. Naming simple objects completed to address word finding and verbal expression. Patient naming 3/6 items correctly. This is new from previous session. Semantic paraphasias noted.      Significantly decreased thought organization in comparison to previous sessions.      Patient was walking with rollator, and walker yesterday. Today patient required max assistance/total assistance for transfers from wheelchair to recliner.      Patient had significant decline from yesterdays session.  Nursing notified of changes.     SHORT TERM GOAL #1:  Goal 1: Patient will utilize compensatory strategies for short-term memory/recall tasks and during ADLs with minimal cues required to utilize.      SHORT TERM GOAL #2:  Goal 2: Patient will complete problem solving/safety awareness tasks during functional ADL's in order to increase functional integration into the environment and decreased assistance from caregivers with 80% accuracy with minimal cues required to complete.     SHORT TERM GOAL #3:  Goal 3: The patient will follow multi step commands related to functional living environment with 100% accuracy and min verbal cues in order to increase functional integration into environment.      SHORT TERM GOAL #4:  Goal 4: The patient will complete simple/complex naming tasks to improve verbal expression and thought organization for effective communication in all settings with min verbal cues at 80% accuracy.      SHORT TERM GOAL #5:  Goal 5: The patient will complete executive function tasks (organizing, planning, self monitoring) with 80% accuracy and min verbal cues during daily living activities to improve safety and awareness in functional living environment.      Long-term Goals  Goal 1: The patient will comprehend communication related to basic medical and social needs and utilize compensatory strategies to maintain safety and participate socially in functional living environment. Goal 2: The patient will develop functional cognitive linguistic based skills and utilize compensatory strategies to communicate wants and needs effectively to different conversational partners, maintain safety and participate socially in functional living environment.         Comprehension: 4 - Patient understands basic needs 75-90%+ of the time  Expression: 4 - Expresses basic ideas/needs 75-90%+ of the time  Social Interaction: 5 - Patient is appropriate with supervision/cues  Problem Solvin - Patient solves simple/routine tasks 75-90%+   Memory: 3 - Patient remembers 50%-74% of the time     ASSESSMENT:  Assessment: [x]? ?Progressing vision/visual deficit; Decreased high-level IADLs; Decreased fine motor control; Decreased coordination                    NUTRITION  Current Wt: Weight: 168 lb (76.2 kg) / Body mass index is 22.78 kg/m². Admission Wt: Admission Body Weight: 168 lb (76.2 kg)  Oral Diet Orders: Cardiac   Oral Nutrition Supplement (ONS) Orders: None   PO %  Please see nutrition note for details.     RECORD REVIEW: Previous medical records, medications were reviewed at today's visit    IMPRESSION:   1. Right thalamic stroke-PT/OT/SLP. Aspirin and risk factor modification. F/u CT unchanged 2/25. 2.  Parkinson's disease-continue Sinemet CR  3. Essential hypertension- Coreg resumed. 4. DVT prophylaxis-SCDs  5. GI-bowel regimen  6. Confusion. better  Change Seroquel to as needed for agitation  ELOS: possibly 3/2.

## 2020-02-28 NOTE — PLAN OF CARE
Daily Care:  Goal: Daily care needs are met  Description  Daily care needs are met  2/28/2020 1044 by Michael Vora LPN  Outcome: Ongoing  2/28/2020 0122 by Silverio Ramos LPN  Outcome: Ongoing     Problem: Pain:  Goal: Patient's pain/discomfort is manageable  Description  Patient's pain/discomfort is manageable  2/28/2020 1044 by Michael Vora LPN  Outcome: Ongoing  2/28/2020 0122 by Silverio Ramos LPN  Outcome: Ongoing  Goal: Pain level will decrease  Description  Pain level will decrease  2/28/2020 1044 by Michael Vora LPN  Outcome: Ongoing  2/28/2020 0122 by Silverio Ramos LPN  Outcome: Ongoing  Goal: Control of acute pain  Description  Control of acute pain  2/28/2020 1044 by Michael Vora LPN  Outcome: Ongoing  2/28/2020 0122 by Silverio Ramos LPN  Outcome: Ongoing  Goal: Control of chronic pain  Description  Control of chronic pain  2/28/2020 1044 by Michael Vora LPN  Outcome: Ongoing  2/28/2020 0122 by Silverio Ramos LPN  Outcome: Ongoing     Problem: Skin Integrity:  Goal: Skin integrity will stabilize  Description  Skin integrity will stabilize  2/28/2020 1044 by Michael Vora LPN  Outcome: Ongoing  2/28/2020 0122 by Silverio Ramos LPN  Outcome: Ongoing     Problem: Discharge Planning:  Goal: Patients continuum of care needs are met  Description  Patients continuum of care needs are met  2/28/2020 1044 by Michael Vora LPN  Outcome: Ongoing  2/28/2020 0122 by Silverio Ramos LPN  Outcome: Ongoing     Problem: IP BLADDER/VOIDING  Goal: LTG - Patient will utilize adaptive techniques/equipment to complete bladder elimination  2/28/2020 1044 by Michael Vora LPN  Outcome: Ongoing  2/28/2020 0122 by Silverio Ramos LPN  Outcome: Ongoing     Problem: IP BOWEL ELIMINATION  Goal: LTG - patient will have regular and routine bowel evacuation  2/28/2020 1044 by Michael Vora LPN  Outcome: Ongoing  2/28/2020 0122 by Silverio Ramos LPN  Outcome: Ongoing Problem: IP BREATHING  Goal: LTG - Patient/caregiver will demonstrate/perform proper techniques to maintain patent airway  2/28/2020 1044 by Twyla Bryant LPN  Outcome: Ongoing  2/28/2020 0122 by Cheryl Rosales LPN  Outcome: Ongoing     Problem: NUTRITION  Goal: Patient maintains adequate hydration  2/28/2020 1044 by Twyla Bryant LPN  Outcome: Ongoing  2/28/2020 0122 by Cheryl Rosales LPN  Outcome: Ongoing     Problem: SAFETY  Goal: LTG - Patient will demonstrate safety requirements appropriate to situation/environment  2/28/2020 1044 by Twyla Bryant LPN  Outcome: Ongoing  2/28/2020 0122 by Cheryl Rosales LPN  Outcome: Ongoing  Goal: LTG - patient will utilize safety techniques  2/28/2020 1044 by Twyla Bryant LPN  Outcome: Ongoing  2/28/2020 0122 by Cheryl Rosales LPN  Outcome: Ongoing  Goal: STG - patient locks brakes on wheelchair  2/28/2020 1044 by Twyla Bryant LPN  Outcome: Ongoing  2/28/2020 0122 by Cheryl Rosales LPN  Outcome: Ongoing  Goal: STG - Patient uses call light consistently to request assistance with transfers  2/28/2020 1044 by Twyla Bryant LPN  Outcome: Ongoing  2/28/2020 0122 by Cheryl Rosales LPN  Outcome: Ongoing  Goal: STG - patient uses gait belt during all transfers  2/28/2020 1044 by Twyla Bryant LPN  Outcome: Ongoing  2/28/2020 0122 by Cheryl Rosales LPN  Outcome: Ongoing     Problem: SKIN INTEGRITY  Goal: LTG - patient will maintain/improve skin integrity through proper skin care techniques  2/28/2020 1044 by Twyla Bryant LPN  Outcome: Ongoing  2/28/2020 0122 by Cheryl Rosales LPN  Outcome: Ongoing  Goal: LTG - Patient will demonstrate appropriate pressure relief techniques  2/28/2020 1044 by Twyla Bryant LPN  Outcome: Ongoing  2/28/2020 0122 by Cheryl Rosales LPN  Outcome: Ongoing  Goal: LTG - patient will demonstrate appropriate skin care techniques  2/28/2020 1044 by Twyla Bryant LPN  Outcome: Ongoing  2/28/2020 0122 by Yola Fuentes LPN  Outcome: Ongoing  Goal: LTG - Patient will be free from infection  2/28/2020 1044 by Ksenia Aguilera LPN  Outcome: Ongoing  2/28/2020 0122 by Yola Fuentes LPN  Outcome: Ongoing  Goal: STG - Patient demonstrates skin care/treatment/dressing change  2/28/2020 1044 by Ksenia Aguilera LPN  Outcome: Ongoing  2/28/2020 0122 by Yola Fuentes LPN  Outcome: Ongoing  Goal: STG - patient will maintain good skin integrity  2/28/2020 1044 by Ksenia Aguilera LPN  Outcome: Ongoing  2/28/2020 0122 by Yola Fuentes LPN  Outcome: Ongoing  Goal: STG - Patient exhibits signs of wound healing.  2/28/2020 1044 by Ksenia Aguilera LPN  Outcome: Ongoing  2/28/2020 0122 by Yola Fuentes LPN  Outcome: Ongoing  Goal: STG - patient demonstrates pressure reduction techniques  2/28/2020 1044 by Ksenia Aguilera LPN  Outcome: Ongoing  2/28/2020 0122 by Yola Fuentes LPN  Outcome: Ongoing  Goal: STG - Patient demonstrates preventative skin care measures  2/28/2020 1044 by Ksenia Aguilera LPN  Outcome: Ongoing  2/28/2020 0122 by Yola Fuentes LPN  Outcome: Ongoing     Problem: PAIN  Goal: STG - Patient will verbalize an acceptable level of pain  2/28/2020 1044 by Ksenia Aguilera LPN  Outcome: Ongoing  2/28/2020 0122 by Yola Fuentes LPN  Outcome: Ongoing  Goal: STG - patients pain is managed to allow active participation in daily activities  2/28/2020 1044 by Ksenia Aguilera LPN  Outcome: Ongoing  2/28/2020 0122 by Yola Fuentes LPN  Outcome: Ongoing     Problem: Coping:  Goal: Ability to verbalize frustrations and anger appropriately will improve  Description  Ability to verbalize frustrations and anger appropriately will improve  2/28/2020 1044 by Ksenia Aguilera LPN  Outcome: Ongoing  2/28/2020 0122 by Yola Fuentes LPN  Outcome: Ongoing     Problem: Health Behavior:  Goal: Ability to verbalize precipitating factors for violent behavior will

## 2020-02-29 PROBLEM — G47.09 OTHER INSOMNIA: Status: ACTIVE | Noted: 2020-02-29

## 2020-02-29 PROBLEM — R53.1 WEAKNESS: Status: ACTIVE | Noted: 2020-02-29

## 2020-02-29 PROBLEM — R41.0 CONFUSION: Status: ACTIVE | Noted: 2020-02-29

## 2020-02-29 PROCEDURE — 6370000000 HC RX 637 (ALT 250 FOR IP): Performed by: INTERNAL MEDICINE

## 2020-02-29 PROCEDURE — 99232 SBSQ HOSP IP/OBS MODERATE 35: CPT | Performed by: PSYCHIATRY & NEUROLOGY

## 2020-02-29 PROCEDURE — 6370000000 HC RX 637 (ALT 250 FOR IP): Performed by: PSYCHIATRY & NEUROLOGY

## 2020-02-29 PROCEDURE — 6360000002 HC RX W HCPCS: Performed by: PSYCHIATRY & NEUROLOGY

## 2020-02-29 PROCEDURE — 1180000000 HC REHAB R&B

## 2020-02-29 RX ADMIN — ACETAMINOPHEN 650 MG: 325 TABLET ORAL at 10:33

## 2020-02-29 RX ADMIN — CARBIDOPA AND LEVODOPA 1 TABLET: 50; 200 TABLET, EXTENDED RELEASE ORAL at 12:28

## 2020-02-29 RX ADMIN — ENOXAPARIN SODIUM 40 MG: 40 INJECTION SUBCUTANEOUS at 21:07

## 2020-02-29 RX ADMIN — CARBIDOPA AND LEVODOPA 1 TABLET: 50; 200 TABLET, EXTENDED RELEASE ORAL at 09:01

## 2020-02-29 RX ADMIN — CARBIDOPA AND LEVODOPA 1 TABLET: 50; 200 TABLET, EXTENDED RELEASE ORAL at 21:07

## 2020-02-29 RX ADMIN — CARVEDILOL 3.12 MG: 3.12 TABLET, FILM COATED ORAL at 09:01

## 2020-02-29 RX ADMIN — LISINOPRIL 40 MG: 20 TABLET ORAL at 09:01

## 2020-02-29 RX ADMIN — CARVEDILOL 3.12 MG: 3.12 TABLET, FILM COATED ORAL at 17:54

## 2020-02-29 RX ADMIN — CLOPIDOGREL BISULFATE 75 MG: 75 TABLET ORAL at 09:01

## 2020-02-29 ASSESSMENT — PAIN SCALES - GENERAL
PAINLEVEL_OUTOF10: 3
PAINLEVEL_OUTOF10: 0

## 2020-02-29 NOTE — PROGRESS NOTES
02/27/2020    HGB 13.9 (L) 02/27/2020    HCT 43.8 02/27/2020    MCV 88.7 02/27/2020     02/27/2020     Lab Results   Component Value Date     02/27/2020    K 3.6 02/27/2020     02/27/2020    CO2 23 02/27/2020    BUN 20 02/27/2020    CREATININE 0.9 02/27/2020    GLUCOSE 82 02/27/2020    CALCIUM 8.7 (L) 02/27/2020    PROT 7.7 02/16/2020    LABALBU 4.4 02/16/2020    BILITOT 0.4 02/16/2020    ALKPHOS 88 02/16/2020    AST 13 02/16/2020    ALT <5 (A) 02/16/2020    LABGLOM >60 02/27/2020     Lab Results   Component Value Date    INR 1.06 02/16/2020   No results found for: PHENYTOIN, ESR, CRP    PHYSICAL THERAPY  STRENGTH  Strength RLE  Comment: GROSSLY 4/5  Strength LLE  Comment: GROSSLY 4/5  ROM  AROM RLE (degrees)  RLE AROM: WFL  AROM LLE (degrees)  LLE AROM : WFL  BED MOBILITY  Bed Mobility  Rolling: Stand by assistance  Supine to Sit: Contact guard assistance  Sit to Supine: Stand by assistance  Scooting: Stand by assistance  Comment: Worked on bedside sitting, patient leaning backwards upon initial sit, cues to scoot out to edge of bed  TRANSFERS  Transfers  Sit to Stand: Minimal Assistance  Stand to sit: Minimal Assistance  Bed to Chair: Minimal assistance, Moderate assistance  Stand Pivot Transfers: Minimal Assistance, Moderate Assistance  Car Transfer: Contact guard assistance  Comment: Patient seems to have trouble processing instructions today. Difficulty figuring out which way to turn when transferring bed to wheelchair.    WHEELCHAIR PROPULSION  Propulsion 1  Propulsion: Manual  Level: Level Tile  Method: RUE, LUE, RLE, LLE  Level of Assistance: Contact guard assistance  Description/ Details: Pt can self correct path deviations, adequate velocity   Distance: 200ft(with turns )  AMBULATION  Ambulation 1  Surface: level tile  Device: Rolling Walker  Other Apparatus: Wheelchair follow  Assistance: Minimal assistance, 2 Person assistance(assist x 2 for safety due to patient's decline today)  Quality of Gait: Patient leaning slightly to the right, decreased step length and foot clearance with left LE. Patient with narrow DRAKE, close to scissoring, but never actually did, but patient is not aligned in middle of the walker, hugging the left side of the walker. Gait Deviations: Slow Neli, Decreased step length, Decreased step height, Deviated path  Distance: 200 feet  Comments: decreased B foot clearance and heel strike on R   STAIRS  Stairs  # Steps : 8  Stairs Height: 4\"  Rails: Bilateral  Assistance: Minimal assistance, Contact guard assistance  Comment: INITIALLY BEGAN RECIPROCAL ASCENSION/DESCENSION. NO PROBLEMS ASCENDING. LEFT KNEE BUCKLE DURING ATTEMPTED DESCENSION WITH RIGHT LE. CUED FOR PROPER STEP TO UP WITH RIGHT, DOWN WITH LEFT. GOALS:  Short term goals  Time Frame for Short term goals: 1wk  Short term goal 1: indep bed mobility   Short term goal 2: SBA transfers  Short term goal 3: SBA wc propulstion 150ft  Short term goal 4: SBA amb 150ft with least restrictive AD  Short term goal 5: SBA 1 stair      Long term goals  Time Frame for Long term goals : 2wks  Long term goal 1: indep transfers  Long term goal 2: indep wc propulsion 150ft  Long term goal 3: indep amb 150ft with least restrictive AD  Long term goal 4: SBA 4 stairs  Long term goal 5: indep amb 50ft on uneven surfaces     ASSESSMENT:  Assessment: Assisted patient back to bed and left with all needs in reach, several family members in room. Family asking patient many questions and most answers are appropriate and somewhat correct, delayed responses noted. Still some confusion noted overall.         SPEECH THERAPY  Precautions: Fall  Swallowing Status/Diet: Regular diet with thin liquids        Subjective: Patient alert and cooperative with all therapy tasks. Patient continues to complain of dizziness.      Objective: Patient completed simple laundry task where he had to hang up shirt, button/zip shirts, and pair socks. Increased difficulty noted secondary to visual deficits and decreased fine motor skills. Some difficulty also noted with sequencing. Patient required extended time to complete task and verbal cues.      Patient using a rollator during ambulation tasks. Patient required max cues for locking/unlocking rollator brakes. Patient also noted to have increased difficulty maneuvering the rollator and turning to sit. Patient turning and ending up very far from his seat. Patient also noted to have 3 LOB during session. Patient continues to require max cues for safety when completing sit to stand/stand to sit.      Patient noted to have increased difficulty motor planning and following simple commands given by SLP and OT during structured and unstructured tasks.      Patient was oriented to time and place today with no difficulty. Patient does continue to have significant decreased short term recall. Patient has difficulty recalling recent/daily events and was unable to recall different tasks he had completed earlier today in therapy sessions.      SLP and patient reviewed memory strategies addressed in therapy session on Saturday (2/22/20). Patient required moderate cues to recall (repetition and using external aid). Utilized both strategies during immediate recall tasks when patient utilizes improvement was noted.      When SLP saw patient later in the day patient was able to recall some of the tasks completed in Co-tx session independently and then a few more after moderate verbal cues.       Reviewed use for call light. Patient demonstrating understanding, however at times will try to get up on his own.          Note reflects 2/25/2020  Subjective: Patient presenting with much increased confusion, decreased alertness and much different than previous sessions.      Objective: Patient presenting with significant left inattention and decreased proprioception and visouspatial this is new from previous sessions.  Patient integration into the environment and decreased assistance from caregivers with 80% accuracy with minimal cues required to complete.     SHORT TERM GOAL #3:  Goal 3: The patient will follow multi step commands related to functional living environment with 100% accuracy and min verbal cues in order to increase functional integration into environment.      SHORT TERM GOAL #4:  Goal 4: The patient will complete simple/complex naming tasks to improve verbal expression and thought organization for effective communication in all settings with min verbal cues at 80% accuracy.      SHORT TERM GOAL #5:  Goal 5: The patient will complete executive function tasks (organizing, planning, self monitoring) with 80% accuracy and min verbal cues during daily living activities to improve safety and awareness in functional living environment.      Long-term Goals  Goal 1: The patient will comprehend communication related to basic medical and social needs and utilize compensatory strategies to maintain safety and participate socially in functional living environment. Goal 2: The patient will develop functional cognitive linguistic based skills and utilize compensatory strategies to communicate wants and needs effectively to different conversational partners, maintain safety and participate socially in functional living environment.         Comprehension: 4 - Patient understands basic needs 75-90%+ of the time  Expression: 4 - Expresses basic ideas/needs 75-90%+ of the time  Social Interaction: 5 - Patient is appropriate with supervision/cues  Problem Solvin - Patient solves simple/routine tasks 75-90%+   Memory: 3 - Patient remembers 50%-74% of the time     ASSESSMENT:  Assessment: [x]? ?Progressing towards goals           []? ? Not Progressing towards goals     GOALS MET:   0  STG 0   LTG     OCCUPATIONAL THERAPY     CURRENT IRF-NIKKY SCORES  Eating: CARE Score: 6  Oral Hygiene: CARE Score: 4  Toileting: CARE Score: 4  Shower/Bathe: CARE Score: 4  Upper Body Dressing: CARE Score: 5  Lower Body Dressing: CARE Score: 4  Footwear: CARE Score: 5  Toilet Transfers: CARE Score: 4  Picking Up Object:  CARE Score: 2      scores above do not reflect current functional ability. Patient has experienced a decline. Scores above reflect ability prior to decline.     UE Functioning:  BUE AROM WFL      Pain Assessment:  Pain Level: 0  Pain Location: Wrist     STGs:  Short term goals  Time Frame for Short term goals: 1 week   Short term goal 1: pt will complete overall toileting with supervision   Short term goal 2: pt will complete overall bathing with supervision   Short term goal 3: MET  Short term goal 4: pt will complete overall lower body dressing with supervision   Short term goal 5: pt will complete toilet transfers with supervision   Short term goal 6: pt will complete 5 bilateral strengthening acts to increase overall strength      LTGs:  Long term goals  Time Frame for Long term goals : 2 weeks   Long term goal 1: pt will complete overall toileting with modified independence  Long term goal 2: pt will complete overall bathing with modified independence  Long term goal 3: pt will complete overall upper body dressing with modified independence   Long term goal 4: pt will complete overall lower body dressing with modified independence   Long term goal 5: pt will complete toilet transfers with modified independence   Long term goals 6: pt will complete HEP independently   Long term goal 7: pt will verbalize DME needs      Assessment:  Decreased functional mobility ; Decreased ADL status; Decreased ROM; Decreased strength; Decreased safe awareness; Decreased cognition; Decreased endurance; Decreased balance; Decreased vision/visual deficit; Decreased high-level IADLs; Decreased fine motor control; Decreased coordination                    NUTRITION  Current Wt: Weight: 168 lb (76.2 kg) / Body mass index is 22.78 kg/m².   Admission Wt: Admission Body Weight: 168 lb (76.2 kg)  Oral Diet Orders: Cardiac   Oral Nutrition Supplement (ONS) Orders: None   PO %  Please see nutrition note for details.     RECORD REVIEW: Previous medical records, medications were reviewed at today's visit    IMPRESSION:   1. Right thalamic stroke-PT/OT/SLP. Aspirin and risk factor modification. F/u CT unchanged 2/25. 2.  Parkinson's disease-continue Sinemet CR  3. Essential hypertension- Coreg resumed. 4. DVT prophylaxis-SCDs  5. GI-bowel regimen  6. Confusion. better  Change Seroquel to as needed for agitation  ELOS: possibly 3/2.     Continue care

## 2020-03-01 PROCEDURE — 6360000002 HC RX W HCPCS: Performed by: PSYCHIATRY & NEUROLOGY

## 2020-03-01 PROCEDURE — 99232 SBSQ HOSP IP/OBS MODERATE 35: CPT | Performed by: PSYCHIATRY & NEUROLOGY

## 2020-03-01 PROCEDURE — 1180000000 HC REHAB R&B

## 2020-03-01 PROCEDURE — 6370000000 HC RX 637 (ALT 250 FOR IP): Performed by: PSYCHIATRY & NEUROLOGY

## 2020-03-01 PROCEDURE — 6370000000 HC RX 637 (ALT 250 FOR IP): Performed by: INTERNAL MEDICINE

## 2020-03-01 RX ORDER — CARVEDILOL 3.12 MG/1
6.25 TABLET ORAL 2 TIMES DAILY WITH MEALS
Status: DISCONTINUED | OUTPATIENT
Start: 2020-03-01 | End: 2020-03-05 | Stop reason: HOSPADM

## 2020-03-01 RX ADMIN — CARBIDOPA AND LEVODOPA 1 TABLET: 50; 200 TABLET, EXTENDED RELEASE ORAL at 14:20

## 2020-03-01 RX ADMIN — ENOXAPARIN SODIUM 40 MG: 40 INJECTION SUBCUTANEOUS at 19:22

## 2020-03-01 RX ADMIN — ACETAMINOPHEN 650 MG: 325 TABLET ORAL at 19:22

## 2020-03-01 RX ADMIN — CARBIDOPA AND LEVODOPA 1 TABLET: 50; 200 TABLET, EXTENDED RELEASE ORAL at 19:22

## 2020-03-01 RX ADMIN — CARBIDOPA AND LEVODOPA 1 TABLET: 50; 200 TABLET, EXTENDED RELEASE ORAL at 08:22

## 2020-03-01 RX ADMIN — CARVEDILOL 6.25 MG: 3.12 TABLET, FILM COATED ORAL at 18:15

## 2020-03-01 RX ADMIN — CLOPIDOGREL BISULFATE 75 MG: 75 TABLET ORAL at 08:22

## 2020-03-01 RX ADMIN — LISINOPRIL 40 MG: 20 TABLET ORAL at 08:21

## 2020-03-01 RX ADMIN — CARVEDILOL 3.12 MG: 3.12 TABLET, FILM COATED ORAL at 08:22

## 2020-03-01 ASSESSMENT — PAIN SCALES - GENERAL
PAINLEVEL_OUTOF10: 0
PAINLEVEL_OUTOF10: 0

## 2020-03-01 NOTE — PROGRESS NOTES
c/o double vision & being lightheaded on 2/18/20. Repeat CT of head showed no changes. Echo done suggested PFO. Cardiology was consulted for ELIZABETH. He was seen by Dr. Gene Wisdom and went for ELIZABETH on 2/19/20. No new complaints. REVIEW OF SYSTEMS:  Constitutional: No fevers No chills  Neck:No stiffness  Respiratory: No shortness of breath  Cardiovascular: No chest pain No palpitations  Gastrointestinal: No abdominal pain    Genitourinary: No Dysuria  Neurological: No headache, no seizures      PHYSICAL EXAM:  BP (!) 157/84   Pulse 67   Temp 97.7 °F (36.5 °C) (Temporal)   Resp 18   Ht 6' (1.829 m)   Wt 168 lb (76.2 kg)   SpO2 93%   BMI 22.78 kg/m²     Constitutional - well developed, well nourished. Eyes - conjunctiva normal.   Ear, nose, throat - No scars, masses, or lesions over external nose or ears, no atrophy of tongue  Neck-symmetric, no masses noted, no jugular vein distension  Respiration- chest wall appears symmetric, good expansion,   normal effort without use of accessory muscles  Musculoskeletal - no significant wasting of muscles noted, no bony deformities  Extremities-no clubbing, cyanosis or edema  Skin - warm, dry, and intact. No rash, erythema, or pallor. Psychiatric - mood, affect, and behavior appear normal.      Neurological exam  Awake, alert, fluent oriented appropriate affect  Attention and concentration appear appropriate  Recent and remote memory appears unremarkable  Speech normal without dysarthria  No clear issues with language of fund of knowledge     Cranial Nerve Exam     CN III, IV,VI-EOMI, No nystagmus, conjugate eye movements, no ptosis    CN VII-no facial assymetry       Motor Exam  antigravity throughout upper and lower extremities bilaterally      Tremors- no tremors in hands or head noted     Gait  Not tested    Nursing/pcp notes, imaging,labs and vitals reviewed.      PT,OT and/or speech notes reviewed    Lab Results   Component Value Date    WBC 5.9 02/27/2020    HGB secondary to visual deficits and decreased fine motor skills. Some difficulty also noted with sequencing. Patient required extended time to complete task and verbal cues.      Patient using a rollator during ambulation tasks. Patient required max cues for locking/unlocking rollator brakes. Patient also noted to have increased difficulty maneuvering the rollator and turning to sit. Patient turning and ending up very far from his seat. Patient also noted to have 3 LOB during session. Patient continues to require max cues for safety when completing sit to stand/stand to sit.      Patient noted to have increased difficulty motor planning and following simple commands given by SLP and OT during structured and unstructured tasks.      Patient was oriented to time and place today with no difficulty. Patient does continue to have significant decreased short term recall. Patient has difficulty recalling recent/daily events and was unable to recall different tasks he had completed earlier today in therapy sessions.      SLP and patient reviewed memory strategies addressed in therapy session on Saturday (2/22/20). Patient required moderate cues to recall (repetition and using external aid). Utilized both strategies during immediate recall tasks when patient utilizes improvement was noted.      When SLP saw patient later in the day patient was able to recall some of the tasks completed in Co-tx session independently and then a few more after moderate verbal cues.       Reviewed use for call light. Patient demonstrating understanding, however at times will try to get up on his own.          Note reflects 2/25/2020  Subjective: Patient presenting with much increased confusion, decreased alertness and much different than previous sessions.      Objective: Patient presenting with significant left inattention and decreased proprioception and visouspatial this is new from previous sessions.  Patient required a feeder this morning decreased assistance from caregivers with 80% accuracy with minimal cues required to complete.     SHORT TERM GOAL #3:  Goal 3: The patient will follow multi step commands related to functional living environment with 100% accuracy and min verbal cues in order to increase functional integration into environment.      SHORT TERM GOAL #4:  Goal 4: The patient will complete simple/complex naming tasks to improve verbal expression and thought organization for effective communication in all settings with min verbal cues at 80% accuracy.      SHORT TERM GOAL #5:  Goal 5: The patient will complete executive function tasks (organizing, planning, self monitoring) with 80% accuracy and min verbal cues during daily living activities to improve safety and awareness in functional living environment.      Long-term Goals  Goal 1: The patient will comprehend communication related to basic medical and social needs and utilize compensatory strategies to maintain safety and participate socially in functional living environment. Goal 2: The patient will develop functional cognitive linguistic based skills and utilize compensatory strategies to communicate wants and needs effectively to different conversational partners, maintain safety and participate socially in functional living environment.         Comprehension: 4 - Patient understands basic needs 75-90%+ of the time  Expression: 4 - Expresses basic ideas/needs 75-90%+ of the time  Social Interaction: 5 - Patient is appropriate with supervision/cues  Problem Solvin - Patient solves simple/routine tasks 75-90%+   Memory: 3 - Patient remembers 50%-74% of the time     ASSESSMENT:  Assessment: [x]? ?Progressing towards goals           []? ? Not Progressing towards goals     GOALS MET:   0  STG 0   LTG     OCCUPATIONAL THERAPY     CURRENT IRF-NIKKY SCORES  Eating: CARE Score: 6  Oral Hygiene: CARE Score: 4  Toileting: CARE Score: 4  Shower/Bathe: CARE Score: 4  Upper Body Dressing: CARE Score: 5  Lower Body Dressing: CARE Score: 4  Footwear: CARE Score: 5  Toilet Transfers: CARE Score: 4  Picking Up Object:  CARE Score: 2      scores above do not reflect current functional ability. Patient has experienced a decline. Scores above reflect ability prior to decline.     UE Functioning:  BUE AROM WFL      Pain Assessment:  Pain Level: 0  Pain Location: Wrist     STGs:  Short term goals  Time Frame for Short term goals: 1 week   Short term goal 1: pt will complete overall toileting with supervision   Short term goal 2: pt will complete overall bathing with supervision   Short term goal 3: MET  Short term goal 4: pt will complete overall lower body dressing with supervision   Short term goal 5: pt will complete toilet transfers with supervision   Short term goal 6: pt will complete 5 bilateral strengthening acts to increase overall strength      LTGs:  Long term goals  Time Frame for Long term goals : 2 weeks   Long term goal 1: pt will complete overall toileting with modified independence  Long term goal 2: pt will complete overall bathing with modified independence  Long term goal 3: pt will complete overall upper body dressing with modified independence   Long term goal 4: pt will complete overall lower body dressing with modified independence   Long term goal 5: pt will complete toilet transfers with modified independence   Long term goals 6: pt will complete HEP independently   Long term goal 7: pt will verbalize DME needs      Assessment:  Decreased functional mobility ; Decreased ADL status; Decreased ROM; Decreased strength; Decreased safe awareness; Decreased cognition; Decreased endurance; Decreased balance; Decreased vision/visual deficit; Decreased high-level IADLs; Decreased fine motor control; Decreased coordination                    NUTRITION  Current Wt: Weight: 168 lb (76.2 kg) / Body mass index is 22.78 kg/m².   Admission Wt: Admission Body Weight: 168 lb (76.2 kg)  Oral Diet Orders: Cardiac   Oral Nutrition Supplement (ONS) Orders: None   PO %  Please see nutrition note for details.     RECORD REVIEW: Previous medical records, medications were reviewed at today's visit    IMPRESSION:   1. Right thalamic stroke-PT/OT/SLP. Aspirin and risk factor modification. F/u CT unchanged 2/25. 2.  Parkinson's disease-continue Sinemet CR  3. Essential hypertension- Coreg resumed. 4. DVT prophylaxis-SCDs  5. GI-bowel regimen  6. Confusion.   better  Change Seroquel to as needed for agitation  ELOS:3/5    Continue care as noted

## 2020-03-01 NOTE — PLAN OF CARE
techniques to maintain patent airway  2/29/2020 2356 by Peewee Pena LPN  Outcome: Ongoing  2/29/2020 1609 by Manuelito Shirley RN  Outcome: Ongoing     Problem: NUTRITION  Goal: Patient maintains adequate hydration  2/29/2020 2356 by Peewee Pena LPN  Outcome: Ongoing  2/29/2020 1609 by Manuelito Shirley RN  Outcome: Ongoing     Problem: SAFETY  Goal: LTG - Patient will demonstrate safety requirements appropriate to situation/environment  2/29/2020 2356 by Peewee Pena LPN  Outcome: Ongoing  2/29/2020 1609 by Manuelito Shirley RN  Outcome: Ongoing  Goal: LTG - patient will utilize safety techniques  2/29/2020 2356 by Peewee Pena LPN  Outcome: Ongoing  2/29/2020 1609 by Manuelito Shirley RN  Outcome: Ongoing  Goal: STG - patient locks brakes on wheelchair  2/29/2020 2356 by Peewee Pena LPN  Outcome: Ongoing  2/29/2020 1609 by Manuelito Shirley RN  Outcome: Ongoing  Goal: STG - Patient uses call light consistently to request assistance with transfers  2/29/2020 2356 by Peewee Pena LPN  Outcome: Ongoing  2/29/2020 1609 by Manuelito Shirley RN  Outcome: Ongoing  Goal: STG - patient uses gait belt during all transfers  2/29/2020 2356 by Peewee Pena LPN  Outcome: Ongoing  2/29/2020 1609 by Manuelito Shirley RN  Outcome: Ongoing     Problem: SKIN INTEGRITY  Goal: LTG - patient will maintain/improve skin integrity through proper skin care techniques  2/29/2020 2356 by Peewee Pena LPN  Outcome: Ongoing  2/29/2020 1609 by Manuelito Shirley RN  Outcome: Ongoing  Goal: LTG - Patient will demonstrate appropriate pressure relief techniques  2/29/2020 2356 by Peewee Pena LPN  Outcome: Ongoing  2/29/2020 1609 by Manuelito Shirley RN  Outcome: Ongoing  Goal: LTG - patient will demonstrate appropriate skin care techniques  2/29/2020 2356 by Peewee Pena LPN  Outcome: Ongoing  2/29/2020 1609 by Manuelito Shirley RN  Outcome: Ongoing  Goal: LTG - Patient will be free from infection  2/29/2020 2356 by Peewee Pena RN  Outcome: Ongoing     Problem: Mobility - Impaired:  Goal: Mobility will improve  Description  Mobility will improve  2/29/2020 2356 by Paula Vasquez LPN  Outcome: Ongoing  2/29/2020 1609 by Vanessa Ferreira RN  Outcome: Ongoing     Problem: Bleeding:  Goal: Will show no signs and symptoms of excessive bleeding  Description  Will show no signs and symptoms of excessive bleeding  2/29/2020 2356 by Paula Vasquez LPN  Outcome: Ongoing  2/29/2020 1609 by Vanessa Ferreira RN  Outcome: Ongoing     Problem: HEMODYNAMIC STATUS  Goal: Patient has stable vital signs and fluid balance  2/29/2020 2356 by Paula Vasquez LPN  Outcome: Ongoing  2/29/2020 1609 by Vanessa Ferreira RN  Outcome: Ongoing     Problem: ACTIVITY INTOLERANCE/IMPAIRED MOBILITY  Goal: Mobility/activity is maintained at optimum level for patient  2/29/2020 2356 by Paula Vasquez LPN  Outcome: Ongoing  2/29/2020 1609 by Vanessa Ferreira RN  Outcome: Ongoing

## 2020-03-02 ENCOUNTER — HOSPITAL ENCOUNTER (OUTPATIENT)
Dept: RADIATION ONCOLOGY | Facility: HOSPITAL | Age: 75
Setting detail: RADIATION/ONCOLOGY SERIES
End: 2020-03-02

## 2020-03-02 LAB
ANION GAP SERPL CALCULATED.3IONS-SCNC: 12 MMOL/L (ref 7–19)
BASOPHILS ABSOLUTE: 0.1 K/UL (ref 0–0.2)
BASOPHILS RELATIVE PERCENT: 1 % (ref 0–1)
BUN BLDV-MCNC: 15 MG/DL (ref 8–23)
CALCIUM SERPL-MCNC: 8.8 MG/DL (ref 8.8–10.2)
CHLORIDE BLD-SCNC: 105 MMOL/L (ref 98–111)
CO2: 24 MMOL/L (ref 22–29)
CREAT SERPL-MCNC: 0.8 MG/DL (ref 0.5–1.2)
EOSINOPHILS ABSOLUTE: 0.2 K/UL (ref 0–0.6)
EOSINOPHILS RELATIVE PERCENT: 4.1 % (ref 0–5)
GFR NON-AFRICAN AMERICAN: >60
GLUCOSE BLD-MCNC: 87 MG/DL (ref 74–109)
HCT VFR BLD CALC: 43.1 % (ref 42–52)
HEMOGLOBIN: 13.8 G/DL (ref 14–18)
IMMATURE GRANULOCYTES #: 0 K/UL
LYMPHOCYTES ABSOLUTE: 1.2 K/UL (ref 1.1–4.5)
LYMPHOCYTES RELATIVE PERCENT: 20.5 % (ref 20–40)
MCH RBC QN AUTO: 28.6 PG (ref 27–31)
MCHC RBC AUTO-ENTMCNC: 32 G/DL (ref 33–37)
MCV RBC AUTO: 89.2 FL (ref 80–94)
MONOCYTES ABSOLUTE: 0.6 K/UL (ref 0–0.9)
MONOCYTES RELATIVE PERCENT: 10.7 % (ref 0–10)
NEUTROPHILS ABSOLUTE: 3.7 K/UL (ref 1.5–7.5)
NEUTROPHILS RELATIVE PERCENT: 63.2 % (ref 50–65)
PDW BLD-RTO: 13.8 % (ref 11.5–14.5)
PLATELET # BLD: 204 K/UL (ref 130–400)
PMV BLD AUTO: 10.6 FL (ref 9.4–12.4)
POTASSIUM REFLEX MAGNESIUM: 3.8 MMOL/L (ref 3.5–5)
RBC # BLD: 4.83 M/UL (ref 4.7–6.1)
SODIUM BLD-SCNC: 141 MMOL/L (ref 136–145)
WBC # BLD: 5.9 K/UL (ref 4.8–10.8)

## 2020-03-02 PROCEDURE — 97110 THERAPEUTIC EXERCISES: CPT

## 2020-03-02 PROCEDURE — 99232 SBSQ HOSP IP/OBS MODERATE 35: CPT | Performed by: PSYCHIATRY & NEUROLOGY

## 2020-03-02 PROCEDURE — 36415 COLL VENOUS BLD VENIPUNCTURE: CPT

## 2020-03-02 PROCEDURE — 6370000000 HC RX 637 (ALT 250 FOR IP): Performed by: INTERNAL MEDICINE

## 2020-03-02 PROCEDURE — 97116 GAIT TRAINING THERAPY: CPT

## 2020-03-02 PROCEDURE — 97129 THER IVNTJ 1ST 15 MIN: CPT

## 2020-03-02 PROCEDURE — 80048 BASIC METABOLIC PNL TOTAL CA: CPT

## 2020-03-02 PROCEDURE — 6370000000 HC RX 637 (ALT 250 FOR IP): Performed by: PSYCHIATRY & NEUROLOGY

## 2020-03-02 PROCEDURE — 6360000002 HC RX W HCPCS: Performed by: PSYCHIATRY & NEUROLOGY

## 2020-03-02 PROCEDURE — 1180000000 HC REHAB R&B

## 2020-03-02 PROCEDURE — 97530 THERAPEUTIC ACTIVITIES: CPT

## 2020-03-02 PROCEDURE — 97130 THER IVNTJ EA ADDL 15 MIN: CPT

## 2020-03-02 PROCEDURE — 85025 COMPLETE CBC W/AUTO DIFF WBC: CPT

## 2020-03-02 RX ORDER — DULOXETIN HYDROCHLORIDE 60 MG/1
60 CAPSULE, DELAYED RELEASE ORAL DAILY
Status: DISCONTINUED | OUTPATIENT
Start: 2020-03-02 | End: 2020-03-05 | Stop reason: HOSPADM

## 2020-03-02 RX ADMIN — DULOXETINE HYDROCHLORIDE 60 MG: 60 CAPSULE, DELAYED RELEASE ORAL at 21:12

## 2020-03-02 RX ADMIN — CARBIDOPA AND LEVODOPA 1 TABLET: 50; 200 TABLET, EXTENDED RELEASE ORAL at 21:13

## 2020-03-02 RX ADMIN — LISINOPRIL 40 MG: 20 TABLET ORAL at 07:54

## 2020-03-02 RX ADMIN — CARVEDILOL 6.25 MG: 3.12 TABLET, FILM COATED ORAL at 17:20

## 2020-03-02 RX ADMIN — CARBIDOPA AND LEVODOPA 1 TABLET: 50; 200 TABLET, EXTENDED RELEASE ORAL at 07:54

## 2020-03-02 RX ADMIN — ENOXAPARIN SODIUM 40 MG: 40 INJECTION SUBCUTANEOUS at 21:13

## 2020-03-02 RX ADMIN — CARBIDOPA AND LEVODOPA 1 TABLET: 50; 200 TABLET, EXTENDED RELEASE ORAL at 13:35

## 2020-03-02 RX ADMIN — CARVEDILOL 6.25 MG: 3.12 TABLET, FILM COATED ORAL at 07:54

## 2020-03-02 RX ADMIN — ACETAMINOPHEN 650 MG: 325 TABLET ORAL at 21:16

## 2020-03-02 RX ADMIN — CLOPIDOGREL BISULFATE 75 MG: 75 TABLET ORAL at 07:54

## 2020-03-02 ASSESSMENT — PAIN SCALES - GENERAL
PAINLEVEL_OUTOF10: 0
PAINLEVEL_OUTOF10: 3
PAINLEVEL_OUTOF10: 0
PAINLEVEL_OUTOF10: 2
PAINLEVEL_OUTOF10: 0

## 2020-03-02 ASSESSMENT — PAIN DESCRIPTION - LOCATION: LOCATION: BACK

## 2020-03-02 ASSESSMENT — PAIN DESCRIPTION - DESCRIPTORS: DESCRIPTORS: DISCOMFORT

## 2020-03-02 ASSESSMENT — PAIN DESCRIPTION - PAIN TYPE: TYPE: ACUTE PAIN

## 2020-03-02 ASSESSMENT — PAIN DESCRIPTION - FREQUENCY: FREQUENCY: INTERMITTENT

## 2020-03-02 ASSESSMENT — PAIN - FUNCTIONAL ASSESSMENT: PAIN_FUNCTIONAL_ASSESSMENT: ACTIVITIES ARE NOT PREVENTED

## 2020-03-02 ASSESSMENT — PAIN DESCRIPTION - ONSET: ONSET: GRADUAL

## 2020-03-02 ASSESSMENT — PAIN DESCRIPTION - PROGRESSION: CLINICAL_PROGRESSION: GRADUALLY IMPROVING

## 2020-03-02 NOTE — PROGRESS NOTES
Nutrition Assessment (Low Risk)    Type and Reason for Visit: Reassess    Nutrition Assessment:  Patient assessed for nutritional risk. Deemed to be at low risk at this time. Will continue to monitor for changes in status. Pt remains nutritionally stable AEB adequate intake.      Malnutrition Assessment:  · Malnutrition Status: No malnutrition    Nutrition Risk Level   Risk Level: Low    Nutrition Diagnosis:   · Problem: No nutrition diagnosis at this time    Nutrition Intervention:  Food and/or Delivery: Continue current diet  Nutrition Education/Counseling/Coordination of Care:  Continued Inpatient Monitoring      Electronically signed by Vitor Lowery, MS, RD, LD on 3/2/20 at 3:40 PM    Contact Number: [phone]

## 2020-03-02 NOTE — PROGRESS NOTES
Durable Medical Equipment   Physician Order      Patient Name Henry Kessler            Patient Phone: [phone] [facility])  [phone] (Mobile)     Patient Address:  [address]     Patient Height Height: 6' (182.9 cm)  Patient Weight 168 lb (76.2 kg)            1945     DME NEEDED:   · WHEELED WALKER      1. [address] [place] PART A AND B        F/O Payor/Plan Precert #   MEDICARE/MEDICARE PART A AND B     Subscriber Subscriber #   Serafin Sprague [id number]   Address Phone   [address] [address] [address] [place] [address] [phone]     2.  [employer] MEDICARE SUPP   F/O Payor/Plan Precert #   [employer] MEDICARE SUPP     Subscriber Subscriber #   Serafin Sprague [id number]   Address Phone   [address] [place] [address] [phone]      DIAGNOSIS:  Hospital Problem List   Date Reviewed: 2020         ICD-10-CM Priority Class Noted POA   Acute ischemic stroke [facility]) I63.9     2020 Yes         ____________________ _____________________ ________________   Physician Signature      Physician Name (print)   Physician NPI          Date

## 2020-03-02 NOTE — PATIENT CARE CONFERENCE
[facility] ACUTE INPATIENT REHABILITATION  TEAM CONFERENCE NOTE    Date: 3/2/2020  Patient Name: Charlotte Manzo        MRN: [id number]    : 1945  (75 y.o.)  Gender: male      Diagnosis: CVA, L WEAKNESS      PHYSICAL THERAPY  STRENGTH  Strength RLE  Comment: GROSSLY 4/5  Strength LLE  Comment: GROSSLY 4/5  ROM  AROM RLE (degrees)  RLE AROM: WFL  AROM LLE (degrees)  LLE AROM : WFL  BED MOBILITY  Bed Mobility  Rolling: Stand by assistance  Supine to Sit: Stand by assistance  Sit to Supine: Stand by assistance  Scooting: Stand by assistance  TRANSFERS  Transfers  Sit to Stand: Stand by assistance  Stand to sit: Stand by assistance  Bed to Chair: Stand by assistance  Car Transfer: Stand by assistance(WITH RW)  Comment: Patient seems to have trouble processing instructions today. Difficulty figuring out which way to turn when transferring bed to wheelchair. WHEELCHAIR PROPULSION  Propulsion 1  Propulsion: Manual  Level: Level Tile  Method: JENNIFER PAT  Level of Assistance: Modified independent  Description/ Details: Pt can self correct path deviations, adequate velocity   Distance: 200  AMBULATION  Ambulation 1  Surface: level tile  Device: Rolling Walker  Other Apparatus: (.)  Assistance: Stand by assistance  Quality of Gait: head down and slightly forward flexed, reciprocal pattern  Gait Deviations: Slow Neli, Decreased step length, Decreased step height, Deviated path  Distance: 225  Comments: Patient did not require a seated rest break with increased ambulation. Patient's L LE was getting fatigued and did drag L foot once near the end of gait.   STAIRS  Stairs  # Steps : 12  Stairs Height: 6\"  Rails: Bilateral  Assistance: Stand by assistance, Contact guard assistance  Comment: STEP PATTERN LEADING UP WITH RIGHT  GOALS:  Short term goals  Time Frame for Short term goals: 1wk  Short term goal 1: indep bed mobility   Short term goal 2: SBA transfers  Short term goal 3: SBA wc propulstion 150ft  Short term goal 4: SBA amb 150ft with least restrictive AD  Short term goal 5: SBA 1 stair     Long term goals  Time Frame for Long term goals : 2wks  Long term goal 1: indep transfers  Long term goal 2: indep wc propulsion 150ft  Long term goal 3: indep amb 150ft with least restrictive AD  Long term goal 4: SBA 4 stairs  Long term goal 5: indep amb 50ft on uneven surfaces    ASSESSMENT:  Assessment: IMPROVED PERFORMANCE ON STEP AND CAR TRANSFER. RAISING HEIGHT OF RW DID IMPROVE MORE ERECT POSTURE DURING GAIT. SPEECH THERAPY    Precautions: Fall/aspiration   Swallowing Status/Diet: regular diet with thin liquids (cardiac diet)        Subjective: Patient alert and cooperative with all therapy tasks.      Objective:  Visual problem solving/reasoning task using picture cards related to ADLs completed. Patient had significant difficulty identifying the problem and coming up with an adequate situation. Without cues patient completed with 20% accuracy. With cues patient completed with 35% accuracy.     Patient completed word association task to address verbal expression and thought organization. Patient completing this task with 88% accuracy and min cues. Patient did require extended time to complete this task. Without cues patient completed with 75% accuracy.      Patient continues to have increased difficulty with short term recall. Some improvement noted for recent/daily events.      Simple/complex following commands with increased amount of components completed with mod/max cues at 70% accuracy. Increased difficulty noted with this task today.      Patient continues to be oriented to time and place.      Patient completed transfer from wheelchair to bed. Patient required cues for technique and safety awareness. Improvement noted from previous sessions with safety awareness.      Continue treatment plan to address cognition.    SHORT TERM GOAL #1:  Goal 1: Patient will utilize compensatory strategies for short-term memory/recall tasks and during ADLs with minimal cues required to utilize.      SHORT TERM GOAL #2:  Goal 2: Patient will complete problem solving/safety awareness tasks during functional ADL's in order to increase functional integration into the environment and decreased assistance from caregivers with 80% accuracy with minimal cues required to complete.     SHORT TERM GOAL #3:  Goal 3: The patient will follow multi step commands related to functional living environment with 100% accuracy and min verbal cues in order to increase functional integration into environment.      SHORT TERM GOAL #4:  Goal 4: The patient will complete simple/complex naming tasks to improve verbal expression and thought organization for effective communication in all settings with min verbal cues at 80% accuracy.      SHORT TERM GOAL #5:  Goal 5: The patient will complete executive function tasks (organizing, planning, self monitoring) with 80% accuracy and min verbal cues during daily living activities to improve safety and awareness in functional living environment.         Long-term Goals  Goal 1: The patient will comprehend communication related to basic medical and social needs and utilize compensatory strategies to maintain safety and participate socially in functional living environment. Goal 2: The patient will develop functional cognitive linguistic based skills and utilize compensatory strategies to communicate wants and needs effectively to different conversational partners, maintain safety and participate socially in functional living environment. Comprehension: 4 - Patient understands basic needs 75-90%+ of the time  Expression: 4 - Expresses basic ideas/needs 75-90%+ of the time  Social Interaction: 4 - Patient appropriate 75-90%+ of the time  Problem Solving: 3 - Patient solves simple/routine tasks 50%-74%  Memory: 2 - Patient remembers 25%-49% of the time     ASSESSMENT:  Assessment: [x]? Progressing towards goals []?Not Progressing towards goals    GOALS MET: 0 STG  0 LTG    OCCUPATIONAL THERAPY    CURRENT IRF-NIKKY SCORES  Eating: CARE Score: 6  Oral Hygiene: CARE Score: 5  Toileting: CARE Score: 4  Shower/Bathe: CARE Score: 4  Upper Body Dressing: CARE Score: 5  Lower Body Dressing: CARE Score: 4  Footwear: CARE Score: 5  Toilet Transfers: CARE Score: 4  Picking Up Object:  CARE Score: 2      UE Functioning:  WFLs    Pain Assessment:  Pain Level: 0  Pain Location: Back    STGs:  Short term goals  Time Frame for Short term goals: 1 week   Short term goal 1: pt will complete overall toileting with supervision   Short term goal 2: pt will complete overall bathing with supervision   Short term goal 3: MET  Short term goal 4: pt will complete overall lower body dressing with supervision   Short term goal 5: pt will complete toilet transfers with supervision   Short term goal 6: pt will complete 5 bilateral strengthening acts to increase overall strength     LTGs:  Long term goals  Time Frame for Long term goals : 2 weeks   Long term goal 1: pt will complete overall toileting with modified independence  Long term goal 2: pt will complete overall bathing with modified independence  Long term goal 3: pt will complete overall upper body dressing with modified independence   Long term goal 4: pt will complete overall lower body dressing with modified independence   Long term goal 5: pt will complete toilet transfers with modified independence   Long term goals 6: pt will complete HEP independently   Long term goal 7: pt will verbalize DME needs     Assessment:  Decreased functional mobility ; Decreased ADL status; Decreased ROM; Decreased strength; Decreased safe awareness; Decreased cognition; Decreased endurance; Decreased balance; Decreased vision/visual deficit;  Decreased high-level IADLs; Decreased fine motor control; Decreased coordination              NUTRITION  Current Wt: Weight: 168 lb (76.2 kg) / Body mass index is orientation and recall of recent/daily events. 3.Supervision for transfers  4.  5.      Goals for following week:  1. MRADLs with distant supervision  2. Improvement following of 1/2 step commands  3. prepare for dicharge  4.   5.     Factors facilitating achievement of predicted outcomes: Motivated and Cooperative    Barriers to the achievement of predicted outcomes: Confusion  Had episode of stroke like symptoms- was reaction to scapolamine patch    Team Members Present at Conference:  : Lacey Newman   Occupational Therapist: Jammie Castle OTR/L  Physical Therapist: Valjean Dubin PT,DPT  Speech Therapist: Serjio Parker MS,CCC-SLP  Nurse:  Zac Pruett, RN, BSN  Nurse Manager:  Zac Pruett RN, BSN  Dietitian:  Kimo Morales MS, RD, LD  Rehab Director:  Jair Bradford approve the established interdisciplinary plan of care as documented within the medical record of Yue Alaniz.

## 2020-03-03 PROCEDURE — 6360000002 HC RX W HCPCS: Performed by: PSYCHIATRY & NEUROLOGY

## 2020-03-03 PROCEDURE — 97129 THER IVNTJ 1ST 15 MIN: CPT

## 2020-03-03 PROCEDURE — 97110 THERAPEUTIC EXERCISES: CPT

## 2020-03-03 PROCEDURE — 6370000000 HC RX 637 (ALT 250 FOR IP): Performed by: PSYCHIATRY & NEUROLOGY

## 2020-03-03 PROCEDURE — 1180000000 HC REHAB R&B

## 2020-03-03 PROCEDURE — 97116 GAIT TRAINING THERAPY: CPT

## 2020-03-03 PROCEDURE — 97130 THER IVNTJ EA ADDL 15 MIN: CPT

## 2020-03-03 PROCEDURE — 6370000000 HC RX 637 (ALT 250 FOR IP): Performed by: INTERNAL MEDICINE

## 2020-03-03 PROCEDURE — 99233 SBSQ HOSP IP/OBS HIGH 50: CPT | Performed by: PSYCHIATRY & NEUROLOGY

## 2020-03-03 PROCEDURE — 97530 THERAPEUTIC ACTIVITIES: CPT

## 2020-03-03 RX ADMIN — CARBIDOPA AND LEVODOPA 1 TABLET: 50; 200 TABLET, EXTENDED RELEASE ORAL at 13:41

## 2020-03-03 RX ADMIN — CARVEDILOL 6.25 MG: 3.12 TABLET, FILM COATED ORAL at 07:36

## 2020-03-03 RX ADMIN — ACETAMINOPHEN 650 MG: 325 TABLET ORAL at 20:42

## 2020-03-03 RX ADMIN — DULOXETINE HYDROCHLORIDE 60 MG: 60 CAPSULE, DELAYED RELEASE ORAL at 07:37

## 2020-03-03 RX ADMIN — CARBIDOPA AND LEVODOPA 1 TABLET: 50; 200 TABLET, EXTENDED RELEASE ORAL at 07:37

## 2020-03-03 RX ADMIN — LISINOPRIL 40 MG: 20 TABLET ORAL at 07:36

## 2020-03-03 RX ADMIN — CARVEDILOL 6.25 MG: 3.12 TABLET, FILM COATED ORAL at 17:02

## 2020-03-03 RX ADMIN — CLOPIDOGREL BISULFATE 75 MG: 75 TABLET ORAL at 07:36

## 2020-03-03 RX ADMIN — ENOXAPARIN SODIUM 40 MG: 40 INJECTION SUBCUTANEOUS at 20:39

## 2020-03-03 RX ADMIN — CARBIDOPA AND LEVODOPA 1 TABLET: 50; 200 TABLET, EXTENDED RELEASE ORAL at 20:39

## 2020-03-03 ASSESSMENT — PAIN - FUNCTIONAL ASSESSMENT: PAIN_FUNCTIONAL_ASSESSMENT: ACTIVITIES ARE NOT PREVENTED

## 2020-03-03 ASSESSMENT — PAIN SCALES - GENERAL
PAINLEVEL_OUTOF10: 0
PAINLEVEL_OUTOF10: 0
PAINLEVEL_OUTOF10: 2
PAINLEVEL_OUTOF10: 0
PAINLEVEL_OUTOF10: 3
PAINLEVEL_OUTOF10: 1

## 2020-03-03 ASSESSMENT — PAIN DESCRIPTION - FREQUENCY
FREQUENCY: INTERMITTENT
FREQUENCY: INTERMITTENT

## 2020-03-03 ASSESSMENT — PAIN DESCRIPTION - PAIN TYPE
TYPE: ACUTE PAIN
TYPE: ACUTE PAIN

## 2020-03-03 ASSESSMENT — PAIN DESCRIPTION - DESCRIPTORS
DESCRIPTORS: ACHING
DESCRIPTORS: ACHING

## 2020-03-03 ASSESSMENT — PAIN DESCRIPTION - LOCATION
LOCATION: BACK
LOCATION: LEG;HIP

## 2020-03-03 ASSESSMENT — PAIN DESCRIPTION - ORIENTATION: ORIENTATION: RIGHT;PROXIMAL

## 2020-03-03 ASSESSMENT — PAIN DESCRIPTION - PROGRESSION
CLINICAL_PROGRESSION: NOT CHANGED
CLINICAL_PROGRESSION: GRADUALLY IMPROVING

## 2020-03-03 ASSESSMENT — PAIN DESCRIPTION - ONSET
ONSET: GRADUAL
ONSET: GRADUAL

## 2020-03-03 NOTE — PROGRESS NOTES
Occupational Therapy     03/03/20 1345   General   Diagnosis Right CVA   Pain Assessment   Patient Currently in Pain No   Pain Assessment 0-10   Pain Level 0   Balance   Sitting Balance Independent   Standing Balance Supervision   Functional Mobility   Functional - Mobility Device Rolling Walker   Activity To/from bathroom; Other   Assist Level Contact guard assistance   Transfers   Sit to stand Stand by assistance   Stand to sit Stand by assistance   Assessment   Performance deficits / Impairments Decreased functional mobility ; Decreased ADL status; Decreased ROM; Decreased strength;Decreased safe awareness;Decreased cognition;Decreased endurance;Decreased balance;Decreased vision/visual deficit; Decreased high-level IADLs;Decreased fine motor control;Decreased coordination   Treatment Diagnosis Right CVA, left sided weakness    Prognosis Good   History Parkinson's, neuropathy, hypertension    Timed Code Treatment Minutes 45 Minutes   Activity Tolerance   Activity Tolerance Patient Tolerated treatment well   Safety Devices   Safety Devices in place Yes   Type of devices Call light within reach; Chair alarm in place   Plan   Current Treatment Recommendations Strengthening; Wheelchair Mobility Training;ROM;Safety Education & Training;Balance Training;Patient/Caregiver Education & Training;Self-Care / ADL;Cognitive/Perceptual Training;Functional Mobility Training;Neuromuscular Re-education;Equipment Evaluation, Education, & procurement;Home Management Training; Endurance Training;Cognitive Reorientation      03/03/20 435 Lifestyle Nadir Needed Supervision or touching assistance   CARE Score 4

## 2020-03-03 NOTE — PLAN OF CARE
Problem: Falls - Risk of:  Goal: Will remain free from falls  Description  Will remain free from falls  3/3/2020 1106 by Mike Morales LPN  Outcome: Ongoing  3/2/2020 2355 by Andrew Mcdonald RN  Outcome: Ongoing  Goal: Absence of physical injury  Description  Absence of physical injury  3/3/2020 1106 by Mike Morales LPN  Outcome: Ongoing  3/2/2020 2355 by Andrew Mcdonald RN  Outcome: Ongoing     Problem: Infection:  Goal: Will remain free from infection  Description  Will remain free from infection  3/3/2020 1106 by Mike Morales LPN  Outcome: Ongoing  3/2/2020 2355 by Andrew Mcdonald RN  Outcome: Ongoing     Problem: Safety:  Goal: Free from accidental physical injury  Description  Free from accidental physical injury  3/3/2020 1106 by Mike Morales LPN  Outcome: Ongoing  3/2/2020 2355 by Andrew Mcdonald RN  Outcome: Ongoing  Goal: Free from intentional harm  Description  Free from intentional harm  3/3/2020 1106 by Mike Morales LPN  Outcome: Ongoing  3/2/2020 2355 by Andrew Mcdonald RN  Outcome: Ongoing  Goal: Ability to demonstrate self-control will improve  Description  Ability to demonstrate self-control will improve  3/3/2020 1106 by Mike Morales LPN  Outcome: Ongoing  3/2/2020 2355 by Andrew Mcdonald RN  Outcome: Ongoing  Goal: Ability to implement measures to prevent violent behavior in the future will improve  Description  Ability to implement measures to prevent violent behavior in the future will improve  3/3/2020 1106 by Mike Morales LPN  Outcome: Ongoing  3/2/2020 2355 by Andrew Mcdonald RN  Outcome: Ongoing  Goal: Ability to redirect hostility and anger into socially appropriate behaviors will improve  Description  Ability to redirect hostility and anger into socially appropriate behaviors will improve  3/3/2020 1106 by Mike Morales LPN  Outcome: Ongoing  3/2/2020 2355 by Andrew Mcdonald RN  Outcome: Ongoing     Problem: Daily Care:  Goal: Daily care needs are met  Description  Daily care needs are met  3/3/2020 1106 by Michael Vora LPN  Outcome: Ongoing  3/2/2020 2355 by Cristofer Nino RN  Outcome: Ongoing     Problem: Pain:  Goal: Patient's pain/discomfort is manageable  Description  Patient's pain/discomfort is manageable  3/3/2020 1106 by Michael Vora LPN  Outcome: Ongoing  3/2/2020 2355 by Cristofer Nino RN  Outcome: Ongoing  Goal: Pain level will decrease  Description  Pain level will decrease  3/3/2020 1106 by Michael Vora LPN  Outcome: Ongoing  3/2/2020 2355 by Cristofer Nino RN  Outcome: Ongoing  Goal: Control of acute pain  Description  Control of acute pain  3/3/2020 1106 by Michael Vora LPN  Outcome: Ongoing  3/2/2020 2355 by Cristofer Nino RN  Outcome: Ongoing  Goal: Control of chronic pain  Description  Control of chronic pain  3/3/2020 1106 by Michael Vora LPN  Outcome: Ongoing  3/2/2020 2355 by Cristofer Nino RN  Outcome: Ongoing     Problem: Skin Integrity:  Goal: Skin integrity will stabilize  Description  Skin integrity will stabilize  3/3/2020 1106 by Michael Vora LPN  Outcome: Ongoing  3/2/2020 2355 by Cristofer Nino RN  Outcome: Ongoing     Problem: Discharge Planning:  Goal: Patients continuum of care needs are met  Description  Patients continuum of care needs are met  3/3/2020 1106 by Michael Vora LPN  Outcome: Ongoing  3/2/2020 2355 by Cristofer Nino RN  Outcome: Ongoing     Problem: IP BLADDER/VOIDING  Goal: LTG - Patient will utilize adaptive techniques/equipment to complete bladder elimination  3/3/2020 1106 by Michael Vora LPN  Outcome: Ongoing  3/2/2020 2355 by Cristofer Nino RN  Outcome: Ongoing     Problem: IP BOWEL ELIMINATION  Goal: LTG - patient will have regular and routine bowel evacuation  3/3/2020 1106 by Michael Vora LPN  Outcome: Ongoing  3/2/2020 2355 by Cristofer Nino RN  Outcome: Ongoing     Problem: IP BREATHING  Goal: LTG - Patient/caregiver will demonstrate/perform proper LPN  Outcome: Ongoing  3/2/2020 2355 by Matilda Villanueva RN  Outcome: Ongoing  Goal: STG - Patient demonstrates skin care/treatment/dressing change  3/3/2020 1106 by David Marquez, LPN  Outcome: Ongoing  3/2/2020 2355 by Matilda Villanueva RN  Outcome: Ongoing  Goal: STG - patient will maintain good skin integrity  3/3/2020 1106 by Davidestelle Marquez, LPN  Outcome: Ongoing  3/2/2020 2355 by Matilda Villanueva RN  Outcome: Ongoing  Goal: STG - Patient exhibits signs of wound healing.   3/3/2020 1106 by Davidestelle Marquez, LPN  Outcome: Ongoing  3/2/2020 2355 by Matilda Villanueva RN  Outcome: Ongoing  Goal: STG - patient demonstrates pressure reduction techniques  3/3/2020 1106 by David Marquez, LPN  Outcome: Ongoing  3/2/2020 2355 by Matilda Villanueva RN  Outcome: Ongoing  Goal: STG - Patient demonstrates preventative skin care measures  3/3/2020 1106 by David Marquez, LPN  Outcome: Ongoing  3/2/2020 2355 by Matilda Villanueva RN  Outcome: Ongoing     Problem: PAIN  Goal: STG - Patient will verbalize an acceptable level of pain  3/3/2020 1106 by David Marquez, LPN  Outcome: Ongoing  3/2/2020 2355 by Matilda Villanueva RN  Outcome: Ongoing  Goal: STG - patients pain is managed to allow active participation in daily activities  3/3/2020 1106 by David Sensing, LPN  Outcome: Ongoing  3/2/2020 2355 by Matilda Villanueva RN  Outcome: Ongoing     Problem: Coping:  Goal: Ability to verbalize frustrations and anger appropriately will improve  Description  Ability to verbalize frustrations and anger appropriately will improve  3/3/2020 1106 by David Sensing, LPN  Outcome: Ongoing  3/2/2020 2355 by Matilda Villanueva RN  Outcome: Ongoing     Problem: Health Behavior:  Goal: Ability to verbalize precipitating factors for violent behavior will improve  Description  Ability to verbalize precipitating factors for violent behavior will improve  3/3/2020 1106 by David Sensing, LPN  Outcome: Ongoing  3/2/2020 2355 by Matilda Villanueva RN  Outcome: Ongoing     Problem: Mobility - Impaired:  Goal: Mobility will improve  Description  Mobility will improve  3/3/2020 1106 by Tiana Andrade LPN  Outcome: Ongoing  3/2/2020 2355 by Luciano Rivers RN  Outcome: Ongoing     Problem: Bleeding:  Goal: Will show no signs and symptoms of excessive bleeding  Description  Will show no signs and symptoms of excessive bleeding  3/3/2020 1106 by Tiana Andrade LPN  Outcome: Ongoing  3/2/2020 2355 by Luciano Rivers RN  Outcome: Ongoing     Problem: HEMODYNAMIC STATUS  Goal: Patient has stable vital signs and fluid balance  3/3/2020 1106 by Tiana Andrade LPN  Outcome: Ongoing  3/2/2020 2355 by Luciano Rivers RN  Outcome: Ongoing     Problem: ACTIVITY INTOLERANCE/IMPAIRED MOBILITY  Goal: Mobility/activity is maintained at optimum level for patient  3/3/2020 1106 by Tiana Andrade LPN  Outcome: Ongoing  3/2/2020 2355 by Luciano Rivers RN  Outcome: Ongoing

## 2020-03-03 NOTE — PROGRESS NOTES
double vision & being lightheaded on 2/18/20. Repeat CT of head showed no changes. Echo done suggested PFO. Cardiology was consulted for ELIZABETH. He was seen by Dr. Gregory Moctezuma and went for ELIZABETH on 2/19/20. No acute issues overnight. REVIEW OF SYSTEMS:  Constitutional: No fevers No chills  Neck:No stiffness  Respiratory: No shortness of breath  Cardiovascular: No chest pain No palpitations  Gastrointestinal: No abdominal pain    Genitourinary: No Dysuria  Neurological: No headache, no seizures      PHYSICAL EXAM:  BP (!) 155/88   Pulse 66   Temp 98.1 °F (36.7 °C) (Temporal)   Resp 17   Ht 6' (1.829 m)   Wt 168 lb (76.2 kg)   SpO2 90%   BMI 22.78 kg/m²     Constitutional - well developed, well nourished. Eyes - conjunctiva normal.   Ear, nose, throat - No scars, masses, or lesions over external nose or ears, no atrophy of tongue  Neck-symmetric, no masses noted, no jugular vein distension  Respiration- chest wall appears symmetric, good expansion,   normal effort without use of accessory muscles  Musculoskeletal - no significant wasting of muscles noted, no bony deformities  Extremities-no clubbing, cyanosis or edema  Skin - warm, dry, and intact. No rash, erythema, or pallor. Psychiatric - mood, affect, and behavior appear normal.      Neurological exam  Awake, alert, fluent oriented appropriate affect  Attention and concentration appear appropriate  Recent and remote memory appears unremarkable  Speech normal without dysarthria  No clear issues with language of fund of knowledge     Cranial Nerve Exam     CN III, IV,VI-EOMI, No nystagmus, conjugate eye movements, no ptosis    CN VII-no facial assymetry       Motor Exam  antigravity throughout upper and lower extremities bilaterally      Tremors- no tremors in hands or head noted     Gait  Not tested    Nursing/pcp notes, imaging,labs and vitals reviewed.      PT,OT and/or speech notes reviewed    Lab Results   Component Value Date    WBC 5.9 03/02/2020 Assistance Stand by assistance;Contact guard assistance   Comment STEP PATTERN LEADING UP WITH RIGHT   Propulsion 1   Propulsion Manual   Level Level Tile   Method RUE;LUE   Level of Assistance Modified independent   Description/ Details Pt can self correct path deviations, adequate velocity    Distance 200   Exercises   Comments SEATED EX BLE'S  1 SET 10 REPS  2#WEIGHT WITH KNEE EXT. Conditions Requiring Skilled Therapeutic Intervention   Assessment IMPROVED PERFORMANCE ON STEP AND CAR TRANSFER. RAISING HEIGHT OF RW DID IMPROVE MORE ERECT POSTURE DURING GAIT. Activity Tolerance   Activity Tolerance Patient Tolerated treatment well   Safety Devices   Type of devices All fall risk precautions in place; Bed alarm in place;Call light within reach; Chair alarm in place;Gait belt;Patient at risk for falls; Left in chair                         RECORD REVIEW: Previous medical records, medications were reviewed at today's visit    IMPRESSION:   1. Right thalamic stroke-PT/OT/SLP. Aspirin and risk factor modification. F/u CT unchanged 2/25. 2.  Parkinson's disease-continue Sinemet CR  3. Essential hypertension- Coreg resumed. 4. DVT prophylaxis-SCDs  5. GI-bowel regimen  6. Confusion.   better  Change Seroquel to as needed for agitation    ELOS:3/5    Team conference with PT/OT/speech/nursing/Care coordinator to review in depth patients care and discharge planning  Ambulating 220 ft  12 steps bilateral hand rails      Continue present care as noted

## 2020-03-03 NOTE — PROGRESS NOTES
Patient:   Robbie Giraldo  MR#:    [id number]   Room:    7204/296-73   YOB: 1945  Date of Progress Note: 3/2/2020  Time of Note                           6:22 PM  Consulting Physician:   Kayla Ruiz M.D. Attending Physician:  Kayla Ruiz MD     CHIEF COMPLAINT: Left-sided weakness, ataxia, dysarthria        Subjective  This 75 y.o. male  with history of Parkinson's disease followed by ,HTN,depression and right frontal meningioma that is followed by Dr. Naida Ortega as outpatient. He presented to [facility] on 2/16/20 with c/o unsteadiness of gain,left leg weakness and occasional changes in speech that started sometime during the night and continued throughout the day on Sunday. CT of head and CTA head/neck were negative. He was not a candidate for TPA d/t being out of the timeframe. He was admitted to the hospitalist service with consult for neurology. He was seen by neurologist Dr. Zainab Hedrick, who felt he had had a right hemispheric stroke. MRI done confirmed a right thalamic stroke. He was supposed to be taking aspirin at home but did not take it routinely. Dr. Zainab Hedrick placed him back on aspirin 325 mg a day and discontinued Plavix. Patient was evaluated by SPT and found to exhibit slow oral prep of more solid consistencies as well as sluggish, inconsistently mildly decreased laryngeal elevation for swallow airway protection. Patient is currently on a regular diet with thin liquids, medicines to be given in pudding or applesauce with assist for feeding. Patient also exhibited slow processing, delayed auditory comprehension, delayed verbal responses with mild-moderate fragmentations observed where the patient started expressions but then discontinued, and decreased high level reasoning/problem solving. Patient continues to have left sided weakness and had an assisted fall with staff on 2/18/20 bumping his left elbow on the door frame. X-ray of left elbow was negative.  Patient also started having c/o double vision & being lightheaded on 2/18/20. Repeat CT of head showed no changes. Echo done suggested PFO. Cardiology was consulted for ELIZABETH. He was seen by Dr. Eunice Herrera and went for ELIZABETH on 2/19/20. No new issues overnight. REVIEW OF SYSTEMS:  Constitutional: No fevers No chills  Neck:No stiffness  Respiratory: No shortness of breath  Cardiovascular: No chest pain No palpitations  Gastrointestinal: No abdominal pain    Genitourinary: No Dysuria  Neurological: No headache, no seizures      PHYSICAL EXAM:  /78   Pulse 73   Temp 97 °F (36.1 °C) (Temporal)   Resp 16   Ht 6' (1.829 m)   Wt 168 lb (76.2 kg)   SpO2 95%   BMI 22.78 kg/m²     Constitutional - well developed, well nourished. Eyes - conjunctiva normal.   Ear, nose, throat - No scars, masses, or lesions over external nose or ears, no atrophy of tongue  Neck-symmetric, no masses noted, no jugular vein distension  Respiration- chest wall appears symmetric, good expansion,   normal effort without use of accessory muscles  Musculoskeletal - no significant wasting of muscles noted, no bony deformities  Extremities-no clubbing, cyanosis or edema  Skin - warm, dry, and intact. No rash, erythema, or pallor. Psychiatric - mood, affect, and behavior appear normal.      Neurological exam  Awake, alert, fluent oriented appropriate affect  Attention and concentration appear appropriate  Recent and remote memory appears unremarkable  Speech normal without dysarthria  No clear issues with language of fund of knowledge     Cranial Nerve Exam     CN III, IV,VI-EOMI, No nystagmus, conjugate eye movements, no ptosis    CN VII-no facial assymetry       Motor Exam  antigravity throughout upper and lower extremities bilaterally      Tremors- no tremors in hands or head noted     Gait  Not tested    Nursing/pcp notes, imaging,labs and vitals reviewed.      PT,OT and/or speech notes reviewed    Lab Results   Component Value Date    WBC 5.9 03/02/2020    HGB 13.8 Stand by assistance;Contact guard assistance   Comment STEP PATTERN LEADING UP WITH RIGHT   Propulsion 1   Propulsion Manual   Level Level Tile   Method RUE;LUE   Level of Assistance Modified independent   Description/ Details Pt can self correct path deviations, adequate velocity    Distance 200   Exercises   Comments SEATED EX BLE'S  1 SET 10 REPS  2#WEIGHT WITH KNEE EXT. Conditions Requiring Skilled Therapeutic Intervention   Assessment IMPROVED PERFORMANCE ON STEP AND CAR TRANSFER. RAISING HEIGHT OF RW DID IMPROVE MORE ERECT POSTURE DURING GAIT. Activity Tolerance   Activity Tolerance Patient Tolerated treatment well   Safety Devices   Type of devices All fall risk precautions in place; Bed alarm in place;Call light within reach; Chair alarm in place;Gait belt;Patient at risk for falls; Left in chair                         RECORD REVIEW: Previous medical records, medications were reviewed at today's visit    IMPRESSION:   1. Right thalamic stroke-PT/OT/SLP. Aspirin and risk factor modification. F/u CT unchanged 2/25. 2.  Parkinson's disease-continue Sinemet CR  3. Essential hypertension- Coreg resumed. 4. DVT prophylaxis-SCDs  5. GI-bowel regimen  6. Confusion.   better  Change Seroquel to as needed for agitation  ELOS:3/5    Continue current care as noted

## 2020-03-03 NOTE — PROGRESS NOTES
[facility]  INPATIENT SPEECH THERAPY  [facility] 8 REHAB UNIT  TIME   900-1000    [x]Daily Note  []Progress Note    Date: 3/3/2020  Patient Name: Selina Franco        MRN: [id number]    Account #: [de-identified]  : 1945  (75 y.o.)  Gender: male   Primary Provider: Bear Bell MD  Precautions: Fall  Swallowing Status/Diet: Regular diet with thin liquids      Subjective: Patient alert and cooperative with all therapy tasks. Objective: Patient completed grooming and dressing task during therapy session. Patient required cues for sequencing and initiation for tasks. Patient was able to take his shirt on and off with min assistance. Patient required more assistance for lower body dressing. Patient completed shaving independently with electric razor. Also mod cues for safety awareness required when completing sit to stand and transfer from bed to wheel chair. Patient completed written 2 step commands with 4 components to address processing and attention skills. Patient completed with 100% accuracy and did require extended time to complete secondary to delayed processing. Simple money task completed. Patient had to give SLP the correct amount of money with visual card to pay for different items (groceries, candy, etc). Patient completed with 50% accuracy and mod verbal cues. Improve recall of recent/daily events in comparison to previous sessions. Patient continues to be oriented to person, place, and time. Continue treatment plan to address cognitive skills. SHORT TERM GOAL #1:  Goal 1: Patient will utilize compensatory strategies for short-term memory/recall tasks and during ADLs with minimal cues required to utilize.      SHORT TERM GOAL #2:  Goal 2: Patient will complete problem solving/safety awareness tasks during functional ADL's in order to increase functional integration into the environment and decreased assistance from caregivers with 80% accuracy with minimal cues required to Location:    Services/Supervision Recommended:      [x]Patient continues to require treatment by a licensed therapist to address functional deficits as outlined in the established plan of care.

## 2020-03-04 PROCEDURE — 6370000000 HC RX 637 (ALT 250 FOR IP): Performed by: INTERNAL MEDICINE

## 2020-03-04 PROCEDURE — 97110 THERAPEUTIC EXERCISES: CPT

## 2020-03-04 PROCEDURE — 6360000002 HC RX W HCPCS: Performed by: PSYCHIATRY & NEUROLOGY

## 2020-03-04 PROCEDURE — 97116 GAIT TRAINING THERAPY: CPT

## 2020-03-04 PROCEDURE — 6370000000 HC RX 637 (ALT 250 FOR IP): Performed by: PSYCHIATRY & NEUROLOGY

## 2020-03-04 PROCEDURE — 99232 SBSQ HOSP IP/OBS MODERATE 35: CPT | Performed by: PSYCHIATRY & NEUROLOGY

## 2020-03-04 PROCEDURE — 1180000000 HC REHAB R&B

## 2020-03-04 PROCEDURE — 97535 SELF CARE MNGMENT TRAINING: CPT

## 2020-03-04 PROCEDURE — 97530 THERAPEUTIC ACTIVITIES: CPT

## 2020-03-04 PROCEDURE — 97130 THER IVNTJ EA ADDL 15 MIN: CPT

## 2020-03-04 PROCEDURE — 97129 THER IVNTJ 1ST 15 MIN: CPT

## 2020-03-04 RX ADMIN — CARBIDOPA AND LEVODOPA 1 TABLET: 50; 200 TABLET, EXTENDED RELEASE ORAL at 07:13

## 2020-03-04 RX ADMIN — CARBIDOPA AND LEVODOPA 1 TABLET: 50; 200 TABLET, EXTENDED RELEASE ORAL at 13:56

## 2020-03-04 RX ADMIN — CLOPIDOGREL BISULFATE 75 MG: 75 TABLET ORAL at 07:13

## 2020-03-04 RX ADMIN — ENOXAPARIN SODIUM 40 MG: 40 INJECTION SUBCUTANEOUS at 20:54

## 2020-03-04 RX ADMIN — CARVEDILOL 6.25 MG: 3.12 TABLET, FILM COATED ORAL at 07:13

## 2020-03-04 RX ADMIN — LISINOPRIL 40 MG: 20 TABLET ORAL at 07:13

## 2020-03-04 RX ADMIN — DULOXETINE HYDROCHLORIDE 60 MG: 60 CAPSULE, DELAYED RELEASE ORAL at 07:13

## 2020-03-04 RX ADMIN — CARVEDILOL 6.25 MG: 3.12 TABLET, FILM COATED ORAL at 17:07

## 2020-03-04 RX ADMIN — CARBIDOPA AND LEVODOPA 1 TABLET: 50; 200 TABLET, EXTENDED RELEASE ORAL at 20:54

## 2020-03-04 ASSESSMENT — PAIN DESCRIPTION - ONSET: ONSET: GRADUAL

## 2020-03-04 ASSESSMENT — PAIN SCALES - GENERAL
PAINLEVEL_OUTOF10: 2
PAINLEVEL_OUTOF10: 0

## 2020-03-04 ASSESSMENT — PAIN DESCRIPTION - FREQUENCY: FREQUENCY: CONTINUOUS

## 2020-03-04 ASSESSMENT — PAIN DESCRIPTION - PAIN TYPE: TYPE: CHRONIC PAIN

## 2020-03-04 ASSESSMENT — PAIN DESCRIPTION - DESCRIPTORS: DESCRIPTORS: ACHING

## 2020-03-04 ASSESSMENT — PAIN DESCRIPTION - LOCATION: LOCATION: BACK;NECK

## 2020-03-04 NOTE — PROGRESS NOTES
[facility]  [facility]IENT SPEECH [facility]ERAP[facility]  [facility] 8 REHAB UNIT  TIME   Time In: 1030  Time Out: 1100  Minutes: 30       [x]Daily Note  []Progress Note    Date: 3/4/2020  Patient Name: Reginald Chris        MRN: [id number]    Account #: [de-identified]  : 1945  (75 y.o.)  Gender: male   Primary Provider: Luis A Santacruz MD  Precautions: Fall  Swallowing Status/Diet: Regular consistencies with thin liquids       Subjective: Patient was alert and cooperative with all tasks. Objective:  Patient continues to utilizes whiteboard as compensatory strategy for orientation information independently. Patient was able to state correct date, month, CRISTIANO, and year using the board. Patient independently stated correct specific hospital and city. Patient completed a complex multi-step directions task this date. Patient was required to follow written directions for placing/organizing colored pegs on a board. Patient completed with 90% accuracy and required moderate verbal and visual cues as task increased in complexity. Patient continues to progress towards goals. SHORT TERM GOAL #1:  Goal 1: Patient will utilize compensatory strategies for short-term memory/recall tasks and during ADLs with minimal cues required to utilize. SHORT TERM GOAL #2:  Goal 2: Patient will complete problem solving/safety awareness tasks during functional ADL's in order to increase functional integration into the environment and decreased assistance from caregivers with 80% accuracy with minimal cues required to complete. SHORT TERM GOAL #3:  Goal 3: The patient will follow multi step commands related to functional living environment with 100% accuracy and min verbal cues in order to increase functional integration into environment.      SHORT TERM GOAL #4:  Goal 4: The patient will complete simple/complex naming tasks to improve verbal expression and thought organization for effective communication in all settings with min verbal cues at 80% accuracy. SHORT TERM GOAL #5:  Goal 5: The patient will complete executive function tasks (organizing, planning, self monitoring) with 80% accuracy and min verbal cues during daily living activities to improve safety and awareness in functional living environment. Comprehension: 5 - Patient understands basic needs (hungry/hot/pain)  Expression: 5 - Expresses basic ideas/needs only (hungry/hot/pain)  Social Interaction: 6 - Patient requires medication for mood and/or effect  Problem Solvin - Patient able to solve simple/routine tasks  Memory: 5 - Patient requires prompting with stress/unfamiliar situations    ASSESSMENT:  Assessment: [x]Progressing towards goals          []Not Progressing towards goals    Patient Tolerance of Treatment:   [x]Tolerated well []Tolerated fair []Required rest breaks []Fatigued    Education:  Learner:  [x]Patient          []Significant Other          []Other  Education provided regarding:  [x]Goals and POC   []Diet and swallowing precautions    []Home Exercise Program  []Progress and/or discharge information  Method of Education:  [x]Discussion          [x]Demonstration          []Handout          []Other  Evaluation of Education:   [x]Verbalized understanding   []Demonstrates without assistance  []Demonstrates with assistance  []Needs further instruction     []No evidence of learning                  []No family present      Plan: []Continue with current plan of care    []Modify current plan of care as follows:    []Discharge patient    Discharge Location:    Services/Supervision Recommended:      [x]Patient continues to require treatment by a licensed therapist to address functional deficits as outlined in the established plan of care.     Electronically signed by WALESKA Vincent on 3/4/2020 at 10:55 AM

## 2020-03-04 NOTE — PROGRESS NOTES
Tolerated treatment well      03/04/20 1449   General   Response To Previous Treatment  --    Family / Caregiver Present  --    Subjective   Subjective  --    Pain Screening   Patient Currently in Pain  --    Pain Assessment   Pain Assessment  --    Pain Level  --    Pre Treatment Pain Screening   Pain at present  --    Scale Used  --    Intervention List  --    Bed Mobility   Rolling  --    Supine to Sit  --    Sit to Supine  --    Transfers   Sit to Stand  --    Stand to sit  --    Bed to Chair  --    Stand Pivot Transfers  --    Squat Pivot Transfers  --    Lateral Transfers  --    Car Transfer  --    Ambulation   Ambulation?  --    WB Status  --    Ambulation 1   Surface  --    Device  --    Assistance  --    Quality of Gait  --    Distance  --    Stairs/Curb   Stairs? --    Stairs   # Steps   --    Rails  --    Device  --    Assistance  --    Comment  --    Wheelchair Activities   Propulsion  --    Exercises   Comments  --    Conditions Requiring Skilled Therapeutic Intervention   Body structures, Functions, Activity limitations Decreased functional mobility ; Decreased balance;Decreased cognition;Decreased endurance   Assessment Performed FTD. Pt tolerated all activities well w/ minimal fatigue. Pt still needing VC's during activities for safety and performing tasks.    Prognosis Good   Activity Tolerance   Activity Tolerance  --    Electronically signed by Karissa Sheets PTA on 3/4/2020 at 2:50 PM

## 2020-03-04 NOTE — PROGRESS NOTES
double vision & being lightheaded on 2/18/20. Repeat CT of head showed no changes. Echo done suggested PFO. Cardiology was consulted for ELIZABETH. He was seen by Dr. Lynda Pringle and went for ELIZABETH on 2/19/20. No new issues overnight. REVIEW OF SYSTEMS:  Constitutional: No fevers No chills  Neck:No stiffness  Respiratory: No shortness of breath  Cardiovascular: No chest pain No palpitations  Gastrointestinal: No abdominal pain    Genitourinary: No Dysuria  Neurological: No headache, no seizures      PHYSICAL EXAM:  BP (!) 144/81   Pulse 70   Temp 98 °F (36.7 °C) (Temporal)   Resp 16   Ht 6' (1.829 m)   Wt 168 lb (76.2 kg)   SpO2 94%   BMI 22.78 kg/m²     Constitutional - well developed, well nourished. Eyes - conjunctiva normal.   Ear, nose, throat - No scars, masses, or lesions over external nose or ears, no atrophy of tongue  Neck-symmetric, no masses noted, no jugular vein distension  Respiration- chest wall appears symmetric, good expansion,   normal effort without use of accessory muscles  Musculoskeletal - no significant wasting of muscles noted, no bony deformities  Extremities-no clubbing, cyanosis or edema  Skin - warm, dry, and intact. No rash, erythema, or pallor. Psychiatric - mood, affect, and behavior appear normal.      Neurological exam  Awake, alert, fluent oriented appropriate affect  Attention and concentration appear appropriate  Recent and remote memory appears unremarkable  Speech normal without dysarthria  No clear issues with language of fund of knowledge     Cranial Nerve Exam     CN III, IV,VI-EOMI, No nystagmus, conjugate eye movements, no ptosis    CN VII-no facial assymetry       Motor Exam  antigravity throughout upper and lower extremities bilaterally      Tremors- no tremors in hands or head noted     Gait  Not tested    Nursing/pcp notes, imaging,labs and vitals reviewed.      PT,OT and/or speech notes reviewed    Lab Results   Component Value Date    WBC 5.9 03/02/2020    HGB 13.8 (L) 03/02/2020    HCT 43.1 03/02/2020    MCV 89.2 03/02/2020     03/02/2020     Lab Results   Component Value Date     03/02/2020    K 3.8 03/02/2020     03/02/2020    CO2 24 03/02/2020    BUN 15 03/02/2020    CREATININE 0.8 03/02/2020    GLUCOSE 87 03/02/2020    CALCIUM 8.8 03/02/2020    PROT 7.7 02/16/2020    LABALBU 4.4 02/16/2020    BILITOT 0.4 02/16/2020    ALKPHOS 88 02/16/2020    AST 13 02/16/2020    ALT <5 (A) 02/16/2020    LABGLOM >60 03/02/2020     Lab Results   Component Value Date    INR 1.06 02/16/2020   No results found for: PHENYTOIN, ESR, CRP      Hai Franklin PTA   Physical Therapist Assistant   Physical Medicine and Rehabilitation   Progress Notes   Signed   Date of Service:  3/3/2020 10:50 AM               Signed             Show:Clear all  []Manual[x]Template[]Copied    Added by:  [x]Naomi Ivory PTA    []Ghada for details       03/03/20 1009   Subjective   Subjective Pt states he is still here and willing to work with therapy. Pt states he is dizzy this morning. Transfers   Sit to Stand Stand by assistance   Stand to sit Stand by assistance   Ambulation   Ambulation?  Yes   WB Status WBAT   Ambulation 1   Surface level tile   Device Rolling Walker   Assistance Contact guard assistance   Quality of Gait walks with feet close together   Gait Deviations Slow Neli;Decreased step length;Decreased step height   Distance 225'x2   Comments no wheelchair follow, pt needed instruction when turning in hallway, and cues to reach back to wheelchair when returned    Exercises   Hamstring Sets HS curls 20x red t-band   Hip Flexion 20x   Hip Abduction 20x, 20x red t-band   Knee Long Arc Quad 20x   Ankle Pumps 20x, heel raises 20x   Comments hip add ball squeeze 20x   Short term goals   Time Frame for Short term goals 1wk   Short term goal 1 indep bed mobility    Short term goal 2 SBA transfers   Short term goal 3 SBA wc propulstion 150ft   Short term goal 4 SBA amb 150ft with

## 2020-03-04 NOTE — PROGRESS NOTES
Physical Therapy  Abilio Jason  [id number]     03/04/20 0840   Subjective   Subjective Patient up in recliner and agrees to work with therapy. Transfers   Sit to Stand Stand by assistance   Stand to sit Stand by assistance   Ambulation   Ambulation? Yes   WB Status WBAT   More Ambulation? Yes   Ambulation 1   Surface level tile   Device Rolling Walker   Assistance Contact guard assistance;Stand by assistance   Distance 25'   Ambulation 2   Surface - 2 level tile   Device 2 Rolling Walker   Assistance 2 Stand by assistance   Distance 125'   Stairs/Curb   Stairs? Yes   Stairs   # Steps  12   Stairs Height 6\"   Rails Bilateral   Assistance Contact guard assistance;Stand by assistance   Comment patient able to safely demonstrate stair training   Exercises   Hip Flexion 20   Hip Abduction 20   Knee Long Arc Quad 20   Ankle Pumps 20   Comments B LE ex's in sitting  and ball squeezes to increase strength   Other Activities   Comment Patient did well with therapy. Patient went to OT after PT treatment.    Short term goals   Time Frame for Short term goals 1wk   Short term goal 1 indep bed mobility    Short term goal 2 SBA transfers   Short term goal 3 SBA wc propulstion 150ft   Short term goal 4 SBA amb 150ft with least restrictive AD   Short term goal 5 SBA 1 stair    Long term goals   Time Frame for Long term goals  2wks   Long term goal 1 indep transfers   Long term goal 2 indep wc propulsion 150ft   Long term goal 3 indep amb 150ft with least restrictive AD   Long term goal 4 SBA 4 stairs   Long term goal 5 indep amb 50ft on uneven surfaces   Activity Tolerance   Activity Tolerance Patient Tolerated treatment well   Safety Devices   Type of devices Left in chair   Electronically signed by Lianet Gallo PTA on 3/4/2020 at 9:11 AM

## 2020-03-04 NOTE — PROGRESS NOTES
Occupational Therapy     03/04/20 1300   General   Diagnosis Right CVA   Pain Assessment   Patient Currently in Pain No   Pain Assessment 0-10   Pain Level 0   Balance   Sitting Balance Independent   Standing Balance Supervision   Functional Mobility   Functional - Mobility Device Rolling Walker   Activity Other; To/from bathroom; Retrieve items;Transport items   Assist Level Contact guard assistance   Functional Mobility Comments Light homemaking tasks in ADL apartment, cues to step closer to objects before attempting to retrieve them. Transfers   Sit to stand Stand by assistance   Stand to sit Stand by assistance   Transfer Comments Recliner, couch, and w/c. Min cues for hand placement this tx session. Pt. family educated on applying gait belt and safe transfer techs. Tub Transfers   Tub - Transfer From Rolling walker   Tub - Transfer Type To and From   Tub - Transfer To Transfer tub bench   Tub - Technique Ambulating   Tub Transfers Contact guard   Type of ROM/Therapeutic Exercise   Type of ROM/Therapeutic Exercise Free weights   Comment Given HEP. Long term goals   Long term goals 6 MET   Assessment   Performance deficits / Impairments Decreased functional mobility ; Decreased ADL status; Decreased ROM; Decreased strength;Decreased safe awareness;Decreased cognition;Decreased endurance;Decreased balance;Decreased vision/visual deficit; Decreased high-level IADLs;Decreased fine motor control;Decreased coordination   Assessment FTD with pt. spouse and children, Pt. family demonstrated good understanding of appropriate home setup and pt. safety during functional mobility and transfers. Pt. would benefit from further skilled therapy post d/c home to further increase safety and independence during ADLs.    Treatment Diagnosis Right CVA, left sided weakness    Prognosis Good   History Parkinson's, neuropathy, hypertension    Discharge Recommendations Home with Home health OT   Timed Code Treatment Minutes 45 Minutes Activity Tolerance   Activity Tolerance Patient Tolerated treatment well   Safety Devices   Safety Devices in place Yes   Type of devices Gait belt  (Given to PT.)   Plan   Current Treatment Recommendations Strengthening; Wheelchair Mobility Training;ROM;Safety Education & Training;Balance Training;Patient/Caregiver Education & Training;Self-Care / ADL;Cognitive/Perceptual Training;Functional Mobility Training;Neuromuscular Re-education;Equipment Evaluation, Education, & procurement;Home Management Training; Endurance Training;Cognitive Reorientation

## 2020-03-04 NOTE — PLAN OF CARE
LPN  Outcome: Ongoing  3/3/2020 2344 by Armen García RN  Outcome: Ongoing  Goal: STG - Patient demonstrates skin care/treatment/dressing change  3/4/2020 1028 by Karol Kincaid LPN  Outcome: Ongoing  3/3/2020 2344 by Armen García RN  Outcome: Ongoing  Goal: STG - patient will maintain good skin integrity  3/4/2020 1028 by Karol Kincaid LPN  Outcome: Ongoing  3/3/2020 2344 by Armen García RN  Outcome: Ongoing  Goal: STG - Patient exhibits signs of wound healing.   3/4/2020 1028 by Karol Kincaid LPN  Outcome: Ongoing  3/3/2020 2344 by Armen García RN  Outcome: Ongoing  Goal: STG - patient demonstrates pressure reduction techniques  3/4/2020 1028 by Karol Kincaid LPN  Outcome: Ongoing  3/3/2020 2344 by Armen García RN  Outcome: Ongoing  Goal: STG - Patient demonstrates preventative skin care measures  3/4/2020 1028 by Karol Kincaid LPN  Outcome: Ongoing  3/3/2020 2344 by Armen García RN  Outcome: Ongoing     Problem: PAIN  Goal: STG - Patient will verbalize an acceptable level of pain  3/4/2020 1028 by Karol Kincaid LPN  Outcome: Ongoing  3/3/2020 2344 by Armen García RN  Outcome: Ongoing  Goal: STG - patients pain is managed to allow active participation in daily activities  3/4/2020 1028 by Karol Kincaid LPN  Outcome: Ongoing  3/3/2020 2344 by Armen García RN  Outcome: Ongoing     Problem: Coping:  Goal: Ability to verbalize frustrations and anger appropriately will improve  Description  Ability to verbalize frustrations and anger appropriately will improve  3/4/2020 1028 by Karol Kincaid LPN  Outcome: Ongoing  3/3/2020 2344 by Armen García RN  Outcome: Ongoing     Problem: Health Behavior:  Goal: Ability to verbalize precipitating factors for violent behavior will improve  Description  Ability to verbalize precipitating factors for violent behavior will improve  3/4/2020 1028 by Karol Kincaid LPN  Outcome: Ongoing  3/3/2020 2344 by Armen García

## 2020-03-05 VITALS
RESPIRATION RATE: 18 BRPM | HEIGHT: 72 IN | SYSTOLIC BLOOD PRESSURE: 166 MMHG | TEMPERATURE: 97.7 F | OXYGEN SATURATION: 91 % | BODY MASS INDEX: 22.75 KG/M2 | DIASTOLIC BLOOD PRESSURE: 89 MMHG | HEART RATE: 64 BPM | WEIGHT: 168 LBS

## 2020-03-05 LAB
ANION GAP SERPL CALCULATED.3IONS-SCNC: 11 MMOL/L (ref 7–19)
BASOPHILS ABSOLUTE: 0.1 K/UL (ref 0–0.2)
BASOPHILS RELATIVE PERCENT: 1 % (ref 0–1)
BUN BLDV-MCNC: 16 MG/DL (ref 8–23)
CALCIUM SERPL-MCNC: 8.8 MG/DL (ref 8.8–10.2)
CHLORIDE BLD-SCNC: 106 MMOL/L (ref 98–111)
CO2: 23 MMOL/L (ref 22–29)
CREAT SERPL-MCNC: 0.8 MG/DL (ref 0.5–1.2)
EOSINOPHILS ABSOLUTE: 0.2 K/UL (ref 0–0.6)
EOSINOPHILS RELATIVE PERCENT: 3 % (ref 0–5)
GFR NON-AFRICAN AMERICAN: >60
GLUCOSE BLD-MCNC: 89 MG/DL (ref 74–109)
HCT VFR BLD CALC: 41.6 % (ref 42–52)
HEMOGLOBIN: 13.4 G/DL (ref 14–18)
IMMATURE GRANULOCYTES #: 0 K/UL
LYMPHOCYTES ABSOLUTE: 1.1 K/UL (ref 1.1–4.5)
LYMPHOCYTES RELATIVE PERCENT: 17.8 % (ref 20–40)
MCH RBC QN AUTO: 28.3 PG (ref 27–31)
MCHC RBC AUTO-ENTMCNC: 32.2 G/DL (ref 33–37)
MCV RBC AUTO: 87.8 FL (ref 80–94)
MONOCYTES ABSOLUTE: 0.6 K/UL (ref 0–0.9)
MONOCYTES RELATIVE PERCENT: 9.4 % (ref 0–10)
NEUTROPHILS ABSOLUTE: 4.3 K/UL (ref 1.5–7.5)
NEUTROPHILS RELATIVE PERCENT: 68.3 % (ref 50–65)
PDW BLD-RTO: 13.6 % (ref 11.5–14.5)
PLATELET # BLD: 224 K/UL (ref 130–400)
PMV BLD AUTO: 10.5 FL (ref 9.4–12.4)
POTASSIUM REFLEX MAGNESIUM: 3.8 MMOL/L (ref 3.5–5)
RBC # BLD: 4.74 M/UL (ref 4.7–6.1)
SODIUM BLD-SCNC: 140 MMOL/L (ref 136–145)
WBC # BLD: 6.3 K/UL (ref 4.8–10.8)

## 2020-03-05 PROCEDURE — 6370000000 HC RX 637 (ALT 250 FOR IP): Performed by: PSYCHIATRY & NEUROLOGY

## 2020-03-05 PROCEDURE — 36415 COLL VENOUS BLD VENIPUNCTURE: CPT

## 2020-03-05 PROCEDURE — 6370000000 HC RX 637 (ALT 250 FOR IP): Performed by: INTERNAL MEDICINE

## 2020-03-05 PROCEDURE — 99239 HOSP IP/OBS DSCHRG MGMT >30: CPT | Performed by: PSYCHIATRY & NEUROLOGY

## 2020-03-05 PROCEDURE — 85025 COMPLETE CBC W/AUTO DIFF WBC: CPT

## 2020-03-05 PROCEDURE — 80048 BASIC METABOLIC PNL TOTAL CA: CPT

## 2020-03-05 RX ORDER — DULOXETIN HYDROCHLORIDE 60 MG/1
60 CAPSULE, DELAYED RELEASE ORAL DAILY
Qty: 30 CAPSULE | Refills: 0 | Status: SHIPPED | OUTPATIENT
Start: 2020-03-05 | End: 2020-08-03

## 2020-03-05 RX ORDER — QUETIAPINE FUMARATE 25 MG/1
25 TABLET, FILM COATED ORAL NIGHTLY PRN
Qty: 30 TABLET | Refills: 0 | Status: SHIPPED | OUTPATIENT
Start: 2020-03-05 | End: 2020-06-01

## 2020-03-05 RX ORDER — CLOPIDOGREL BISULFATE 75 MG/1
75 TABLET ORAL DAILY
Qty: 30 TABLET | Refills: 0 | Status: SHIPPED | OUTPATIENT
Start: 2020-03-05 | End: 2020-04-09

## 2020-03-05 RX ORDER — IBUPROFEN 200 MG
TABLET ORAL
Qty: 1 TUBE | Refills: 0 | Status: SHIPPED | OUTPATIENT
Start: 2020-03-05 | End: 2020-06-01

## 2020-03-05 RX ORDER — LISINOPRIL 40 MG/1
40 TABLET ORAL DAILY
Qty: 30 TABLET | Refills: 0 | Status: SHIPPED | OUTPATIENT
Start: 2020-03-05 | End: 2020-03-30

## 2020-03-05 RX ORDER — CARBIDOPA AND LEVODOPA 50; 200 MG/1; MG/1
1 TABLET, EXTENDED RELEASE ORAL 3 TIMES DAILY
Qty: 90 TABLET | Refills: 0 | Status: SHIPPED | OUTPATIENT
Start: 2020-03-05 | End: 2020-05-14 | Stop reason: SDUPTHER

## 2020-03-05 RX ORDER — CARVEDILOL 6.25 MG/1
6.25 TABLET ORAL 2 TIMES DAILY WITH MEALS
Qty: 60 TABLET | Refills: 0 | Status: SHIPPED | OUTPATIENT
Start: 2020-03-05 | End: 2020-06-01 | Stop reason: DRUGHIGH

## 2020-03-05 RX ADMIN — CARVEDILOL 6.25 MG: 3.12 TABLET, FILM COATED ORAL at 09:20

## 2020-03-05 RX ADMIN — CARBIDOPA AND LEVODOPA 1 TABLET: 50; 200 TABLET, EXTENDED RELEASE ORAL at 09:16

## 2020-03-05 RX ADMIN — LISINOPRIL 40 MG: 20 TABLET ORAL at 09:17

## 2020-03-05 RX ADMIN — DULOXETINE HYDROCHLORIDE 60 MG: 60 CAPSULE, DELAYED RELEASE ORAL at 09:16

## 2020-03-05 RX ADMIN — CLOPIDOGREL BISULFATE 75 MG: 75 TABLET ORAL at 09:17

## 2020-03-05 ASSESSMENT — PAIN SCALES - GENERAL: PAINLEVEL_OUTOF10: 0

## 2020-03-05 NOTE — PROGRESS NOTES
Patient:   Gregor Bill  MR#:    [id number]   Room:    8770/546-28   YOB: 1945  Date of Progress Note: 3/5/2020  Time of Note                           7:30 AM  Consulting Physician:   Shakir Lima M.D. Attending Physician:  Shakir Lima MD     CHIEF COMPLAINT: Left-sided weakness, ataxia, dysarthria        Subjective  This 75 y.o. male  with history of Parkinson's disease followed by ,HTN,depression and right frontal meningioma that is followed by Dr. Yonathan Bowman as outpatient. He presented to [facility] on 2/16/20 with c/o unsteadiness of gain,left leg weakness and occasional changes in speech that started sometime during the night and continued throughout the day on Sunday. CT of head and CTA head/neck were negative. He was not a candidate for TPA d/t being out of the timeframe. He was admitted to the hospitalist service with consult for neurology. He was seen by neurologist Dr. Yusef Townsend, who felt he had had a right hemispheric stroke. MRI done confirmed a right thalamic stroke. He was supposed to be taking aspirin at home but did not take it routinely. Dr. Yusef Townsend placed him back on aspirin 325 mg a day and discontinued Plavix. Patient was evaluated by SPT and found to exhibit slow oral prep of more solid consistencies as well as sluggish, inconsistently mildly decreased laryngeal elevation for swallow airway protection. Patient is currently on a regular diet with thin liquids, medicines to be given in pudding or applesauce with assist for feeding. Patient also exhibited slow processing, delayed auditory comprehension, delayed verbal responses with mild-moderate fragmentations observed where the patient started expressions but then discontinued, and decreased high level reasoning/problem solving. Patient continues to have left sided weakness and had an assisted fall with staff on 2/18/20 bumping his left elbow on the door frame. X-ray of left elbow was negative.  Patient also started having c/o double vision & being lightheaded on 2/18/20. Repeat CT of head showed no changes. Echo done suggested PFO. Cardiology was consulted for ELIZABETH. He was seen by Dr. Nguyễn Cool and went for ELIZABETH on 2/19/20. No acute issues overnight. REVIEW OF SYSTEMS:  Constitutional: No fevers No chills  Neck:No stiffness  Respiratory: No shortness of breath  Cardiovascular: No chest pain No palpitations  Gastrointestinal: No abdominal pain    Genitourinary: No Dysuria  Neurological: No headache, no seizures      PHYSICAL EXAM:  BP (!) 166/89   Pulse 64   Temp 97.7 °F (36.5 °C) (Temporal)   Resp 18   Ht 6' (1.829 m)   Wt 168 lb (76.2 kg)   SpO2 91%   BMI 22.78 kg/m²     Constitutional - well developed, well nourished. Eyes - conjunctiva normal.   Ear, nose, throat - No scars, masses, or lesions over external nose or ears, no atrophy of tongue  Neck-symmetric, no masses noted, no jugular vein distension  Respiration- chest wall appears symmetric, good expansion,   normal effort without use of accessory muscles  Musculoskeletal - no significant wasting of muscles noted, no bony deformities  Extremities-no clubbing, cyanosis or edema  Skin - warm, dry, and intact. No rash, erythema, or pallor. Psychiatric - mood, affect, and behavior appear normal.      Neurological exam  Awake, alert, fluent oriented appropriate affect  Attention and concentration appear appropriate  Recent and remote memory appears unremarkable  Speech normal without dysarthria  No clear issues with language of fund of knowledge     Cranial Nerve Exam     CN III, IV,VI-EOMI, No nystagmus, conjugate eye movements, no ptosis    CN VII-no facial assymetry       Motor Exam  antigravity throughout upper and lower extremities bilaterally      Tremors- no tremors in hands or head noted     Gait  Not tested    Nursing/pcp notes, imaging,labs and vitals reviewed.      PT,OT and/or speech notes reviewed    Lab Results   Component Value Date    WBC 6.3 03/05/2020

## 2020-03-05 NOTE — PLAN OF CARE
by Lurlean , RN  Outcome: Completed  3/4/2020 2304 by Tye Ybarra RN  Outcome: Ongoing     Problem: Pain:  Goal: Patient's pain/discomfort is manageable  Description  Patient's pain/discomfort is manageable  3/5/2020 1051 by Toby Carmona RN  Outcome: Completed  3/4/2020 2304 by Tye Ybarra RN  Outcome: Ongoing  Goal: Pain level will decrease  Description  Pain level will decrease  3/5/2020 1051 by Toby Carmona RN  Outcome: Completed  3/4/2020 2304 by Tye Ybarra RN  Outcome: Ongoing  Goal: Control of acute pain  Description  Control of acute pain  3/5/2020 1051 by Toby Carmona RN  Outcome: Completed  3/4/2020 2304 by Tye Ybarra RN  Outcome: Ongoing  Goal: Control of chronic pain  Description  Control of chronic pain  3/5/2020 1051 by Toby Carmona RN  Outcome: Completed  3/4/2020 2304 by Tye Ybarra RN  Outcome: Ongoing     Problem: Skin Integrity:  Goal: Skin integrity will stabilize  Description  Skin integrity will stabilize  3/5/2020 1051 by Toby Carmona RN  Outcome: Completed  3/4/2020 2304 by Tye Ybarra RN  Outcome: Ongoing     Problem: Discharge Planning:  Goal: Patients continuum of care needs are met  Description  Patients continuum of care needs are met  3/5/2020 1051 by Toby Carmona RN  Outcome: Completed  3/4/2020 2304 by Tye Ybarra RN  Outcome: Ongoing     Problem: IP BLADDER/VOIDING  Goal: LTG - Patient will utilize adaptive techniques/equipment to complete bladder elimination  3/5/2020 1051 by Toby Carmona RN  Outcome: Completed  3/4/2020 2304 by Tye Ybarra RN  Outcome: Ongoing     Problem: IP BOWEL ELIMINATION  Goal: LTG - patient will have regular and routine bowel evacuation  3/5/2020 1051 by Toby Carmona RN  Outcome: Completed  3/4/2020 2304 by Tye Ybarra RN  Outcome: Ongoing     Problem: IP BREATHING  Goal: LTG - Patient/caregiver will demonstrate/perform proper techniques to maintain patent airway  3/5/2020 1051 by Toby Carmona RN  Outcome: Completed  3/4/2020 2304 by Monica Perez show no signs and symptoms of excessive bleeding  Description  Will show no signs and symptoms of excessive bleeding  3/5/2020 1051 by Bettie Felxi RN  Outcome: Completed  3/4/2020 2304 by Shawanda Mast RN  Outcome: Ongoing     Problem: HEMODYNAMIC STATUS  Goal: Patient has stable vital signs and fluid balance  3/5/2020 1051 by Bettie Felix RN  Outcome: Completed  3/4/2020 2304 by Shawanda Mast RN  Outcome: Ongoing     Problem: ACTIVITY INTOLERANCE/IMPAIRED MOBILITY  Goal: Mobility/activity is maintained at optimum level for patient  3/5/2020 1051 by Bettie Felix RN  Outcome: Completed  3/4/2020 2304 by Shawanda Mast RN  Outcome: Ongoing

## 2020-03-05 NOTE — DISCHARGE SUMMARY
Neurology Discharge Summary     Patient Identification:  Nyla Wolfe is a 75 y.o. male. :  1945  Admit Date:  2020  Discharge date : 2020   Attending Provider: Roberto Archibald MD     Account Number: [de-identified]                                   Admission Diagnoses:   Acute ischemic stroke [facility]) [I63.9]  Acute ischemic stroke [facility]) [I63.9]    Discharge Diagnoses:  Principal Problem:    Acute ischemic stroke [facility])  Active Problems:    Patent foramen ovale    Neuropathy    Benign meningioma ([facility] [address]    Memory difficulties    Primary Parkinsonism ([facility] [address]    Weakness    Confusion    Other insomnia  Resolved Problems:    * No resolved hospital problems. *      Discharge Medications:    Current Discharge Medication List           Details   QUEtiapine (SEROQUEL) 25 MG tablet Take 1 tablet by mouth nightly as needed for Agitation  Qty: 30 tablet, Refills: 0      neomycin-bacitracin-polymyxin (NEOSPORIN) 5-400-5000 ointment Apply topically 4 times daily. Qty: 1 Tube, Refills: 0      clopidogrel (PLAVIX) 75 MG tablet Take 1 tablet by mouth daily  Qty: 30 tablet, Refills: 0              Details   DULoxetine (CYMBALTA) 60 MG extended release capsule Take 1 capsule by mouth daily  Qty: 30 capsule, Refills: 0      lisinopril (PRINIVIL;ZESTRIL) 40 MG tablet Take 1 tablet by mouth daily  Qty: 30 tablet, Refills: 0      carbidopa-levodopa (SINEMET CR)  MG per extended release tablet Take 1 tablet by mouth 3 times daily  Qty: 90 tablet, Refills: 0      carvedilol (COREG) 6.25 MG tablet Take 1 tablet by mouth 2 times daily (with meals)  Qty: 60 tablet, Refills: 0              Details   cyanocobalamin ([employer] VITAMIN B12) 1000 MCG tablet Take 1,000 mcg by mouth daily           Current Discharge Medication List      STOP taking these medications       acetaminophen (TYLENOL) 325 MG tablet Comments:   Reason for Stopping:                 Consults:   none    Hospital Course: This 75 y.o. male  with history of to work towards his goal of returning home with his wife. The patient was admitted to inpatient rehab with improvement. Plan DC home with home health.           Discharge Instructions     Patient Instructions:   Home  Therapy orders: PT, OT, SLP and nursing   Discharge lab work: none  Code status: Full Code   Activity: activity as tolerated  Diet: DIET CARDIAC;    Wound Care: as directed  Equipment: as per therapy      Harmony Prasad MD    At least 35 minutes were spent in discharging the patient Surgeon/Pathologist Verbiage (Will Incorporate Name Of Surgeon From Intro If Not Blank): operated in two distinct and integrated capacities as the surgeon and pathologist.

## 2020-03-05 NOTE — DISCHARGE SUMMARY
Occupational Therapy Discharge Summary         Date: 3/5/2020  Patient Name: Connie Villarreal        MRN: [id number]    : 1945  (75 y.o.)  Gender: male      Diagnosis: CVA, L WEAKNESS  Restrictions/Precautions  Restrictions/Precautions: Fall Risk      Discharge Date: 20      UE Functioning:  WFLs    Home Management:  Functional Mobility  Functional - Mobility Device: Rolling Walker  Activity: Other, To/from bathroom, [employer], Transport items  Assist Level: Contact guard assistance  Functional Mobility Comments: Light homemaking tasks in ADL apartment, cues to step closer to objects before attempting to retrieve them. Adaptive Equipment/DME Status:  Bathroom Equipment: Shower chair, Grab bars in shower  Home Equipment: Rolling walker, Cane, Wheelchair-manual  Arranged RW and BSC    Pain Assessment:  Pain Level: 0  Pain Location: Back, Neck    Remaining Problems:  Decreased functional mobility ; Decreased ADL status; Decreased ROM; Decreased strength; Decreased safe awareness; Decreased cognition; Decreased endurance; Decreased balance; Decreased vision/visual deficit;  Decreased high-level IADLs; Decreased fine motor control; Decreased coordination    STGs:  Short term goals  Time Frame for Short term goals: 1 week   Short term goal 1: pt will complete overall toileting with supervision   Short term goal 2: pt will complete overall bathing with supervision   Short term goal 3: pt will complete overall upper body dressing with supervision   Short term goal 4: pt will complete overall lower body dressing with supervision   Short term goal 5: pt will complete toilet transfers with supervision   Short term goal 6: pt will complete 5 bilateral strengthening acts to increase overall strength   Met 2/6 STGs    LTGs:  Long term goals  Time Frame for Long term goals : 2 weeks   Long term goal 1: pt will complete overall toileting with modified independence  Long term goal 2: pt will complete overall bathing with modified independence  Long term goal 3: pt will complete overall upper body dressing with modified independence   Long term goal 4: pt will complete overall lower body dressing with modified independence   Long term goal 5: pt will complete toilet transfers with modified independence   Long term goals 6: pt will complete HEP independently   Long term goal 7: pt will verbalize DME needs   Met 2/7 LTGs    Discharge Setting and Recommendations:  Home with family. Home health Occupational Therapy to address remaining deficits.      Discharge Care Scores  Eating: CARE Score: 6  Oral Hygiene: CARE Score: 5  Toileting: CARE Score: 4  Shower/Bathe: CARE Score: 4  Upper Body Dressing: CARE Score: 5  Lower Body Dressing: CARE Score: 4  Footwear: CARE Score: 5  Toilet Transfers: CARE Score: 4  Picking Up Object:  CARE Score: 4    Electronically signed by EWELINA Presley on 3/5/20 at 9:21 AM

## 2020-03-09 NOTE — DISCHARGE SUMMARY
Physical Therapy Discharge Note    DATE:  3/9/2020  NAME:  Earlene Grajeda  :  1945  (75 y. o.,male)  MRN:  [id number]    HEIGHT:  Height: 6' (182.9 cm)  WEIGHT:  Weight: 168 lb (76.2 kg)    PATIENT DIAGNOSIS(ES):    Diagnosis: CVA, L WEAKNESS  Additional Pertinent Hx: Parkinson's, Hemiplegia/Hemiparesis, Dysphagia, Polyneuropathy, HTN, depression, R frontal meningioma, Ataxia, stress incontinence, dissiness, visual distrubances     RESTRICTIONS/PRECAUTIONS:    Restrictions/Precautions  Restrictions/Precautions: Fall Risk     OVERALL  ORIENTATION STATUS:  WNL        NEUROLOGICAL  WNL      STRENGTH  Strength RLE  Comment: GROSSLY 4/5  Strength LLE  Comment: GROSSLY 4/5     ROM  AROM RLE (degrees)  RLE AROM: WFL  AROM LLE (degrees)  LLE AROM : WFL     POSTURE/BALANCE  Balance  Posture: Good  Sitting - Static: Good  Sitting - Dynamic: Good  Standing - Static: Good  Standing - Dynamic: Fair, +         ACTIVITY TOLERANCE  Activity Tolerance  Activity Tolerance: Patient Tolerated treatment well        BED MOBILITY  Bed Mobility  Rolling: Stand by assistance  Supine to Sit: Stand by assistance  Sit to Supine: Stand by assistance  Scooting: Stand by assistance  Comment: Worked on bedside sitting, patient leaning backwards upon initial sit, cues to scoot out to edge of bed     TRANSFERS  Sit to Stand: Stand by assistance      Bed to Chair: Stand by assistance  Car Transfer: Stand by assistance       WHEELCHAIR PROPULSION 1  Propulsion 1  Propulsion: Manual  Level: Level Tile  Method: JENNIFER PAT  Level of Assistance: Modified independent  Description/ Details: Pt can self correct path deviations, adequate velocity   Distance: 200     AMBULATION 1  Ambulation 1  Surface: level tile  Device: Rolling Walker  Other Apparatus: (.)  Assistance: Stand by assistance  Quality of Gait: Pt showed narrow DRAKE and sissoring gait on RLE.   Gait Deviations: Slow Neli, Decreased step length, Decreased step height  Distance: TO PRIOR LEVEL OF FUNCTION       IRF/NIKKY  Roll Left and Right  Assistance Needed: Supervision or touching assistance  CARE Score: 4  Discharge Goal: Independent    Sit to Lying  Assistance Needed: Supervision or touching assistance  CARE Score: 4  Discharge Goal: Independent    Lying to Sitting on Side of Bed  Assistance Needed: Supervision or touching assistance  CARE Score: 4  Discharge Goal: Independent    Sit to Stand  Assistance Needed: Supervision or touching assistance  CARE Score: 4  Discharge Goal: Independent    Chair/Bed-to-Chair Transfer  Assistance Needed: Supervision or touching assistance  CARE Score: 4  Discharge Goal: Independent    Car Transfer  Assistance Needed: Supervision or touching assistance  CARE Score: 4  Discharge Goal: Independent    Walk 10 Feet  Assistance Needed: Supervision or touching assistance  CARE Score: 4  Discharge Goal: Independent    Walk 50 Feet with Two Turns  Assistance Needed: Supervision or touching assistance  CARE Score: 4  Discharge Goal: Independent    Walk 150 Feet  Assistance Needed: Supervision or touching assistance  CARE Score: 4  Discharge Goal: Independent    Walking 10 Feet on Uneven Surfaces  Assistance Needed: Supervision or touching assistance  Reason if not Attempted: Not attempted due to medical condition or safety concerns  CARE Score: 4  Discharge Goal: Independent    1 Step (Curb)  Assistance Needed: Supervision or touching assistance  CARE Score: 4  Discharge Goal: Independent    4 Steps  Assistance Needed: Supervision or touching assistance  CARE Score: 4  Discharge Goal: Independent    12 Steps  Assistance Needed: Supervision or touching assistance  Reason if not Attempted: Not attempted due to medical condition or safety concerns  CARE Score: 4  Discharge Goal: Not Attempted    Wheel 50 Feet with Two Turns  Assistance Needed: Independent  CARE Score: 6  Discharge Goal: Independent    Wheel 150 Feet  Assistance Needed: Independent  CARE Score: 6  Discharge Goal: Independent      LAST TREATMENT TIME  PT Individual Minutes  Time In: 5979  Time Out: 1430  Minutes: 45      Electronically signed by Fe Walton PTA on 3/9/2020 at 10:46 AM

## 2020-03-19 NOTE — DISCHARGE SUMMARY
[facility]                      Inpatient Speech Therapy                Discharge Summary      Date: 3/19/2020  Patient Name: Nacho Garsia        MRN: [id number]    Account #: [de-identified]  : 1945  (68 y.o.)  Gender: male     PATIENT DIAGNOSIS(ES):    Diagnosis: CVA, L WEAKNESS  Additional Pertinent Hx: Parkinson's, Hemiplegia/Hemiparesis, Dysphagia, Polyneuropathy, HTN, depression, R frontal meningioma, Ataxia, stress incontinence, dissiness, visual distrubances     History of present illness: This 69 y. o. male  with history of Parkinson's disease followed by ,HTN,depression and right frontal meningioma that is followed by Dr. Tia Willett as outpatient. He presented to [facility] ER on 20 with c/o unsteadiness of gain,left leg weakness and occasional changes in speech that started sometime during the night and continued throughout the day on . CT of head and CTA head/neck were negative. He was not a candidate for TPA d/t being out of the timeframe. He was admitted to the hospitalist service with consult for neurology. He was seen by neurologist Dr. Lev Matta, who felt he had had a right hemispheric stroke. MRI done confirmed a right thalamic stroke. He was supposed to be taking aspirin at home but did not take it routinely. Dr. Lev Matta placed him back on aspirin 325 mg a day and discontinued Plavix. Patient was evaluated by SPT and found to exhibit slow oral prep of more solid consistencies as well as sluggish, inconsistently mildly decreased laryngeal elevation for swallow airway protection. Patient is currently on a regular diet with thin liquids, medicines to be given in pudding or applesauce with assist for feeding. Patient also exhibited slow processing, delayed auditory comprehension, delayed verbal responses with mild-moderate fragmentations observed where the patient started expressions but then discontinued, and decreased high level reasoning/problem solving.  Patient continues to have left sided weakness and had an assisted fall with staff on 20 bumping his left elbow on the door frame. X-ray of left elbow was negative. Patient also started having c/o double vision & being lightheaded on 20. Repeat CT of head showed no changes. Echo done suggested PFO. Cardiology was consulted for ELIZABETH. He was seen by Dr. Brando Alexander and went for ELIZABETH on 20. Patient still with some lightheadedness and blurry vision but has improved. Patient is participating in PT/OT/SPT. He is felt to need a stay on Rehab for continued therapy to work towards his goal of returning home with his wife. The patient was admitted to inpatient rehab with improvement. Plan DC home with home health. (per neurology note)    FIM SCORES  Comprehension: 5 - Patient understands basic needs (hungry/hot/pain)  Expression: 5 - Expresses basic ideas/needs only (hungry/hot/pain)  Social Interaction: 6 - Patient requires medication for mood and/or effect  Problem Solvin - Patient able to solve simple/routine tasks  Memory: 5 - Patient requires prompting with stress/unfamiliar situations      Patient continued to show improvements in areas of orientation, short term recall, and following of multi step commands with ADL's. Patient continued to require min/mod cues for safety awareness during all functional ADL's. Much improvements noted cognitively during his stay. Patient will continue to require speech services when returning home. SHORT TERM GOAL #1:  Goal 1: Patient will utilize compensatory strategies for short-term memory/recall tasks and during ADLs with minimal cues required to utilize. SHORT TERM GOAL #2:  Goal 2: Patient will complete problem solving/safety awareness tasks during functional ADL's in order to increase functional integration into the environment and decreased assistance from caregivers with 80% accuracy with minimal cues required to complete.       SHORT TERM GOAL #3:  Goal 3: The patient will

## 2020-03-30 RX ORDER — LISINOPRIL 40 MG/1
TABLET ORAL
Qty: 30 TABLET | Refills: 0 | Status: SHIPPED | OUTPATIENT
Start: 2020-03-30 | End: 2020-04-22

## 2020-04-10 DIAGNOSIS — C61 PROSTATE CANCER (HCC): Primary | ICD-10-CM

## 2020-05-14 RX ORDER — CARBIDOPA AND LEVODOPA 50; 200 MG/1; MG/1
1 TABLET, EXTENDED RELEASE ORAL 3 TIMES DAILY
Qty: 270 TABLET | Refills: 0 | Status: SHIPPED | OUTPATIENT
Start: 2020-05-14 | End: 2020-10-05

## 2020-05-29 ENCOUNTER — TELEPHONE (OUTPATIENT)
Dept: NEUROLOGY | Age: 75
End: 2020-05-29

## 2020-06-01 ENCOUNTER — OFFICE VISIT (OUTPATIENT)
Dept: NEUROLOGY | Age: 75
End: 2020-06-01
Payer: MEDICARE

## 2020-06-01 ENCOUNTER — OFFICE VISIT (OUTPATIENT)
Dept: CARDIOLOGY | Age: 75
End: 2020-06-01
Payer: MEDICARE

## 2020-06-01 VITALS
BODY MASS INDEX: 23.57 KG/M2 | HEIGHT: 72 IN | WEIGHT: 174 LBS | DIASTOLIC BLOOD PRESSURE: 60 MMHG | HEART RATE: 88 BPM | SYSTOLIC BLOOD PRESSURE: 102 MMHG

## 2020-06-01 VITALS
HEART RATE: 67 BPM | HEIGHT: 72 IN | WEIGHT: 170 LBS | DIASTOLIC BLOOD PRESSURE: 94 MMHG | BODY MASS INDEX: 23.03 KG/M2 | OXYGEN SATURATION: 94 % | SYSTOLIC BLOOD PRESSURE: 149 MMHG

## 2020-06-01 PROBLEM — Z86.73 HISTORY OF STROKE: Status: ACTIVE | Noted: 2020-06-01

## 2020-06-01 PROBLEM — I51.7 LEFT VENTRICULAR HYPERTROPHY: Status: ACTIVE | Noted: 2020-06-01

## 2020-06-01 PROCEDURE — 99214 OFFICE O/P EST MOD 30 MIN: CPT | Performed by: INTERNAL MEDICINE

## 2020-06-01 PROCEDURE — G8427 DOCREV CUR MEDS BY ELIG CLIN: HCPCS | Performed by: PSYCHIATRY & NEUROLOGY

## 2020-06-01 PROCEDURE — 1123F ACP DISCUSS/DSCN MKR DOCD: CPT | Performed by: INTERNAL MEDICINE

## 2020-06-01 PROCEDURE — 3017F COLORECTAL CA SCREEN DOC REV: CPT | Performed by: INTERNAL MEDICINE

## 2020-06-01 PROCEDURE — 1123F ACP DISCUSS/DSCN MKR DOCD: CPT | Performed by: PSYCHIATRY & NEUROLOGY

## 2020-06-01 PROCEDURE — 1036F TOBACCO NON-USER: CPT | Performed by: INTERNAL MEDICINE

## 2020-06-01 PROCEDURE — G8428 CUR MEDS NOT DOCUMENT: HCPCS | Performed by: INTERNAL MEDICINE

## 2020-06-01 PROCEDURE — 3017F COLORECTAL CA SCREEN DOC REV: CPT | Performed by: PSYCHIATRY & NEUROLOGY

## 2020-06-01 PROCEDURE — 4040F PNEUMOC VAC/ADMIN/RCVD: CPT | Performed by: INTERNAL MEDICINE

## 2020-06-01 PROCEDURE — 4040F PNEUMOC VAC/ADMIN/RCVD: CPT | Performed by: PSYCHIATRY & NEUROLOGY

## 2020-06-01 PROCEDURE — 99214 OFFICE O/P EST MOD 30 MIN: CPT | Performed by: PSYCHIATRY & NEUROLOGY

## 2020-06-01 PROCEDURE — G8420 CALC BMI NORM PARAMETERS: HCPCS | Performed by: INTERNAL MEDICINE

## 2020-06-01 PROCEDURE — 0296T PR EXT ECG > 48HR TO 21 DAY RCRD W/CONECT INTL RCRD: CPT | Performed by: INTERNAL MEDICINE

## 2020-06-01 PROCEDURE — G8420 CALC BMI NORM PARAMETERS: HCPCS | Performed by: PSYCHIATRY & NEUROLOGY

## 2020-06-01 PROCEDURE — 1036F TOBACCO NON-USER: CPT | Performed by: PSYCHIATRY & NEUROLOGY

## 2020-06-01 RX ORDER — DULOXETIN HYDROCHLORIDE 30 MG/1
30 CAPSULE, DELAYED RELEASE ORAL DAILY
Qty: 30 CAPSULE | Refills: 2 | Status: SHIPPED | OUTPATIENT
Start: 2020-06-01 | End: 2020-08-18

## 2020-06-01 RX ORDER — ENTACAPONE 200 MG/1
200 TABLET ORAL 3 TIMES DAILY
Qty: 90 TABLET | Refills: 3 | Status: SHIPPED | OUTPATIENT
Start: 2020-06-01 | End: 2020-10-05 | Stop reason: ALTCHOICE

## 2020-06-01 RX ORDER — CARVEDILOL 6.25 MG/1
3.12 TABLET ORAL 2 TIMES DAILY WITH MEALS
COMMUNITY
End: 2020-07-15

## 2020-06-01 ASSESSMENT — ENCOUNTER SYMPTOMS
EYES NEGATIVE: 1
GASTROINTESTINAL NEGATIVE: 1
VOMITING: 0
NAUSEA: 0
SHORTNESS OF BREATH: 0
DIARRHEA: 0
RESPIRATORY NEGATIVE: 1

## 2020-06-01 NOTE — PROGRESS NOTES
male 10/10/2016    Vitamin B12 deficiency      Past Surgical History:   Procedure Laterality Date    BACK SURGERY      MOHS SURGERY      NECK SURGERY      PROSTATECTOMY       No family history on file. Allergies   Allergen Reactions    Finasteride     Other Other (See Comments)     ANTIHISTAMINES - TYPE UNKNOWN  ANTIHISTAMINES - TYPE UNKNOWN  ANTIHISTAMINES - TYPE UNKNOWN    Scopolamine Other (See Comments)     Scopolamine patch-- caused hallucination and confusion    Sulfa Antibiotics      Current Outpatient Medications   Medication Sig Dispense Refill    DULoxetine (CYMBALTA) 30 MG extended release capsule Take 1 capsule by mouth daily 30 capsule 2    entacapone (COMTAN) 200 MG tablet Take 1 tablet by mouth 3 times daily 90 tablet 3    carvedilol (COREG) 6.25 MG tablet Take 3.125 mg by mouth 2 times daily (with meals)      carbidopa-levodopa (SINEMET CR)  MG per extended release tablet Take 1 tablet by mouth 3 times daily 270 tablet 0    lisinopril (PRINIVIL;ZESTRIL) 40 MG tablet TAKE 1 TABLET BY MOUTH EVERY DAY 30 tablet 5    clopidogrel (PLAVIX) 75 MG tablet TAKE 1 TABLET BY MOUTH EVERY DAY 30 tablet 11    DULoxetine (CYMBALTA) 60 MG extended release capsule Take 1 capsule by mouth daily 30 capsule 0    cyanocobalamin ([employer] VITAMIN B12) 1000 MCG tablet Take 1,000 mcg by mouth daily       No current facility-administered medications for this visit.       Social History     Socioeconomic History    Marital status:      Spouse name: Not on file    Number of children: Not on file    Years of education: Not on file    Highest education level: Not on file   Occupational History    Not on file   Social Needs    Financial resource strain: Not on file    Food insecurity     Worry: Not on file     Inability: Not on file    Transportation needs     Medical: Not on file     Non-medical: Not on file   Tobacco Use    Smoking status: Never Smoker    Smokeless tobacco: Never Used

## 2020-06-04 NOTE — PROGRESS NOTES
oriented x 3 appropriate affect  Attention and concentration appear appropriate  . Followed complex commands well. Speech shows hypophonia but no dysarthria  No clear issues with language of fund of knowledge    Cranial Nerve Exam   CN II- Visual fields grossly unremarkable. VA adequate. PERRLA. CN III, IV,VI-EOMI, No nystagmus, conjugate eye movements, no ptosis  CN VII-no facial asymmetry. He has a masked face with decreased blink rate  CN VIII-Hearing intact       Motor Exam  V/V throughout upper and lower extremities bilaterally, has bradykinesia which is worse on the Right. Some cogwheeling is seen on the right. Reflexes   Absent throughout      Tremors- no tremors in hands or head noted    Gait  Very slow  Coordination  Finger to nose-unremarkable          Assessment    ICD-10-CM    1. History of recent stroke Z86.73 External Referral To Physical Therapy   2. Parkinson disease ([facility] [address] G20 External Referral To Physical Therapy   3. History of meningioma of the brain Z86.011    4. Restless leg syndrome G25.81    No further strokelike symptoms. Neuropathy and restless legs are stable. Meningioma followed by Yaima Mccracken and Daniela Paiz. His Parkinson's disease is roughly the same. Comtan will be added to help with his walking. He will decrease his Cymbalta back to 30 mg a day for excessive daytime sleepiness. Plan        Return in about 2 months (around 8/1/2020).     (Please note that portions of this note were completed with a voice recognition program. Efforts were made to edit the dictations but occasionally words are mis-transcribed.)

## 2020-07-01 ENCOUNTER — TELEPHONE (OUTPATIENT)
Dept: UROLOGY | Facility: CLINIC | Age: 75
End: 2020-07-01

## 2020-07-01 NOTE — TELEPHONE ENCOUNTER
Dr Sterling patient    Missed an appointment in March due to Covid-rescheduled for 08/18/20.  Needs an external order for PSA [facility] [phone].

## 2020-07-01 NOTE — TELEPHONE ENCOUNTER
There was no medication name or anything linked to this encounter. I contacted [facility] pharmacy, s/w the pharmacist and she stated we can disregard this request, patient is switching over to pre-packaged pill packs and they over looked that there is an rx on file for the patients cymbalta 30mg. Pharmacist apologized and states they don't need anything further. I voiced understanding.

## 2020-07-02 DIAGNOSIS — C61 PROSTATE CANCER (HCC): Primary | ICD-10-CM

## 2020-08-03 ENCOUNTER — OFFICE VISIT (OUTPATIENT)
Dept: NEUROLOGY | Age: 75
End: 2020-08-03
Payer: MEDICARE

## 2020-08-03 VITALS
HEART RATE: 64 BPM | SYSTOLIC BLOOD PRESSURE: 137 MMHG | WEIGHT: 175 LBS | HEIGHT: 72 IN | RESPIRATION RATE: 14 BRPM | BODY MASS INDEX: 23.7 KG/M2 | DIASTOLIC BLOOD PRESSURE: 88 MMHG

## 2020-08-03 PROCEDURE — 99214 OFFICE O/P EST MOD 30 MIN: CPT | Performed by: PSYCHIATRY & NEUROLOGY

## 2020-08-03 PROCEDURE — 1036F TOBACCO NON-USER: CPT | Performed by: PSYCHIATRY & NEUROLOGY

## 2020-08-03 PROCEDURE — G8427 DOCREV CUR MEDS BY ELIG CLIN: HCPCS | Performed by: PSYCHIATRY & NEUROLOGY

## 2020-08-03 PROCEDURE — G8420 CALC BMI NORM PARAMETERS: HCPCS | Performed by: PSYCHIATRY & NEUROLOGY

## 2020-08-03 PROCEDURE — 3017F COLORECTAL CA SCREEN DOC REV: CPT | Performed by: PSYCHIATRY & NEUROLOGY

## 2020-08-03 PROCEDURE — 4040F PNEUMOC VAC/ADMIN/RCVD: CPT | Performed by: PSYCHIATRY & NEUROLOGY

## 2020-08-03 PROCEDURE — 1123F ACP DISCUSS/DSCN MKR DOCD: CPT | Performed by: PSYCHIATRY & NEUROLOGY

## 2020-08-03 NOTE — PROGRESS NOTES
Chief Complaint   Patient presents with   Peewee Moe is a 75y.o. year old male who is seen for evaluation of Parkinson's disease and neuropathy. 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 Glucose tolerance test was unremarkable. SPEP was done elsewhere and I never received the results. . The big/loud program was helpful for him last year. Had been trying to get him on Rytary but it ended up being too complicated and expensive. He is now on CR 50/200, 3 times a day. A few months ago I also placed him on Comtan. He has been having trouble with diarrhea. His mobility may be a little better on it however. In general, though, he has slowly worsened. He does admit to some occasional delusional thinking and visual hallucinations with animals. I gave him samples of Inbrigia, but he never tried it. Cymbalta has been reduced to 30 mg. Lisinopril 2.5 mg he takes Coreg 6.25 mg at night. The patient has a history of medication sensitivity. . He has a history of a benign cerebral meningioma for which she sees Dr. Scar Rehman. He really doesn't have much in the way of tremor but for the past year or so has had hypophonia, bradykinesia and a tendency to shuffle and not swinging his arms. He does complain of some chronic numbness in his feet and some trouble with memory. He does not have diabetes. Tito Kanner Restless leg symptoms are fairly well controlled. There is a family history of this. He tried [place] in the past but is no longer on it. He was admitted to the rehab unit 2/20 for a right thalamic ischemic stroke. Those records are reviewed in detail. His wife says that he sleeps too much and wakes up early in the morning. He cannot keep up with whether its a.m. or p.m. He repeatedly asked these questions. He has difficulty comprehending tasks. Legs are weak. Blood pressure okay.   Occasionally some double vision when trying to read. Active Ambulatory Problems     Diagnosis Date Noted    Neuropathy     Benign [facility]ioma ([facility] [address] 03/29/2017    Memory difficulties 03/29/2017    Non-smoker 03/29/2017    Normal body mass index (BMI) 03/29/2017    OAB (overactive bladder) 10/19/2017    Primary Parkinsonism ([facility] [address] 03/29/2017    Prostate cancer ([facility] [address] 10/10/2016    BARBARA (stress urinary incontinence), male 10/10/2016    Acute CVA (cerebrovascular accident) ([facility] [address] 02/17/2020    Patent foramen ovale     Acute ischemic stroke ([facility] [address] 02/19/2020    Weakness 02/29/2020    Confusion 02/29/2020    Other insomnia 02/29/2020    History of stroke 06/01/2020    Left ventricular hypertrophy 06/01/2020     Resolved Ambulatory Problems     Diagnosis Date Noted    No Resolved Ambulatory Problems     Past Medical History:   Diagnosis Date    Cancer ([facility] [address]     Parkinson disease ([facility] [address]     Vitamin B12 deficiency        Past Surgical History:   Procedure Laterality Date    BACK SURGERY      MOHS SURGERY      NECK SURGERY      PROSTATECTOMY         History reviewed. No pertinent family history.     Allergies   Allergen Reactions    Finasteride     Other Other (See Comments)     ANTIHISTAMINES - TYPE UNKNOWN  ANTIHISTAMINES - TYPE UNKNOWN  ANTIHISTAMINES - TYPE UNKNOWN    Scopolamine Other (See Comments)     Scopolamine patch-- caused hallucination and confusion    Sulfa Antibiotics        Social History     Socioeconomic History    Marital status:      Spouse name: Not on file    Number of children: Not on file    Years of education: Not on file    Highest education level: Not on file   Occupational History    Not on file   Social Needs    Financial resource strain: Not on file    Food insecurity     Worry: Not on file     Inability: Not on file    Transportation needs     Medical: Not on file     Non-medical: Not on file   Tobacco Use    Smoking status: Never Smoker    Smokeless tobacco: Never Used   Substance and Sexual Activity    Alcohol use: No    Drug use: No    Sexual activity: Not on file   Lifestyle    Physical activity     Days per week: Not on file     Minutes per session: Not on file    Stress: Not on file   Relationships    Social connections     Talks on phone: Not on file     Gets together: Not on file     Attends Religion service: Not on file     Active member of club or organization: Not on file     Attends meetings of clubs or organizations: Not on file     Relationship status: Not on file    Intimate partner violence     Fear of current or ex partner: Not on file     Emotionally abused: Not on file     Physically abused: Not on file     Forced sexual activity: Not on file   Other Topics Concern    Not on file   Social History Narrative     50+ years    He has 2 daughters    Never in the [employer]    He was a [employer] [address]    Presently not driving    No special hobbies or interest    Physically sedentary    No history of alcohol or tobacco consumption or substance usage       Review of Systems  Constitutional: ?Fever ? Sweats ? Chills ? Recent Injury ? Denies all unless marked   HENT:?Headache ? Head Injury ? Sore Throat ? Ear Pain ? Dizziness ? Hearing Loss ? Denies all unless marked   Spine: ? Neck pain ? Back pain ? Sciaticia ? Denies all unless marked   Cardiovascular:?Chest Pain ? Palpitations ? Heart Disease ? Denies all unless marked   Pulmonary: ? Shortness of Breath ? Cough ? Denies all unless marked   Gastrointestinal: ?Abdominal Pain ? Blood in Stool ? Diarrhea ? Constipation ? Nausea ? Vomiting ? Denies all unless marked   Genitourinary: ? Dysuria ? Frequency ? Incontinence ? Urgency ? Denies all unless marked   Musculoskeletal: ? Arthralgia ? Myalgias ? Muscle cramps ? Muscle twitches   ? Denies all unless marked   Extremities: ? Pain ? Swelling ? Denies all unless marked   Skin:? Rash ? Color Change ? Denies all unless marked   Neurological:? Visual Disturbance ? Double Vision ? Slurred Speech ? Trouble swallowing ? Vertigo ? Tingling ? Numbness ? Weakness ? Loss of Balance   ? Loss of Consciousness ? Memory Loss ? Seizures ? Denies all unless marked   Psychiatric/Behavioral:? Depression ? Anxiety ? Denies all unless marked   Sleep: ? Insomnia ? Sleep Disturbance ? Snoring ? Restless Legs ? Daytime Sleepiness ? Sleep Apnea ? Denies all unless marked      Current Outpatient Medications   Medication Sig Dispense Refill    carvedilol (COREG) 6.25 MG tablet TAKE 1 TABLET BY MOUTH TWICE A DAY WITH MEALS 60 tablet 5    DULoxetine (CYMBALTA) 30 MG extended release capsule Take 1 capsule by mouth daily 30 capsule 2    entacapone (COMTAN) 200 MG tablet Take 1 tablet by mouth 3 times daily 90 tablet 3    carbidopa-levodopa (SINEMET CR)  MG per extended release tablet Take 1 tablet by mouth 3 times daily 270 tablet 0    lisinopril (PRINIVIL;ZESTRIL) 40 MG tablet TAKE 1 TABLET BY MOUTH EVERY DAY 30 tablet 5    clopidogrel (PLAVIX) 75 MG tablet TAKE 1 TABLET BY MOUTH EVERY DAY 30 tablet 11    cyanocobalamin (CVS VITAMIN B12) 1000 MCG tablet Take 1,000 mcg by mouth daily       No current facility-administered medications for this visit. /88   Pulse 64   Resp 14   Ht 6' (1.829 m)   Wt 175 lb (79.4 kg)   BMI 23.73 kg/m²       Constitutional - well developed, well nourished. Eyes - conjunctiva normal.  Pupils react to light  Ear, nose, throat -hearing intact to finger rub No scars, masses, or lesions over external nose or ears, no atrophy of tongue  Neck-symmetric, no masses noted, no jugular vein distension. No bruits noted. Respiration- chest wall appears symmetric, good expansion,   normal effort without use of accessory muscles  Cardiovascular- RRR  Musculoskeletal - no significant wasting of muscles noted, no bony deformities, gait no gross ataxia  Extremities-no clubbing, cyanosis or edema  Skin - warm, dry, and intact.   No rash, erythema, or pallor. Psychiatric - mood, affect, and behavior appear normal.      Neurological exam  Awake, alert, fluent oriented x 3 appropriate affect  Attention and concentration appear appropriate  . Followed complex commands well. Speech shows hypophonia but no dysarthria  No clear issues with language of fund of knowledge    Cranial Nerve Exam   CN II- Visual fields grossly unremarkable. VA adequate. PERRLA. CN III, IV,VI-EOMI, No nystagmus, conjugate eye movements, no ptosis  CN VII-no facial asymmetry. He has a masked face with decreased blink rate  CN VIII-Hearing intact       Motor Exam  V/V throughout upper and lower extremities bilaterally, has bradykinesia which is worse on the Right. Some cogwheeling is seen on the right. Reflexes   Absent throughout      Tremors- no tremors in hands or head noted    Gait  Very slow  Coordination  Finger to nose-unremarkable          Assessment    ICD-10-CM    1. Parkinson disease ([facility] [address]  G20    2. History of meningioma of the brain  Z86.011    3. Restless leg syndrome  G25.81    No further strokelike symptoms. Neuropathy and restless legs are stable. Meningioma followed by Yaima Sierra and Laurence salazar. His Parkinson's disease is slowly progressing. Nevertheless, I recommended holding the Comtan for a few days to see if his diarrhea improves. He will also try some shape up shoes to see if his walking improves. Plan        Return in about 2 months (around 10/3/2020).     (Please note that portions of this note were completed with a voice recognition program. Efforts were made to edit the dictations but occasionally words are mis-transcribed.)

## 2020-08-14 NOTE — PROGRESS NOTES
Mr. Torres is 74 y.o. male    CHIEF COMPLAINT: 1 year follow up for Prostate Cancer. I am a former Dr. Mayfield patient.     HPI  Follow up Prostate cancer  Location: Prostate gland  Quality:  Adenocarcinoma  Severity: Low risk organ confined PCa  Duration: Diagnosed in 2013  Context: This was identified when he had a biopsy to evaluate elevated PSA which was obtained as part of prostate cancer screening.  Modifying factors: RALP resulted in biochemical remission  Associated signs or symptoms: From a voiding standpoint he voids with a good stream.  Is been no hematuria.  As described below he has difficulty with urge incontinence.. Patient denies any possible systemic symptoms of prostate cancer such as weight loss, lower extremity edema, or skeletal pain that could be worrisome for metastases.  History related to this issue:   - 11/2013: RALP  Final Diagnosis  Radical prostatectomy:        A.     Moderately differentiated adenocarcinoma, Irons's grade 3+3,  Leelee's score 6.         B.     Tumor involves the right and left lobe from base to apex.         C.     No extracapsular extension.         D.     Bladder neck and apex margins free of malignant infiltrate.       E.      No pathologic diagnosis, bilateral seminal vesicles.       E.     Pathologic classification:  pT2 pNX.     Other issues to be addressed at this visit:   - Urge incontinence -this is been an issue for about 5 years.  It is gotten much worse since has been diagnosed with Parkinson's disease and then a subsequent stroke.  He has sudden unaware loss of urine.  Severity is that he is now continuously in diapers.  He did not respond to Myrbetriq.      PSA  DATE  <0.1  08/14/2020    The following portions of the patient's history were reviewed and updated as appropriate: allergies, current medications, past family history, past medical history, past social history, past surgical history and problem list.      Review of Systems   Constitutional:  Negative for chills and fever.   Gastrointestinal: Negative for abdominal pain, anal bleeding and blood in stool.   Genitourinary: Positive for urgency. Negative for dysuria, frequency and hematuria.         Current Outpatient Medications:   •  carbidopa-levodopa CR (SINEMET CR)  MG per CR tablet, Take 1 tablet by mouth., Disp: , Rfl:   •  carvedilol (COREG) 12.5 MG tablet, Take 12.5 mg by mouth 2 (Two) Times a Day., Disp: , Rfl:   •  clopidogrel (PLAVIX) 75 MG tablet, TAKE 1 TABLET BY MOUTH EVERY DAY, Disp: , Rfl:   •  CVS VITAMIN B-12 1000 MCG tablet, Take 1,000 mcg by mouth Daily., Disp: , Rfl:   •  DULoxetine (CYMBALTA) 30 MG capsule, 60 mg., Disp: , Rfl:   •  hydrocortisone 2.5 % cream, APPLY TO AFFECTED AREA 4 TIMES A DAY, Disp: , Rfl: 0  •  lisinopril (PRINIVIL,ZESTRIL) 40 MG tablet, TAKE 1 TABLET BY MOUTH EVERY DAY, Disp: , Rfl:   •  triamcinolone (KENALOG) 0.1 % ointment, APPLY 1 APPLICATION ON THE SKIN TWICE A DAY, APPLY TO THE LEGS, Disp: , Rfl:   •  aspirin 325 MG tablet, Take  by mouth., Disp: , Rfl:   •  carbidopa-levodopa CR (SINEMET CR)  MG per CR tablet, Take 3 tablets by mouth Daily., Disp: , Rfl:     Past Medical History:   Diagnosis Date   • BPH with urinary obstruction    • Coronary artery disease     WITH HISTORY OF MYOCARDIAL INFARCTION W/OUT HISTORY OF CABG   • Diverticulitis of colon    • Intrinsic sphincter deficiency (ISD)    • Myocardial infarction (CMS/HCC)    • Nocturia    • Parkinson's disease (CMS/HCC) march 2018   • Prostate cancer (CMS/HCC)    • Renal cyst, right    • Urge incontinence    • Urinary frequency        Past Surgical History:   Procedure Laterality Date   • BACK SURGERY     • CARDIAC CATHETERIZATION     • NECK SURGERY     • PROSTATE SURGERY         Social History     Socioeconomic History   • Marital status:      Spouse name: Not on file   • Number of children: Not on file   • Years of education: Not on file   • Highest education level: Not on file  "  Tobacco Use   • Smoking status: Never Smoker   • Smokeless tobacco: Never Used   Substance and Sexual Activity   • Alcohol use: No   • Drug use: No   • Sexual activity: Defer       Family History   Problem Relation Age of Onset   • Colon cancer Brother    • No Known Problems Mother    • No Known Problems Father          Temp 97.5 °F (36.4 °C) (Temporal)   Ht 182.9 cm (72\")   Wt 74.1 kg (163 lb 6.4 oz)   BMI 22.16 kg/m²       Physical Exam  Constitutional:  They  appear well-developed and well-nourished. There are no obvious deformities. No distress. The vital signs are reviewed  Pulmonary/Chest: Effort normal.   GI: Soft. The patient exhibits no distension and no mass. There is no tenderness. There is no rebound and no guarding. No hernia.   Neurological: Patient is alert and oriented to person, place, and time.   Skin: Skin is warm and dry. Not diaphoretic.   Psychiatric:  normal mood and affect. Not agitated.         Data  Results for orders placed or performed during the hospital encounter of 01/23/20   POC Creatinine   Result Value Ref Range    Creatinine 1.10 0.60 - 1.30 mg/dL         Imaging Results (Last 7 Days)     ** No results found for the last 168 hours. **            Assessment and Plan  Diagnoses and all orders for this visit:    Prostate cancer (CMS/HCC)    Urge incontinence      His PSA is <0.2 suggesting no clinical evidence of prostate cancer at this time.   The patient's symptoms are most consistent with overactive/hypercontractile bladder.  It is recommended that the patient watch caffeine and alcohol intake, overall fluid intake, and avoid spicy foods.  Pelvic floor exercises are explained to the patient including relaxation techniques for controlling leakage as well as interrupting involuntary contractions of the bladder.  The patient is urged to maximize bowel function with strategies to avoid constipation being discussed.  A voiding diary is recommended as a means of biofeedback and " further assessment.  Possible pharmaceutical options including anticholinergic, beta-3 agonist, and use of imipramine are discussed.  Her goal is to avoid medication and improve her lifestyle changes.          (Please note that portions of this note were completed with a voice recognition program.)  Juan Sterling MD  08/19/20  09:38

## 2020-08-17 DIAGNOSIS — C61 PROSTATE CANCER (HCC): ICD-10-CM

## 2020-08-18 ENCOUNTER — OFFICE VISIT (OUTPATIENT)
Dept: UROLOGY | Facility: CLINIC | Age: 75
End: 2020-08-18

## 2020-08-18 VITALS — WEIGHT: 163.4 LBS | TEMPERATURE: 97.5 F | HEIGHT: 72 IN | BODY MASS INDEX: 22.13 KG/M2

## 2020-08-18 DIAGNOSIS — N39.41 URGE INCONTINENCE: ICD-10-CM

## 2020-08-18 DIAGNOSIS — C61 PROSTATE CANCER (HCC): Primary | ICD-10-CM

## 2020-08-18 PROCEDURE — 99213 OFFICE O/P EST LOW 20 MIN: CPT | Performed by: UROLOGY

## 2020-08-18 RX ORDER — CARBIDOPA AND LEVODOPA 50; 200 MG/1; MG/1
1 TABLET, EXTENDED RELEASE ORAL
COMMUNITY
Start: 2020-05-14 | End: 2020-10-08 | Stop reason: SDUPTHER

## 2020-08-18 RX ORDER — CLOPIDOGREL BISULFATE 75 MG/1
TABLET ORAL
COMMUNITY
Start: 2020-04-09

## 2020-08-18 RX ORDER — LISINOPRIL 40 MG/1
TABLET ORAL
COMMUNITY
Start: 2020-04-22

## 2020-09-03 ENCOUNTER — OFFICE VISIT (OUTPATIENT)
Dept: CARDIOLOGY | Age: 75
End: 2020-09-03
Payer: MEDICARE

## 2020-09-03 VITALS
HEIGHT: 72 IN | DIASTOLIC BLOOD PRESSURE: 70 MMHG | BODY MASS INDEX: 22.48 KG/M2 | HEART RATE: 70 BPM | WEIGHT: 166 LBS | SYSTOLIC BLOOD PRESSURE: 126 MMHG

## 2020-09-03 PROCEDURE — 3017F COLORECTAL CA SCREEN DOC REV: CPT | Performed by: INTERNAL MEDICINE

## 2020-09-03 PROCEDURE — G8420 CALC BMI NORM PARAMETERS: HCPCS | Performed by: INTERNAL MEDICINE

## 2020-09-03 PROCEDURE — G8427 DOCREV CUR MEDS BY ELIG CLIN: HCPCS | Performed by: INTERNAL MEDICINE

## 2020-09-03 PROCEDURE — 4040F PNEUMOC VAC/ADMIN/RCVD: CPT | Performed by: INTERNAL MEDICINE

## 2020-09-03 PROCEDURE — 1036F TOBACCO NON-USER: CPT | Performed by: INTERNAL MEDICINE

## 2020-09-03 PROCEDURE — 99214 OFFICE O/P EST MOD 30 MIN: CPT | Performed by: INTERNAL MEDICINE

## 2020-09-03 PROCEDURE — 1123F ACP DISCUSS/DSCN MKR DOCD: CPT | Performed by: INTERNAL MEDICINE

## 2020-09-03 ASSESSMENT — ENCOUNTER SYMPTOMS
DIARRHEA: 0
RESPIRATORY NEGATIVE: 1
VOMITING: 0
EYES NEGATIVE: 1
SHORTNESS OF BREATH: 0
NAUSEA: 0
GASTROINTESTINAL NEGATIVE: 1

## 2020-09-03 NOTE — PROGRESS NOTES
[facility] Progress Note                            Date:  9/3/2020  Patient: Gail Montgomery  Age:  75 y. o., 1945      Reason for evaluation:         SUBJECTIVE:    Returns today follow-up assessment overall doing well. And transesophageal echocardiogram 2/16/2020 normal left ventricular function no evidence of intracardiac thrombus. Recent cardiac event monitor 6/1/2020 through 6/15/2020 showed 4 episodes of ventricular tachycardia 20 episodes  Supraventricular tachycardia no evidence of atrial fibrillation no pauses no heart block. Review of Systems   Constitutional: Negative. Negative for chills, fever and unexpected weight change. HENT: Negative. Eyes: Negative. Respiratory: Negative. Negative for shortness of breath. Cardiovascular: Negative. Negative for chest pain. Gastrointestinal: Negative. Negative for diarrhea, nausea and vomiting. Endocrine: Negative. Genitourinary: Negative. Musculoskeletal: Negative. Skin: Negative. Neurological: Negative. All other systems reviewed and are negative. OBJECTIVE:     /70   Pulse 70   Ht 6' (1.829 m)   Wt 166 lb (75.3 kg)   BMI 22.51 kg/m²     Labs:   CBC: No results for input(s): WBC, HGB, HCT, PLT in the last 72 hours. BMP:No results for input(s): NA, K, CO2, BUN, CREATININE, LABGLOM, GLUCOSE in the last 72 hours. BNP: No results for input(s): BNP in the last 72 hours. PT/INR: No results for input(s): PROTIME, INR in the last 72 hours. APTT:No results for input(s): APTT in the last 72 hours. CARDIAC ENZYMES:No results for input(s): CKTOTAL, CKMB, CKMBINDEX, TROPONINI in the last 72 hours. FASTING LIPID PANEL:  Lab Results   Component Value Date    HDL 44 02/17/2020    LDLCALC 186 02/17/2020    TRIG 151 02/17/2020     LIVER PROFILE:No results for input(s): AST, ALT, LABALBU in the last 72 hours.         Past Medical History:   Diagnosis Date    Acute ischemic stroke ([facility] [address]     Cancer Never Smoker    Smokeless tobacco: Never Used   Substance and Sexual Activity    Alcohol use: No    Drug use: No    Sexual activity: Not on file   Lifestyle    Physical activity     Days per week: Not on file     Minutes per session: Not on file    Stress: Not on file   Relationships    Social connections     Talks on phone: Not on file     Gets together: Not on file     Attends Shinto service: Not on file     Active member of club or organization: Not on file     Attends meetings of clubs or organizations: Not on file     Relationship status: Not on file    Intimate partner violence     Fear of current or ex partner: Not on file     Emotionally abused: Not on file     Physically abused: Not on file     Forced sexual activity: Not on file   Other Topics Concern    Not on file   Social History Narrative     50+ years    He has 2 daughters    Never in the [employer]    He was a [employer] [address]    Presently not driving    No special hobbies or interest    Physically sedentary    No history of alcohol or tobacco consumption or substance usage       Physical Examination:  /70   Pulse 70   Ht 6' (1.829 m)   Wt 166 lb (75.3 kg)   BMI 22.51 kg/m²   Physical Exam  Vitals signs reviewed. Constitutional:       Appearance: He is well-developed. Neck:      Vascular: No carotid bruit or JVD. Cardiovascular:      Rate and Rhythm: Normal rate and regular rhythm. Heart sounds: Normal heart sounds. No murmur. No friction rub. No gallop. Pulmonary:      Effort: Pulmonary effort is normal. No respiratory distress. Breath sounds: Normal breath sounds. No wheezing or rales. Abdominal:      General: There is no distension. Tenderness: There is no abdominal tenderness. Lymphadenopathy:      Cervical: No cervical adenopathy. Skin:     General: Skin is warm and dry. ASSESSMENT:     Diagnosis Orders   1. Patent foramen ovale     2.  Left ventricular

## 2020-09-14 ENCOUNTER — TELEPHONE (OUTPATIENT)
Dept: CARDIOLOGY | Age: 75
End: 2020-09-14

## 2020-09-14 RX ORDER — ENTACAPONE 200 MG/1
TABLET ORAL
Qty: 90 TABLET | Refills: 1 | OUTPATIENT
Start: 2020-09-14

## 2020-09-14 RX ORDER — CARBIDOPA AND LEVODOPA 50; 200 MG/1; MG/1
TABLET, EXTENDED RELEASE ORAL
Qty: 90 TABLET | Refills: 5 | Status: SHIPPED | OUTPATIENT
Start: 2020-09-14 | End: 2020-10-22 | Stop reason: SDUPTHER

## 2020-09-14 RX ORDER — LISINOPRIL 40 MG/1
TABLET ORAL
Qty: 30 TABLET | Refills: 1 | OUTPATIENT
Start: 2020-09-14

## 2020-09-14 NOTE — TELEPHONE ENCOUNTER
Patient had appointment with Dr. Angelita Quevedo today for PFO. This was cancelled by daughter. MD wants to know why it was cancelled and to follow up on next appointment. Tried to call patient had to leave a message.

## 2020-09-24 ENCOUNTER — HOSPITAL ENCOUNTER (OUTPATIENT)
Dept: MRI IMAGING | Facility: HOSPITAL | Age: 75
Discharge: HOME | End: 2020-09-24
Admitting: NEUROLOGICAL SURGERY

## 2020-09-24 ENCOUNTER — OFFICE VISIT (OUTPATIENT)
Dept: NEUROSURGERY | Facility: CLINIC | Age: 75
End: 2020-09-24

## 2020-09-24 VITALS — HEIGHT: 72 IN | BODY MASS INDEX: 22.35 KG/M2 | WEIGHT: 165 LBS

## 2020-09-24 DIAGNOSIS — D32.9 MENINGIOMA (HCC): Primary | ICD-10-CM

## 2020-09-24 DIAGNOSIS — D32.9 BENIGN MENINGIOMA (HCC): ICD-10-CM

## 2020-09-24 DIAGNOSIS — Z78.9 NON-SMOKER: ICD-10-CM

## 2020-09-24 DIAGNOSIS — R41.3 MEMORY DIFFICULTIES: ICD-10-CM

## 2020-09-24 LAB — CREAT BLDA-MCNC: 1 MG/DL (ref 0.6–1.3)

## 2020-09-24 PROCEDURE — 99213 OFFICE O/P EST LOW 20 MIN: CPT | Performed by: NEUROLOGICAL SURGERY

## 2020-09-24 PROCEDURE — 82565 ASSAY OF CREATININE: CPT

## 2020-09-24 PROCEDURE — A9577 INJ MULTIHANCE: HCPCS | Performed by: NEUROLOGICAL SURGERY

## 2020-09-24 PROCEDURE — 0 GADOBENATE DIMEGLUMINE 529 MG/ML SOLUTION: Performed by: NEUROLOGICAL SURGERY

## 2020-09-24 PROCEDURE — 70553 MRI BRAIN STEM W/O & W/DYE: CPT

## 2020-09-24 RX ORDER — ENTACAPONE 200 MG/1
200 TABLET ORAL 3 TIMES DAILY
COMMUNITY
Start: 2020-08-26 | End: 2020-09-24 | Stop reason: ALTCHOICE

## 2020-09-24 RX ORDER — DULOXETIN HYDROCHLORIDE 60 MG/1
60 CAPSULE, DELAYED RELEASE ORAL DAILY
COMMUNITY
Start: 2020-08-26

## 2020-09-24 RX ADMIN — GADOBENATE DIMEGLUMINE 14 ML: 529 INJECTION, SOLUTION INTRAVENOUS at 11:10

## 2020-09-24 NOTE — PROGRESS NOTES
SUBJECTIVE:  Patient ID: Asher Torres is a 74 y.o. male is here today for follow-up.    Chief Complaint: Meningioma  Chief Complaint   Patient presents with   • MENINGIOMA     patient is here for an 8 month follow up with MRI today @ [facility]       HPI  74-year-old gentleman who we have been following since 2017 for a right convexity meningioma.  He does have a diagnosis of Parkinson's disease.  We referred him for stereotactic radiation treatments in February however he had stroke and was admitted to [facility] earlier in the year and then ended up with a an inpatient rehab stay.  He is home now back to his baseline.  The following portions of the patient's history were reviewed and updated as appropriate: allergies, current medications, past family history, past medical history, past social history, past surgical history and problem list.    OBJECTIVE:    Review of Systems   Musculoskeletal: Positive for gait problem.   Neurological: Positive for tremors.   All other systems reviewed and are negative.         Physical Exam    Neurologic Exam  Mental Status   Oriented to person, place, and time.   Level of consciousness: alert  Severe bradyphrenia      Cranial Nerves   Cranial nerves II through XII intact.      Motor Exam   Muscle bulk: normal  Overall muscle tone: normal  Right arm pronator drift: absent  Left arm pronator drift: absent     Strength   Strength 5/5 throughout.      Sensory Exam   Light touch normal.   Pinprick normal.      Gait, Coordination, and Reflexes      Gait  Gait: (Slow shuffling gait mildly unsteady)     Coordination   Finger to nose coordination: normal     Tremor   Resting tremor: absent  Intention tremor: absent  Action tremor: absent     Reflexes   Reflexes 2+ except as noted.   Masked face  Bradykinetic    Independent Review of Radiographic Studies:                                               9/2020                                                                                                1/2020      ASSESSMENT/PLAN:  MRI of the brain with and without contrast shows a stable right convexity meningioma.  The patient's meningioma over the last 3 years has demonstrated some steady increase in size.  We again would make a referral back to Dr. Joseph for consideration of radiation treatments to arrest the continued progression of the tumor.  I do not think he is symptomatic from this at this point.  We will see him in follow-up in about 8 months.      1. Meningioma (CMS/HCC)    2. Memory difficulties    3. Non-smoker            No follow-ups on file.      Jimmy Raphael MD

## 2020-10-05 ENCOUNTER — OFFICE VISIT (OUTPATIENT)
Dept: NEUROLOGY | Age: 75
End: 2020-10-05
Payer: MEDICARE

## 2020-10-05 VITALS
DIASTOLIC BLOOD PRESSURE: 96 MMHG | HEART RATE: 66 BPM | SYSTOLIC BLOOD PRESSURE: 141 MMHG | BODY MASS INDEX: 22.48 KG/M2 | HEIGHT: 72 IN | WEIGHT: 166 LBS

## 2020-10-05 PROCEDURE — G8427 DOCREV CUR MEDS BY ELIG CLIN: HCPCS | Performed by: PSYCHIATRY & NEUROLOGY

## 2020-10-05 PROCEDURE — 1123F ACP DISCUSS/DSCN MKR DOCD: CPT | Performed by: PSYCHIATRY & NEUROLOGY

## 2020-10-05 PROCEDURE — G8484 FLU IMMUNIZE NO ADMIN: HCPCS | Performed by: PSYCHIATRY & NEUROLOGY

## 2020-10-05 PROCEDURE — 99214 OFFICE O/P EST MOD 30 MIN: CPT | Performed by: PSYCHIATRY & NEUROLOGY

## 2020-10-05 PROCEDURE — G8420 CALC BMI NORM PARAMETERS: HCPCS | Performed by: PSYCHIATRY & NEUROLOGY

## 2020-10-05 PROCEDURE — 3017F COLORECTAL CA SCREEN DOC REV: CPT | Performed by: PSYCHIATRY & NEUROLOGY

## 2020-10-05 PROCEDURE — 1036F TOBACCO NON-USER: CPT | Performed by: PSYCHIATRY & NEUROLOGY

## 2020-10-05 PROCEDURE — 4040F PNEUMOC VAC/ADMIN/RCVD: CPT | Performed by: PSYCHIATRY & NEUROLOGY

## 2020-10-05 NOTE — PROGRESS NOTES
Chief Complaint   Patient presents with    Tremors     2 month follow up, the patient states he has been falling a lot recently        Justina Morales is a 75y.o. year old male who is seen for evaluation of Parkinson's disease and neuropathy. 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 Glucose tolerance test was unremarkable. SPEP was done elsewhere and I never received the results. . The big/loud program was helpful for him last year. Had been trying to get him on Rytary but it ended up being too complicated and expensive. He is now on CR 50/200, 3 times a day. 4 months ago Comtan was added but he had to get off of it because of some diarrhea and weight loss. They were afraid to try the shape up shoes. His mobility may be a little better on it however. In general, though, he has slowly worsened. He does admit to some occasional delusional thinking and visual hallucinations with animals. I gave him samples of Inbrigia, but he never tried it. Cymbalta has been reduced to 30 mg.  he takes Coreg 6.25 mg at night. The patient has a history of medication sensitivity. . He has a history of a benign cerebral meningioma for which she sees Dr. Trevor Meadows. Had a follow-up MRI last month. There are advanced small vessel ischemic changes in the meningioma may be a little bit bigger. He really doesn't have much in the way of tremor but for the past year or so has had hypophonia, bradykinesia and a tendency to shuffle and not swinging his arms. He does complain of some chronic numbness in his feet and some trouble with memory. He does not have diabetes. Clif Dave Restless leg symptoms are fairly well controlled. There is a family history of this. He tried [place] in the past but is no longer on it. He was admitted to the rehab unit 2/20 for a right thalamic ischemic stroke. Takes Plavix.   Active Ambulatory Problems     Diagnosis Date Noted  Neuropathy     Benign meningioma ([facility] 03/29/2017    Memory difficulties 03/29/2017    Non-smoker 03/29/2017    Normal body mass index (BMI) 03/29/2017    OAB (overactive bladder) 10/19/2017    Primary Parkinsonism ([facility] 03/29/2017    Prostate cancer ([facility] 10/10/2016    BARBARA (stress urinary incontinence), male 10/10/2016    Acute CVA (cerebrovascular accident) ([facility] 02/17/2020    Patent foramen ovale     Acute ischemic stroke ([facility] 02/19/2020    Weakness 02/29/2020    Confusion 02/29/2020    Other insomnia 02/29/2020    History of stroke 06/01/2020    Left ventricular hypertrophy 06/01/2020     Resolved Ambulatory Problems     Diagnosis Date Noted    No Resolved Ambulatory Problems     Past Medical History:   Diagnosis Date    Cancer ([facility]     Parkinson disease ([facility]     Vitamin B12 deficiency        Past Surgical History:   Procedure Laterality Date    BACK SURGERY      MOHS SURGERY      NECK SURGERY      PROSTATECTOMY         History reviewed. No pertinent family history.     Allergies   Allergen Reactions    Finasteride     Other Other (See Comments)     ANTIHISTAMINES - TYPE UNKNOWN  ANTIHISTAMINES - TYPE UNKNOWN  ANTIHISTAMINES - TYPE UNKNOWN    Scopolamine Other (See Comments)     Scopolamine patch-- caused hallucination and confusion    Sulfa Antibiotics        Social History     Socioeconomic History    Marital status:      Spouse name: Not on file    Number of children: Not on file    Years of education: Not on file    Highest education level: Not on file   Occupational History    Not on file   Social Needs    Financial resource strain: Not on file    Food insecurity     Worry: Not on file     Inability: Not on file    Transportation needs     Medical: Not on file     Non-medical: Not on file   Tobacco Use    Smoking status: Never Smoker    Smokeless tobacco: Never Used   Substance and Sexual Activity    Alcohol use: No    Drug use: No    Sexual activity: BY MOUTH EVERY DAY 30 capsule 5    carvedilol (COREG) 6.25 MG tablet TAKE 1 TABLET BY MOUTH TWICE A DAY WITH MEALS 60 tablet 5    lisinopril (PRINIVIL;ZESTRIL) 40 MG tablet TAKE 1 TABLET BY MOUTH EVERY DAY 30 tablet 5    clopidogrel (PLAVIX) 75 MG tablet TAKE 1 TABLET BY MOUTH EVERY DAY 30 tablet 11    cyanocobalamin (CVS VITAMIN B12) 1000 MCG tablet Take 1,000 mcg by mouth daily       No current facility-administered medications for this visit. BP (!) 141/96   Pulse 66   Ht 6' (1.829 m)   Wt 166 lb (75.3 kg)   BMI 22.51 kg/m²       Constitutional - well developed, well nourished. Eyes - conjunctiva normal.  Pupils react to light  Ear, nose, throat -hearing intact to finger rub No scars, masses, or lesions over external nose or ears, no atrophy of tongue  Neck-symmetric, no masses noted, no jugular vein distension. No bruits noted. Respiration- chest wall appears symmetric, good expansion,   normal effort without use of accessory muscles  Cardiovascular- RRR  Musculoskeletal - no significant wasting of muscles noted, no bony deformities, gait no gross ataxia  Extremities-no clubbing, cyanosis or edema  Skin - warm, dry, and intact. No rash, erythema, or pallor. Psychiatric - mood, affect, and behavior appear normal.      Neurological exam  Awake, alert, fluent oriented x 3 appropriate affect  Attention and concentration appear appropriate  . Followed complex commands well. Speech shows hypophonia but no dysarthria  No clear issues with language of fund of knowledge    Cranial Nerve Exam   CN II- Visual fields grossly unremarkable. VA adequate. PERRLA. CN III, IV,VI-EOMI, No nystagmus, conjugate eye movements, no ptosis  CN VII-no facial asymmetry. He has a masked face with decreased blink rate  CN VIII-Hearing intact       Motor Exam  V/V throughout upper and lower extremities bilaterally, has bradykinesia which is worse on the Right. Some cogwheeling is seen on the right.       Reflexes

## 2020-10-07 ENCOUNTER — HOSPITAL ENCOUNTER (OUTPATIENT)
Dept: RADIATION ONCOLOGY | Facility: HOSPITAL | Age: 75
Setting detail: RADIATION/ONCOLOGY SERIES
End: 2020-10-07

## 2020-10-07 NOTE — PROGRESS NOTES
[facility]  MD Eulalia Willett BSN, PA-C  _______________________________________________  [facility]  Department of Radiation Oncology  [address] [place] [id number]  Office: [phone]  Fax: [phone]    DATE:  10/08/2020  PATIENT:   Asher Torres 1945                         MEDICAL RECORD #:  [id number]                                                       REASON FOR CONSULTATION:  Asher Torres is a very pleasant male that has been referred to our office by Jimmy Raphael MD to discuss radiotherapy recommendations.     History of Present Illness:  Diagnosed in March 2017 right frontal meningioma, 2.3 cm monitored by Dr. Raphael.  Routine follow-up imaging showed a very slight asymptomatic increase in size on until 01/23/2020, MRI of brain reidentified extra-axial dural based enhancing mass along the right frontal convexity 3.4 cm compared to 2.8 cm previously on sagittal views.    • 01/23/2020 - MRI Brain enhancing mass along the right frontal convexity, measures up to 3.4 cm, previously 2.8 cm. Planned for SRS on 01/30/2020, acute CVA on 02/16/2020.  • 09/24/2020 - MRI Brain reveals an enhancing 3.4 x 2.1 x 1.9 cm extra-axial lesion adjacent the right frontal lobe remains stable with effacement of the underlying sulci. Referral to radiation oncology for steady increase in size over 3 years.    03/17/2017 - MRI Brain d/t progressive memory loss, confusion:  • A 2.3cm right frontal lesion along the superior cortex  • Mild to moderate microvascular disease change  • No acute process.    03/29/2017 - Appointment with :  •  has a right frontal meningioma right now that is asymptomatic and does not require treatment.    • Watch this with serial imaging every 6 months and plan focal beam radiation treatments should this mass demonstrate growth and progression.    • Regarding his other complaints I think Mr. Torres has a  Parkinson-like syndrome given his mask face Madi kinetic movements and bradyphrenia.    • We are going to refer him to Dr. Schmidt consideration of this diagnosis.    • We will reimage him in 6 months with an MRI.  • Return in about 6 months (around 9/29/2017) for follow up w/scan - DR SMITH.    09/26/2017 - MRI Brain with and without contrast:  • Again identified is the right frontal convexity extra-axial enhancing mass compatible with meningioma. The mass measures approximately 2.6 x 1.1 x 1.8 cm and is unchanged.  • No additional abnormal gadolinium enhancement appreciated within the brain.  • There is diffuse central and cortical atrophy.  • There are multiple punctate and confluent subcortical and periventricular FLAIR signal hyperintensities the is observed previously and also appear stable.  • There is no mass effect or midline shift. There is no hydrocephalus.  • There is no abnormal intra or extra-axial blood products.  • There are no diffusion signal abnormalities indicate an acute ischemic event/infarction.  IMPRESSION:  • Right frontal lobe convexity meningioma, stable.  • Extensive subcortical chronic ischemic white matter changes.  • No acute intracranial process.    09/26/2017 - Appointment with :  • The patient has been diagnosed with Parkinson's and has been started on therapy and is doing very well.  His meningioma is unchanged compared to previous imaging.    • I would reimage him every year.  • Return in about 1 year (around 9/26/2018) for follow up w/scan    09/27/2018 - MRI Brain with and without contrast:  • Masses: And 18.6 x 27 mm enhancing right frontal extra axial mass is present. This uniformly enhances and is consistent with a meningioma. On the prior exam this was 17.8 x 25.5 mm.  • Basilar cisterns: Maintained.  • Extra axial space: No abnormal extra-axial fluid.  • Gray-white matter signal: On the FLAIR sequence there is extensive signal to suggest chronic microvascular  ischemic change both in the paraventricular white matter and at the juxtacortical gray-white junction bilaterally.  • Enhancement: Enhancement of the meningioma is noted.   IMPRESSION:  • Right frontal meningioma is again noted. On this exam the lesion is 18.6 x 27 mm. There is uniform enhancement. On the prior exam this was 17.8 x 25.5 mm.  • Moderately extensive chronic microvascular ischemic changes again noted in the white matter.      09/27/2018 - Appointment with :  • The patient is done well.  His imaging is stable.    • He is a symptomatic from this meningioma.    • His Parkinson's seems to be under good control.    • We will see him in follow-up in 1 year    01/23/2020 - MRI Brain with and without contrast:  • Reidentified extra-axial dural based enhancing mass along the right frontal convexity. This lesion measures up to 3 cm in width and up to 1.7 cm in height (series [id number] [id number]).  On the prior exam this measured approximately 2.7 cm in width by 1.5 cm in height.   • On the sagittal images this measures up to 3.4 cm in its greatest AP extent, as compared to 2.8 cm on the prior exam. No other enhancing lesions identified.   • No evidence of acute ischemia.   • No intra-axial or extra-axial hemorrhage.  • Advanced chronic small vessel ischemic change with associated global cortical atrophy.   IMPRESSION:  • Reidentified meningioma along the right frontal convexity, mildly increased in size (3.4 x 3.0 x 1.7 cm, up from 2.8 x 2.7 x 1.5 cm).  • Advanced chronic small vessel ischemic change.    01/23/2020 - Appointment with :  • MRI of the brain without contrast shows interval increase in size of the right convexity meningioma compared to 2017 and 2018.    • At this point I would recommend consultation with Dr. Joseph for stereotactic radiation treatments to this tumor.    • We discussed the relative risks and benefits of continued watchful waiting versus radiation treatments.    • I think  the risks of radiation right now are lower than the risk of no treatment.    • The patient acknowledged understanding of this.  Their questions and concerns were addressed.    • We will get him referred to Dr. Joseph and scheduled for follow-up imaging in about 8 months  • Return in about 8 months (around 9/23/2020) for follow up w/scan - DR SMITH (BRAIN).     01/30/2020 - Consult with :  • After careful consideration of the available diagnostic data and examination of the patient, I am recommending a radiation therapy course of stereotactic radiosurgery to the right frontal lobe of the brain which will consist of 5 treatment fractions, total dose will be determined with treatment planning which will be completed with neurosurgeon for coordination of care to identify the target volumes. SRS protocol MRI of the brain will be obtained to assist with planning.  • The patient and family verbalize understanding of this discussion, voice no further questions and wish to proceed with recommended therapy.    • We will simulate treatment fields to initiate the treatment planning with the intent to begin treatments approximately March 1st at the patients request.    • We are treating with a curative intent.     02/16/2020 - Presented to the [facility] ED with acute onset of unsteady gait, left-sided weakness, and aphasia. Patient was admitted for further work-up and management of acute CVA.    02/19/2020 - Discharged to Rehab unit at [facility].     02/19/2020 - 03/05/2020 - In patient rehab at [facility].     09/24/2020 - MRI Brain with and without contrast:  • The homogeneously enhancing 3.4 x 2.1 x 1.9 cm extra-axial lesion adjacent the right frontal lobe remains stable compared to 1/23/2020. This results in similar effacement of the underlying sulci.  • No new abnormal intracranial enhancement is visualized.   • Extensive scattered hyperintense T2/FLAIR signal changes the periventricular white matter regions may be  secondary to advanced chronic small vessel ischemia.   • Old lacunar infarcts considered of the thalami.   • No new intra-axial signal changes.   • No abnormal extra-axial fluid collection.  IMPRESSION:  • The meningioma along the right frontal convexity is similar compared to 1/23/2020. Measurements provided above.  • Stable nonenhancing hyperintense FLAIR signal changes likely due to advanced chronic small vessel ischemia.    09/24/2020 - Appointment with :  • MRI of the brain with and without contrast shows a stable right convexity meningioma.    • The patient's meningioma over the last 3 years has demonstrated some steady increase in size.    • We again would make a referral back to Dr. Joseph for consideration of radiation treatments to arrest the continued progression of the tumor.    • I do not think he is symptomatic from this at this point.    • We will see him in follow-up in about 8 months.     History obtained from  PATIENT, FAMILY and CHART    PAST MEDICAL HISTORY  Past Medical History:   Diagnosis Date   • BPH with urinary obstruction    • Coronary artery disease     WITH HISTORY OF MYOCARDIAL INFARCTION W/OUT HISTORY OF CABG   • Diverticulitis of colon    • Intrinsic sphincter deficiency (ISD)    • Myocardial infarction (CMS/HCC)    • Nocturia    • Parkinson's disease (CMS/HCC) march 2018   • Prostate cancer (CMS/HCC)    • Renal cyst, right    • Urge incontinence    • Urinary frequency       PAST SURGICAL HISTORY  Past Surgical History:   Procedure Laterality Date   • BACK SURGERY     • CARDIAC CATHETERIZATION     • NECK SURGERY     • PROSTATE SURGERY        FAMILY HISTORY  family history includes Colon cancer in his brother; No Known Problems in his father and mother.     SOCIAL HISTORY  Social History     Tobacco Use   • Smoking status: Never Smoker   • Smokeless tobacco: Never Used   Substance Use Topics   • Alcohol use: No   • Drug use: No     ALLERGIES  Proscar [finasteride],  Scopolamine, and Sulfa antibiotics     MEDICATIONS    Current Outpatient Medications:   •  carbidopa-levodopa CR (SINEMET CR)  MG per CR tablet, Take 1 tablet by mouth 4 (Four) Times a Day., Disp: , Rfl:   •  carvedilol (COREG) 6.25 MG tablet, Take 6.25 mg by mouth 2 (Two) Times a Day., Disp: , Rfl:   •  clopidogrel (PLAVIX) 75 MG tablet, TAKE 1 TABLET BY MOUTH EVERY DAY, Disp: , Rfl:   •  CVS VITAMIN B-12 1000 MCG tablet, Take 1,000 mcg by mouth Daily., Disp: , Rfl:   •  DULoxetine (CYMBALTA) 60 MG capsule, Take 60 mg by mouth Daily., Disp: , Rfl:   •  lisinopril (PRINIVIL,ZESTRIL) 40 MG tablet, TAKE 1 TABLET BY MOUTH EVERY DAY, Disp: , Rfl:     The following portions of the patient's history were reviewed and updated as appropriate: allergies, current medications, past family history, past medical history, past social history, past surgical history and problem list.    Current outpatient and discharge medications have been reconciled for the patient.  Reviewed by: Roosevelt Joseph III, MD    REVIEW OF SYSTEMS  Review of Systems   Constitutional: Positive for fatigue. Negative for activity change, appetite change, chills, diaphoresis, fever and unexpected weight change.   HENT: Negative.         Hearing aids bilaterally   Eyes: Negative.    Respiratory: Negative.    Cardiovascular: Negative.    Gastrointestinal: Negative.    Endocrine: Negative.    Genitourinary: Negative.    Musculoskeletal: Negative.         Uses a cane  History of falls  History of CVA   Skin: Negative.    Allergic/Immunologic: Negative.    Neurological: Positive for tremors and light-headedness. Negative for dizziness, seizures, syncope, facial asymmetry, speech difficulty, weakness, numbness and headaches.        Parkinson's  Some hallucinations  Frequent falls   Hematological: Negative.    Psychiatric/Behavioral: Negative.         PHYSICAL EXAM  VITAL SIGNS:   Vitals:    10/08/20 1511   BP: 140/86   Pulse: 73   Weight: 74.4 kg (164 lb)  "  Height: 182.9 cm (72\")   PainSc:   6   PainLoc: Comment: \"Ache all over\"     General:  Alert and oriented, no acute distress, well developed, Vitals reviewed.  Head:  Normocephalic, without obvious abnormality    Nose/Sinuses:  Nares normal externally, no sinus tenderness.  Mouth/Throat:  Mucosa moist, pharynx without erythema  Neck:  supple, No evidence of adenopathy in the cervical or supraclavicular areas.  Eyes: No gross abnormalities   Ears: Ears intact with no external abnormalities noted  Chest:  Respiratory efforts are normal and unlabored, chest is clear to auscultation.  Cardiovascular: Regular rate and rhythm without murmurs, rubs, or gallops.   Abdomen:  Soft, non-tender, normal bowel sounds; no CVA tenderness   Extremities:  SHELTON well, warm to touch, no evidence of cyanosis or edema.  Skin: No suspicious lesions or rashes of concern  Neurologic:  Alert and oriented, non focal exam  Psych: Mood and affect are appropriate    Performance Status: ECOG (1) Restricted in physically strenuous activity, ambulatory and able to do work of light nature    Clinical Quality Measures  -Pain Documented by Standardized Tool, FPS Asher Torres reports a pain score of 6.  Given his pain assessment as noted, treatment options were discussed and the following options were decided upon as a follow-up plan to address the patient's pain: continuation of current treatment plan for pain.   Pain Medications             DULoxetine (CYMBALTA) 60 MG capsule Take 60 mg by mouth Daily.        -Advanced Care Planning -   Advance Care Planning   ACP discussion was held with the patient during this visit. Patient does not have an advance directive, information provided.     -Body Mass Index Screening and Follow-Up Plan Patient's Body mass index is 22.24 kg/m². BMI is within normal parameters. No follow-up required..  -Tobacco Use: Screening and Cessation Intervention Social History    Tobacco Use      Smoking status: Never Smoker      " Smokeless tobacco: Never Used    ASSESSMENT AND PLAN  1. Benign meningioma (CMS/HCC)    2. Hx of malignant neoplasm of prostate    3. Non-smoker      RECOMMENDATIONS: Asher Torres is a very pleasant male that has been referred back to our office by Jimmy Raphael MD to discuss SBRT to the meningioma, seen in our clinic on 01/30/2020 and unfortunately, he had a CVA on 02/16/2020.     Diagnosed in March 2017 right frontal meningioma, 2.3 cm monitored by Dr. Raphael.  Routine follow-up imaging showed a very slight asymptomatic increase in size on until 01/23/2020, MRI of brain reidentified extra-axial dural based enhancing mass along the right frontal convexity 3.4 cm compared to 2.8 cm previously on sagittal views.    • 01/23/2020 - MRI Brain enhancing mass along the right frontal convexity, measures up to 3.4 cm, previously 2.8 cm. Planned for SRS on 01/30/2020, acute CVA on 02/16/2020.  • 09/24/2020 - Follow up Stealth MRI Brain reveals the stable enhancing 3.4 x 2.1 x 1.9 cm extra-axial lesion adjacent the right frontal lobe remains stable with effacement of the underlying sulci. Referral to radiation oncology for steady increase in size over 3 years.    We discussed the role of radiation therapy with this diagnosis as well as the indications and rationale according to the NCCN Guidelines. We have reviewed the risks and benefits of stereotactic radiosurgery vs whole brain radiation, I have extensively reviewed the risks, benefits and alternatives of therapy. Risks include headaches, hair loss in the area treated, nausea, vomiting, fatigue, hearing loss, skin and scalp changes, trouble with memory and speech, seizures and progression of disease in spite of therapy with either local or systemic failure.  I have seen, examined and reviewed this patient's medication list, appropriate labs and imaging studies, reviewed relevant medical records and discussed the goals/plans of care with the patient and family and  answered all questions.     I am recommending stereotactic radiosurgery to the meningioma, total dose and number of treatment fractions will be determined. Treatment planning will be completed with neurosurgeon identify the target volumes.      The patient and family verbalize understanding of this discussion, voice no further questions and wish to proceed with recommended therapy.  We will simulate treatment fields to initiate the treatment planning with the intent to begin treatments as soon as possible. Thank you for allowing me to assist in this patients care.    Today's appointment time was spent in counseling and coordination of care as follows: diagnosis, intent of treatment, discussing radiation therapy specifics: logistics, possible and probable side effects and after effects, staging of cancer, standard of care for this stage of this cancer and treatment options  Roosevelt Joseph III, MD  10/08/2020

## 2020-10-08 ENCOUNTER — HOSPITAL ENCOUNTER (OUTPATIENT)
Dept: RADIATION ONCOLOGY | Facility: HOSPITAL | Age: 75
Setting detail: RADIATION/ONCOLOGY SERIES
Discharge: HOME | End: 2020-10-08

## 2020-10-08 ENCOUNTER — CONSULT (OUTPATIENT)
Dept: RADIATION ONCOLOGY | Facility: HOSPITAL | Age: 75
End: 2020-10-08

## 2020-10-08 VITALS
HEIGHT: 72 IN | BODY MASS INDEX: 22.21 KG/M2 | WEIGHT: 164 LBS | SYSTOLIC BLOOD PRESSURE: 140 MMHG | DIASTOLIC BLOOD PRESSURE: 86 MMHG | HEART RATE: 73 BPM

## 2020-10-08 DIAGNOSIS — Z78.9 NON-SMOKER: ICD-10-CM

## 2020-10-08 DIAGNOSIS — D32.9 BENIGN MENINGIOMA (HCC): Primary | ICD-10-CM

## 2020-10-08 DIAGNOSIS — Z85.46 HX OF MALIGNANT NEOPLASM OF PROSTATE: ICD-10-CM

## 2020-10-08 PROBLEM — I63.9 ACUTE CVA (CEREBROVASCULAR ACCIDENT) (HCC): Status: ACTIVE | Noted: 2020-02-17

## 2020-10-08 NOTE — PATIENT INSTRUCTIONS
Meningioma  Meningioma is a tumor that occurs in the thin tissue that covers the brain and spinal cord (meninges). Meningiomas are usually not cancerous (benign) and do not spread to other areas. In rare cases, a meningioma may become cancerous (malignant).  What are the causes?  In many cases, the cause of this condition is not known. In some cases, meningioma may be caused by:  · Having a genetic disorder that causes multiple soft tumors (neurofibromatosis 2).  · A change in certain genes (genetic mutation).  What increases the risk?  You are more likely to develop this condition if:  · You have been exposed to radiation.  · You are an older woman. Older women have a higher risk of meningiomas than men or children. However, men have a higher risk of malignant meningiomas.  · You have injured your head in the past.  · You have a history of breast cancer.  What are the signs or symptoms?  Symptoms of this condition usually begin very slowly. The symptoms may depend on the size and location of the tumor. Possible symptoms include:  · Headaches.  · Nausea and vomiting.  · Vision changes.  · Hearing changes.  · Loss of the sense of smell.  · Fits of uncontrolled movements (seizures).  · Weakness or numbness on one side of the body or in an arm or leg.  · Mood or personality changes.  · Problems with memory or thinking.  How is this diagnosed?  This condition is diagnosed based on:  · Results of brain imaging tests, such as a CT scan or MRI.  · Removal and testing of a sample of the tumor (biopsy). This may be done to confirm the diagnosis and to help determine the best treatment for the condition.  How is this treated?  You may not have treatment until your symptoms start to affect your daily activities. This is because meningioma grows so slowly, and your health care provider may prefer to monitor its growth before starting treatment. If you do need treatment, it may include:  · Medicines to decrease brain swelling  and improve symptoms (steroids).  · High-energy rays (radiation therapy) to shrink or kill the tumor.  · Anti-cancer medicines (chemotherapy) to shrink or kill the tumor. Chemotherapy has many side effects because it also kills healthy cells.  · Targeted therapy. This kills cancerous cells without affecting normal cells.  · Surgery to remove as much of the tumor as possible.  Follow these instructions at home:    · Take over-the-counter and prescription medicines only as told by your health care provider.  · Keep all follow-up visits as told by your health care provider. This is important. You may need regular visits to monitor the growth of your tumor.  Contact a health care provider if:  · You have symptoms that come back.  · You have diarrhea.  · You vomit.  · You have abdominal pain.  · You cannot eat or drink as much as you need.  · You are weaker or more tired than usual.  · You are losing weight without trying.  Get help right away if:  · Your diarrhea, vomiting, or abdominal pain does not go away.  · You have new symptoms, such as vision problems or difficulty walking.  · You have a seizure.  · You have bleeding that does not stop.  · You have trouble breathing.  · You have a fever.  Summary  · Meningioma is a tumor that occurs in the thin tissue that covers the brain and spinal cord (meninges).  · Meningiomas are usually benign, which means they are not cancerous and do not spread to other areas.  · Symptoms of this condition usually begin very slowly. The symptoms may depend on the size and location of the tumor.  · Your tumor may be monitored over time. You may not need treatment until your tumor starts to affect your daily life.  This information is not intended to replace advice given to you by your health care provider. Make sure you discuss any questions you have with your health care provider.  Document Released: 12/23/2014 Document Revised: 11/30/2018 Document Reviewed: 12/22/2017  [employer]  Education © 2020 [employer]

## 2020-10-15 PROCEDURE — 77300 RADIATION THERAPY DOSE PLAN: CPT | Performed by: RADIOLOGY

## 2020-10-15 PROCEDURE — 77338 DESIGN MLC DEVICE FOR IMRT: CPT | Performed by: RADIOLOGY

## 2020-10-15 PROCEDURE — 77301 RADIOTHERAPY DOSE PLAN IMRT: CPT | Performed by: RADIOLOGY

## 2020-10-22 NOTE — TELEPHONE ENCOUNTER
Pt requested a new script that reflect the Sinemet dose increase to 4 tablets daily per the last office note.

## 2020-10-23 RX ORDER — CARBIDOPA AND LEVODOPA 50; 200 MG/1; MG/1
TABLET, EXTENDED RELEASE ORAL
Qty: 120 TABLET | Refills: 5 | Status: SHIPPED | OUTPATIENT
Start: 2020-10-23 | End: 2021-03-24

## 2020-10-27 ENCOUNTER — HOSPITAL ENCOUNTER (OUTPATIENT)
Dept: RADIATION ONCOLOGY | Facility: HOSPITAL | Age: 75
Setting detail: RADIATION/ONCOLOGY SERIES
Discharge: HOME | End: 2020-10-27

## 2020-10-27 PROCEDURE — 77373 STRTCTC BDY RAD THER TX DLVR: CPT | Performed by: RADIOLOGY

## 2020-10-27 NOTE — TELEPHONE ENCOUNTER
have much in the way of tremor but for the past year or so has had hypophonia, bradykinesia and a tendency to shuffle and not swinging his arms. He does complain of some chronic numbness in his feet and some trouble with memory. He does not have diabetes. Kam Gonzalez Restless leg symptoms are fairly well controlled. There is a family history of this. He tried [place] in the past but is no longer on it. He was admitted to the rehab unit 2/20 for a right thalamic ischemic stroke. Takes Plavix. Active Ambulatory Problems     Diagnosis Date Noted    Neuropathy      Benign meningioma ([facility] 03/29/2017    Memory difficulties 03/29/2017    Non-smoker 03/29/2017    Normal body mass index (BMI) 03/29/2017    OAB (overactive bladder) 10/19/2017    Primary Parkinsonism ([address] 03/29/2017    Prostate cancer ([facility] [address] 10/10/2016    BARBARA (stress urinary incontinence), male 10/10/2016    Acute CVA (cerebrovascular accident) ([address] 02/17/2020    Patent foramen ovale      Acute ischemic stroke ([facility] 02/19/2020    Weakness 02/29/2020    Confusion 02/29/2020    Other insomnia 02/29/2020    History of stroke 06/01/2020    Left ventricular hypertrophy 06/01/2020           Resolved Ambulatory Problems     Diagnosis Date Noted    No Resolved Ambulatory Problems           Past Medical History:   Diagnosis Date    Cancer ([address]      Parkinson disease ([facility] [address] [facility]      Vitamin B12 deficiency           Past Surgical History         Past Surgical History:   Procedure Laterality Date    BACK SURGERY        MOHS SURGERY        NECK SURGERY        PROSTATECTOMY               Family History   History reviewed.  No pertinent family history.              Allergies   Allergen Reactions    Finasteride      Other Other (See Comments)       ANTIHISTAMINES - TYPE UNKNOWN  ANTIHISTAMINES - TYPE UNKNOWN  ANTIHISTAMINES - TYPE UNKNOWN    Scopolamine Other (See Comments)       Scopolamine patch-- caused hallucination and confusion    Sulfa Antibiotics           Social History               Socioeconomic History    Marital status:        Spouse name: Not on file    Number of children: Not on file    Years of education: Not on file    Highest education level: Not on file   Occupational History    Not on file   Social Needs    Financial resource strain: Not on file    Food insecurity       Worry: Not on file       Inability: Not on file    Transportation needs       Medical: Not on file       Non-medical: Not on file   Tobacco Use    Smoking status: Never Smoker    Smokeless tobacco: Never Used   Substance and Sexual Activity    Alcohol use: No    Drug use: No    Sexual activity: Not on file   Lifestyle    Physical activity       Days per week: Not on file       Minutes per session: Not on file    Stress: Not on file   Relationships    Social connections       Talks on phone: Not on file       Gets together: Not on file       Attends [employer] service: Not on file       Active member of club or organization: Not on file       Attends meetings of clubs or organizations: Not on file       Relationship status: Not on file    Intimate partner violence       Fear of current or ex partner: Not on file       Emotionally abused: Not on file       Physically abused: Not on file       Forced sexual activity: Not on file   Other Topics Concern    Not on file   Social History Narrative      50+ years     He has 2 daughters     Never in the [employer]     He was a brick salesman     [address] [place] [address]     Presently not driving     No special hobbies or interest     Physically sedentary     No history of alcohol or tobacco consumption or substance usage           Review of Systems     Constitutional - No fever or chills.  No diaphoresis or significant fatigue. HENT -  No tinnitus or significant hearing loss.   Eyes - no sudden vision change or eye pain  Respiratory - no significant shortness of breath or cough  Cardiovascular - no chest pain No palpitations or significant leg swelling  Gastrointestinal - no abdominal swelling or pain.    Genitourinary - No difficulty urinating, dysuria  Musculoskeletal - no back pain or myalgia. Skin - no color change or rash  Neurologic - No seizures.  No lateralizing weakness. Hematologic - no easy bruising or excessive bleeding. Psychiatric - no severe anxiety or nervousness. All other review of systems are negative.           Current Facility-Administered Medications          Current Outpatient Medications   Medication Sig Dispense Refill    carbidopa-levodopa (SINEMET CR)  MG per extended release tablet TAKE 1 TABLET BY MOUTH THREE TIMES A DAY 90 tablet 5    DULoxetine (CYMBALTA) 30 MG extended release capsule TAKE 1 CAPSULE BY MOUTH EVERY DAY 30 capsule 5    carvedilol (COREG) 6.25 MG tablet TAKE 1 TABLET BY MOUTH TWICE A DAY WITH MEALS 60 tablet 5    lisinopril (PRINIVIL;ZESTRIL) 40 MG tablet TAKE 1 TABLET BY MOUTH EVERY DAY 30 tablet 5    clopidogrel (PLAVIX) 75 MG tablet TAKE 1 TABLET BY MOUTH EVERY DAY 30 tablet 11    cyanocobalamin ([employer] VITAMIN B12) 1000 MCG tablet Take 1,000 mcg by mouth daily          No current facility-administered medications for this visit.                  BP (!) 141/96   Pulse 66   Ht 6' (1.829 m)   Wt 166 lb (75.3 kg)   BMI 22.51 kg/m²        Constitutional - well developed, well nourished. Eyes - conjunctiva normal.  Pupils react to light  Ear, nose, throat -hearing intact to finger rub No scars, masses, or lesions over external nose or ears, no atrophy of tongue  Neck-symmetric, no masses noted, no jugular vein distension. No bruits noted. Respiration- chest wall appears symmetric, good expansion,   normal effort without use of accessory muscles  Cardiovascular- RRR  Musculoskeletal - no significant wasting of muscles noted, no bony deformities, gait no gross ataxia  Extremities-no clubbing, cyanosis or edema  Skin - warm, dry, and intact.   No rash, erythema, or pallor. Psychiatric - mood, affect, and behavior appear normal.       Neurological exam  Awake, alert, fluent oriented x 3 appropriate affect  Attention and concentration appear appropriate  . Followed complex commands well. Speech shows hypophonia but no dysarthria  No clear issues with language of fund of knowledge     Cranial Nerve Exam   CN II- Visual fields grossly unremarkable. VA adequate. PERRLA. CN III, IV,VI-EOMI, No nystagmus, conjugate eye movements, no ptosis  CN VII-no facial asymmetry. He has a masked face with decreased blink rate  CN VIII-Hearing intact         Motor Exam  V/V throughout upper and lower extremities bilaterally, has bradykinesia which is worse on the Right. Some cogwheeling is seen on the right.        Reflexes   Absent throughout        Tremors- no tremors in hands or head noted     Gait  Very slow with some festination and difficulty lifting his feet. Somewhat unsteady. Turns en bloc. Coordination  Finger to nose-unremarkable              Assessment      ICD-10-CM     1. Parkinson disease ([address]  G20     2. History of meningioma of the brain  Z86.011     3. Restless leg syndrome  G25.81     4. History of recent stroke  Z86.73     5. Neuropathy  G62.9     No further strokelike symptoms. Neuropathy and restless legs are stable. Meningioma followed by DrsLeslie Coelho and Dennis Frausto. His Parkinson's disease is slowly progressing. Ataxia is related to Parkinson's disease, multiple strokes and neuropathy. He will try increasing his Sinemet to 4 times a day. Exercise was stressed. Plan           Return in about 3 months (around 1/5/2021).    (Please note that portions of this note were completed with a voice recognition program. Efforts were made to edit the dictations but occasionally words are mis-transcribed.)               Other Notes   All notes       Progress Notes from [place], [place]   Instructions         Return in about 3 months (around 1/5/2021).     After

## 2020-10-28 RX ORDER — LISINOPRIL 40 MG/1
TABLET ORAL
Qty: 30 TABLET | Refills: 5 | Status: SHIPPED | OUTPATIENT
Start: 2020-10-28

## 2020-10-29 ENCOUNTER — HOSPITAL ENCOUNTER (OUTPATIENT)
Dept: RADIATION ONCOLOGY | Facility: HOSPITAL | Age: 75
Setting detail: RADIATION/ONCOLOGY SERIES
Discharge: HOME | End: 2020-10-29

## 2020-10-29 PROCEDURE — 77373 STRTCTC BDY RAD THER TX DLVR: CPT | Performed by: RADIOLOGY

## 2020-11-02 ENCOUNTER — HOSPITAL ENCOUNTER (OUTPATIENT)
Dept: RADIATION ONCOLOGY | Facility: HOSPITAL | Age: 75
Setting detail: RADIATION/ONCOLOGY SERIES
End: 2020-11-02

## 2020-11-02 ENCOUNTER — HOSPITAL ENCOUNTER (OUTPATIENT)
Dept: RADIATION ONCOLOGY | Facility: HOSPITAL | Age: 75
Setting detail: RADIATION/ONCOLOGY SERIES
Discharge: HOME | End: 2020-11-02

## 2020-11-02 PROCEDURE — 77373 STRTCTC BDY RAD THER TX DLVR: CPT | Performed by: RADIOLOGY

## 2020-11-04 PROCEDURE — 77336 RADIATION PHYSICS CONSULT: CPT | Performed by: RADIOLOGY

## 2020-11-06 ENCOUNTER — HOSPITAL ENCOUNTER (OUTPATIENT)
Dept: RADIATION ONCOLOGY | Facility: HOSPITAL | Age: 75
Setting detail: RADIATION/ONCOLOGY SERIES
Discharge: HOME | End: 2020-11-06

## 2020-11-06 PROCEDURE — 77373 STRTCTC BDY RAD THER TX DLVR: CPT | Performed by: RADIOLOGY

## 2020-11-09 ENCOUNTER — HOSPITAL ENCOUNTER (OUTPATIENT)
Dept: RADIATION ONCOLOGY | Facility: HOSPITAL | Age: 75
Setting detail: RADIATION/ONCOLOGY SERIES
Discharge: HOME | End: 2020-11-09

## 2020-11-09 PROCEDURE — 61796 SRS CRANIAL LESION SIMPLE: CPT | Performed by: NEUROLOGICAL SURGERY

## 2020-11-09 PROCEDURE — 77373 STRTCTC BDY RAD THER TX DLVR: CPT | Performed by: RADIOLOGY

## 2020-11-10 NOTE — OP NOTE
Procedure Note  Preop Diagnosis: Meningioma    Postop diagnosis: Meningioma     Procedure Name:Stereotactic radiosurgery treatment    Indications:  A MRI of the brain revealed findings of right frontal meningioma. The patient now presents for Stereotactic radiosurgery treatment after discussing therapeutic alternatives.          Surgeon: Jimmy Raphael MD     Assistants: None    Anesthesia: None    ASA Class: 3    Procedure Details:  After obtaining informed consent, having the risks and benefits of the procedure explained including but not limited to brain swelling, intracranial hemorrhage, seizure, necrosis. The patient was brought to the Stereotactic radiosurgery suite.  They were fitted for an aquaplast facemask. CT and MRI images were fused and inspected for accuracy.  Right frontal meningioma measuring 3.4 x 2.1 cm was  identified in the right frontal lobe.  The dosing prescribed.  The patient then received 2500 cGy in  5 fractions. The patient tolerated the treatment well and was discharged home without difficulty.    Findings:  Brain Tumor    Estimated Blood Loss:  none           Drains: none           Total IV Fluids: ml           Specimens: None           Implants: None           Complications:  none           Disposition: Discharged home           Condition: stable        Jimmy Raphael MD

## 2021-01-07 ENCOUNTER — OFFICE VISIT (OUTPATIENT)
Dept: NEUROLOGY | Age: 76
End: 2021-01-07
Payer: MEDICARE

## 2021-01-07 VITALS
SYSTOLIC BLOOD PRESSURE: 161 MMHG | WEIGHT: 153 LBS | DIASTOLIC BLOOD PRESSURE: 104 MMHG | HEIGHT: 72 IN | TEMPERATURE: 97.5 F | BODY MASS INDEX: 20.72 KG/M2 | HEART RATE: 62 BPM

## 2021-01-07 DIAGNOSIS — G20 PARKINSON DISEASE (HCC): Primary | ICD-10-CM

## 2021-01-07 DIAGNOSIS — Z86.011 HISTORY OF MENINGIOMA OF THE BRAIN: ICD-10-CM

## 2021-01-07 DIAGNOSIS — R44.1 VISUAL HALLUCINATIONS: ICD-10-CM

## 2021-01-07 DIAGNOSIS — G62.9 NEUROPATHY: ICD-10-CM

## 2021-01-07 PROCEDURE — G8420 CALC BMI NORM PARAMETERS: HCPCS | Performed by: PSYCHIATRY & NEUROLOGY

## 2021-01-07 PROCEDURE — 1036F TOBACCO NON-USER: CPT | Performed by: PSYCHIATRY & NEUROLOGY

## 2021-01-07 PROCEDURE — G8484 FLU IMMUNIZE NO ADMIN: HCPCS | Performed by: PSYCHIATRY & NEUROLOGY

## 2021-01-07 PROCEDURE — G8427 DOCREV CUR MEDS BY ELIG CLIN: HCPCS | Performed by: PSYCHIATRY & NEUROLOGY

## 2021-01-07 PROCEDURE — 4040F PNEUMOC VAC/ADMIN/RCVD: CPT | Performed by: PSYCHIATRY & NEUROLOGY

## 2021-01-07 PROCEDURE — 99214 OFFICE O/P EST MOD 30 MIN: CPT | Performed by: PSYCHIATRY & NEUROLOGY

## 2021-01-07 PROCEDURE — 1123F ACP DISCUSS/DSCN MKR DOCD: CPT | Performed by: PSYCHIATRY & NEUROLOGY

## 2021-01-07 NOTE — PROGRESS NOTES
Diagnosis Date Noted    Neuropathy     [facility] 03/29/2017    Memory difficulties 03/29/2017    Non-smoker 03/29/2017    Normal body mass index (BMI) 03/29/2017    OAB (overactive bladder) 10/19/2017    Primary Parkinsonism ([facility] [address] 03/29/2017    Prostate cancer ([facility] 10/10/2016    BARBARA (stress urinary incontinence), male 10/10/2016    Acute CVA (cerebrovascular accident) ([facility] [facility] 02/17/2020    Patent foramen ovale     Acute ischemic stroke ([facility] 02/19/2020    Weakness 02/29/2020    Confusion 02/29/2020    Other insomnia 02/29/2020    History of stroke 06/01/2020    Left ventricular hypertrophy 06/01/2020     Resolved Ambulatory Problems     Diagnosis Date Noted    No Resolved Ambulatory Problems     Past Medical History:   Diagnosis Date    Cancer [facility]     Parkinson disease ([facility]     Vitamin B12 deficiency        Past Surgical History:   Procedure Laterality Date    BACK SURGERY      MOHS SURGERY      NECK SURGERY      PROSTATECTOMY         No family history on file.     Allergies   Allergen Reactions    Finasteride     Other Other (See Comments)     ANTIHISTAMINES - TYPE UNKNOWN  ANTIHISTAMINES - TYPE UNKNOWN  ANTIHISTAMINES - TYPE UNKNOWN    Scopolamine Other (See Comments)     Scopolamine patch-- caused hallucination and confusion    Sulfa Antibiotics        Social History     Socioeconomic History    Marital status:      Spouse name: Not on file    Number of children: Not on file    Years of education: Not on file    Highest education level: Not on file   Occupational History    Not on file   Social Needs    Financial resource strain: Not on file    Food insecurity     Worry: Not on file     Inability: Not on file    Transportation needs     Medical: Not on file     Non-medical: Not on file   Tobacco Use    Smoking status: Never Smoker    Smokeless tobacco: Never Used   Substance and Sexual Activity    Alcohol use: No    Drug use: No    Sexual activity: Not on file   Lifestyle    Physical activity     Days per week: Not on file     Minutes per session: Not on file    Stress: Not on file   Relationships    Social connections     Talks on phone: Not on file     Gets together: Not on file     Attends Baptism service: Not on file     Active member of club or organization: Not on file     Attends meetings of clubs or organizations: Not on file     Relationship status: Not on file    Intimate partner violence     Fear of current or ex partner: Not on file     Emotionally abused: Not on file     Physically abused: Not on file     Forced sexual activity: Not on file   Other Topics Concern    Not on file   Social History Narrative     50+ years    He has 2 daughters    Never in the [employer]    He was a [employer]    Presently not driving    No special hobbies or interest    Physically sedentary    No history of alcohol or tobacco consumption or substance usage       Review of Systems     Constitutional: []? Fever []? Sweats []? Chills []? Recent Injury   [x]? Denies all unless marked  HENT:[]? Headache  []? Head Injury  []? Sore Throat  []? Ear Pain  []? Dizziness []? Hearing Loss   [x]? Denies all unless marked  Musculoskeletal: []? Arthralgia  []? Myalgias []? Muscle cramps  []? Muscle twitches   [x]? Denies all unless marked   Spine:  []? Neck pain  []? Back pain  []? Sciaticia  [x]? Denies all unless marked  Neurological:[]? Visual Disturbance []? Double Vision []? Slurred Speech []? Trouble swallowing  []? Vertigo []? Tingling []? Numbness []? Weakness []? Loss of Balance   []? Loss of Consciousness []? Memory Loss []? Seizures  [x]? Denies all unless marked  Psychiatric/Behavioral:[]? Depression []? Anxiety  [x]? Denies all unless marked  Sleep: []? Insomnia []? Sleep Disturbance []? Snoring []? Restless Legs []? Daytime Sleepiness []? Sleep Apnea  [x]?  Denies all unless marked             Current Outpatient Medications Medication Sig Dispense Refill    lisinopril (PRINIVIL;ZESTRIL) 40 MG tablet TAKE 1 TABLET BY MOUTH EVERY DAY 30 tablet 5    carbidopa-levodopa (SINEMET CR)  MG per extended release tablet TAKE 1 TABLET BY MOUTH FOUR TIMES A  tablet 5    DULoxetine (CYMBALTA) 30 MG extended release capsule TAKE 1 CAPSULE BY MOUTH EVERY DAY 30 capsule 5    carvedilol (COREG) 6.25 MG tablet TAKE 1 TABLET BY MOUTH TWICE A DAY WITH MEALS 60 tablet 5    clopidogrel (PLAVIX) 75 MG tablet TAKE 1 TABLET BY MOUTH EVERY DAY 30 tablet 11    cyanocobalamin ([employer] VITAMIN B12) 1000 MCG tablet Take 1,000 mcg by mouth daily       No current facility-administered medications for this visit. BP (!) 161/104 (Site: Right Upper Arm, Position: Sitting, Cuff Size: Medium Adult) Comment: pt wife says bp runs high  Pulse 62   Temp 97.5 °F (36.4 °C)   Ht 6' (1.829 m)   Wt 153 lb (69.4 kg)   BMI 20.75 kg/m²       Constitutional - well developed, well nourished. Eyes - conjunctiva normal.  Pupils react to light  Ear, nose, throat -hearing intact to finger rub No scars, masses, or lesions over external nose or ears, no atrophy of tongue  Neck-symmetric, no masses noted, no jugular vein distension. No bruits noted. Respiration- chest wall appears symmetric, good expansion,   normal effort without use of accessory muscles  Cardiovascular- RRR  Musculoskeletal - no significant wasting of muscles noted, no bony deformities, gait no gross ataxia  Extremities-no clubbing, cyanosis or edema  Skin - warm, dry, and intact. No rash, erythema, or pallor. Psychiatric - mood, affect, and behavior appear normal.      Neurological exam  Awake, alert, fluent oriented x 3 appropriate affect  Attention and concentration appear appropriate  . Followed complex commands well. Speech shows hypophonia but no dysarthria  No clear issues with language of fund of knowledge    Cranial Nerve Exam   CN II- Visual fields grossly unremarkable.  VA adequate. PERRLA. CN III, IV,VI-EOMI, No nystagmus, conjugate eye movements, no ptosis  CN VII-no facial asymmetry. He has a masked face with decreased blink rate  CN VIII-Hearing intact       Motor Exam  V/V throughout upper and lower extremities bilaterally, has bradykinesia which is worse on the Right. Some cogwheeling is seen on the right. Reflexes   Absent throughout      Tremors- no tremors in hands or head noted    Gait  Somewhat unsteady. Feet are close together. Decreased arm swing. Uses a cane. Coordination  Finger to nose-unremarkable          Assessment    ICD-10-CM    1. Parkinson disease ([facility]  G20    2. History of meningioma of the brain  Z86.011    3. Neuropathy  G62.9    4. Visual hallucinations  R44.1    No further strokelike symptoms. Neuropathy and restless legs are stable. Meningioma followed by Yaima Tenorio and Laurence salazar. His Parkinson's disease is slowly progressing. Hallucinations likely due to medications but it does help his mobility. I have not made any changes in his medications today. Her size was stressed. Plan        Return in about 3 months (around 4/7/2021).     (Please note that portions of this note were completed with a voice recognition program. Efforts were made to edit the dictations but occasionally words are mis-transcribed.)

## 2021-01-11 RX ORDER — CARVEDILOL 6.25 MG/1
TABLET ORAL
Qty: 60 TABLET | Refills: 5 | Status: SHIPPED | OUTPATIENT
Start: 2021-01-11 | End: 2021-07-08

## 2021-01-11 NOTE — TELEPHONE ENCOUNTER
Requested Prescriptions     Pending Prescriptions Disp Refills    carvedilol (COREG) 6.25 MG tablet [Pharmacy Med Name: CARVEDILOL 6.25 MG TABLET] 60 tablet 5     Sig: TAKE 1 TABLET BY MOUTH TWICE A DAY WITH MEALS       Last Office Visit: 10/5/2020  Next Office Visit: 4/7/21  Last Medication Refill: 7/15/20 with 5 refills

## 2021-02-03 ENCOUNTER — TELEPHONE (OUTPATIENT)
Dept: CARDIOLOGY | Age: 76
End: 2021-02-03

## 2021-02-03 NOTE — TELEPHONE ENCOUNTER
Called patient to schedule PFO closure. Spoke with patient's wife and daughter who stated they did not want to proceed with this procedure at this time. He has  other health problems going on currently. They will call if they change their mind.

## 2021-02-22 RX ORDER — DULOXETIN HYDROCHLORIDE 60 MG/1
CAPSULE, DELAYED RELEASE ORAL
Qty: 30 CAPSULE | Refills: 14 | Status: SHIPPED | OUTPATIENT
Start: 2021-02-22

## 2021-02-22 NOTE — TELEPHONE ENCOUNTER
Requested Prescriptions     Pending Prescriptions Disp Refills    DULoxetine (CYMBALTA) 60 MG extended release capsule [Pharmacy Med Name: DULOXETINE HCL DR 60 MG CAP] 30 capsule 14     Sig: TAKE 1 CAPSULE BY MOUTH EVERY DAY       Last Office Visit: 1/7/2021  Next Office Visit: 4/7/2021  Last Medication Refill: 8/18/2020 5 refills

## 2021-03-10 NOTE — TELEPHONE ENCOUNTER
Requested Prescriptions     Pending Prescriptions Disp Refills    clopidogrel (PLAVIX) 75 MG tablet [Pharmacy Med Name: CLOPIDOGREL 75 MG TABLET] 30 tablet 2     Sig: TAKE 1 TABLET BY MOUTH EVERY DAY       Last Office Visit: 1/7/2021  Next Office Visit: 4/7/2021  Last Medication Refill: 4/9/20 with 11 refills

## 2021-03-11 RX ORDER — CLOPIDOGREL BISULFATE 75 MG/1
TABLET ORAL
Qty: 30 TABLET | Refills: 2 | Status: SHIPPED | OUTPATIENT
Start: 2021-03-11

## 2021-03-18 ENCOUNTER — OFFICE VISIT (OUTPATIENT)
Dept: CARDIOLOGY CLINIC | Age: 76
End: 2021-03-18
Payer: MEDICARE

## 2021-03-18 VITALS
BODY MASS INDEX: 20.59 KG/M2 | DIASTOLIC BLOOD PRESSURE: 82 MMHG | HEIGHT: 72 IN | HEART RATE: 64 BPM | SYSTOLIC BLOOD PRESSURE: 118 MMHG | WEIGHT: 152 LBS

## 2021-03-18 DIAGNOSIS — Z86.73 HISTORY OF STROKE: Primary | ICD-10-CM

## 2021-03-18 DIAGNOSIS — Q21.12 PATENT FORAMEN OVALE: ICD-10-CM

## 2021-03-18 PROCEDURE — G8427 DOCREV CUR MEDS BY ELIG CLIN: HCPCS | Performed by: INTERNAL MEDICINE

## 2021-03-18 PROCEDURE — 1036F TOBACCO NON-USER: CPT | Performed by: INTERNAL MEDICINE

## 2021-03-18 PROCEDURE — 1123F ACP DISCUSS/DSCN MKR DOCD: CPT | Performed by: INTERNAL MEDICINE

## 2021-03-18 PROCEDURE — G8484 FLU IMMUNIZE NO ADMIN: HCPCS | Performed by: INTERNAL MEDICINE

## 2021-03-18 PROCEDURE — 4040F PNEUMOC VAC/ADMIN/RCVD: CPT | Performed by: INTERNAL MEDICINE

## 2021-03-18 PROCEDURE — G8420 CALC BMI NORM PARAMETERS: HCPCS | Performed by: INTERNAL MEDICINE

## 2021-03-18 PROCEDURE — 99214 OFFICE O/P EST MOD 30 MIN: CPT | Performed by: INTERNAL MEDICINE

## 2021-03-18 ASSESSMENT — ENCOUNTER SYMPTOMS
GASTROINTESTINAL NEGATIVE: 1
SHORTNESS OF BREATH: 0
RESPIRATORY NEGATIVE: 1
VOMITING: 0
EYES NEGATIVE: 1
DIARRHEA: 0
NAUSEA: 0

## 2021-03-18 NOTE — PROGRESS NOTES
[facility] Progress Note                            Date:  3/18/2021  Patient: Cristel Foley  Age:  67 y. o., 1945      Reason for evaluation:         SUBJECTIVE:    Returns today follow-up assessment overall doing well denies anginal chest pain or limiting dyspnea. He is having a lot of difficulty falling frequently from his Parkinson's. Denies palpitations. Blood pressure 118/82 heart 64. No other issues reported. Patient and wife decided they did not wish to pursue having the patent foramen ovale closed and canceled his appointment with structural heart. Review of Systems   Constitutional: Negative. Negative for chills, fever and unexpected weight change. HENT: Negative. Eyes: Negative. Respiratory: Negative. Negative for shortness of breath. Cardiovascular: Negative. Negative for chest pain. Gastrointestinal: Negative. Negative for diarrhea, nausea and vomiting. Endocrine: Negative. Genitourinary: Negative. Musculoskeletal: Negative. Skin: Negative. Neurological: Negative. All other systems reviewed and are negative. OBJECTIVE:     /82   Pulse 64   Ht 6' (1.829 m)   Wt 152 lb (68.9 kg)   BMI 20.61 kg/m²     Labs:   CBC: No results for input(s): WBC, HGB, HCT, PLT in the last 72 hours. BMP:No results for input(s): NA, K, CO2, BUN, CREATININE, LABGLOM, GLUCOSE in the last 72 hours. BNP: No results for input(s): BNP in the last 72 hours. PT/INR: No results for input(s): PROTIME, INR in the last 72 hours. APTT:No results for input(s): APTT in the last 72 hours. CARDIAC ENZYMES:No results for input(s): CKTOTAL, CKMB, CKMBINDEX, TROPONINI in the last 72 hours. FASTING LIPID PANEL:  Lab Results   Component Value Date    HDL 44 02/17/2020    LDLCALC 186 02/17/2020    TRIG 151 02/17/2020     LIVER PROFILE:No results for input(s): AST, ALT, LABALBU in the last 72 hours.         Past Medical History:   Diagnosis Date    [facility]     Cancer ([facility]     prostate    Neuropathy     Parkinson disease ([facility]     BARBARA (stress urinary incontinence), male 10/10/2016    Vitamin B12 deficiency      Past Surgical History:   Procedure Laterality Date    BACK SURGERY      MOHS SURGERY      NECK SURGERY      PROSTATECTOMY       No family history on file. Allergies   Allergen Reactions    Finasteride     Other Other (See Comments)     ANTIHISTAMINES - TYPE UNKNOWN  ANTIHISTAMINES - TYPE UNKNOWN  ANTIHISTAMINES - TYPE UNKNOWN    Scopolamine Other (See Comments)     Scopolamine patch-- caused hallucination and confusion    Sulfa Antibiotics      Current Outpatient Medications   Medication Sig Dispense Refill    clopidogrel (PLAVIX) 75 MG tablet TAKE 1 TABLET BY MOUTH EVERY DAY 30 tablet 2    DULoxetine (CYMBALTA) 60 MG extended release capsule TAKE 1 CAPSULE BY MOUTH EVERY DAY 30 capsule 14    carvedilol (COREG) 6.25 MG tablet TAKE 1 TABLET BY MOUTH TWICE A DAY WITH MEALS 60 tablet 5    lisinopril (PRINIVIL;ZESTRIL) 40 MG tablet TAKE 1 TABLET BY MOUTH EVERY DAY 30 tablet 5    carbidopa-levodopa (SINEMET CR)  MG per extended release tablet TAKE 1 TABLET BY MOUTH FOUR TIMES A DAY (Patient taking differently: Take 0.5 tablets by mouth TAKE 1 TABLET BY MOUTH FOUR TIMES A DAY) 120 tablet 5    DULoxetine (CYMBALTA) 30 MG extended release capsule TAKE 1 CAPSULE BY MOUTH EVERY DAY 30 capsule 5    cyanocobalamin ([employer] VITAMIN B12) 1000 MCG tablet Take 1,000 mcg by mouth daily       No current facility-administered medications for this visit.       Social History     Socioeconomic History    Marital status:      Spouse name: Not on file    Number of children: Not on file    Years of education: Not on file    Highest education level: Not on file   Occupational History    Not on file   Social Needs    Financial resource strain: Not on file    Food insecurity     Worry: Not on file     Inability: Not on file   Christine Allen Transportation needs     Medical: Not on file     Non-medical: Not on file   Tobacco Use    Smoking status: Never Smoker    Smokeless tobacco: Never Used   Substance and Sexual Activity    Alcohol use: No    Drug use: No    Sexual activity: Not on file   Lifestyle    Physical activity     Days per week: Not on file     Minutes per session: Not on file    Stress: Not on file   Relationships    Social connections     Talks on phone: Not on file     Gets together: Not on file     Attends Mormon service: Not on file     Active member of club or organization: Not on file     Attends meetings of clubs or organizations: Not on file     Relationship status: Not on file    Intimate partner violence     Fear of current or ex partner: Not on file     Emotionally abused: Not on file     Physically abused: Not on file     Forced sexual activity: Not on file   Other Topics Concern    Not on file   Social History Narrative     50+ years    He has 2 daughters    Never in the [employer]    He was a [employer] [address] [address]    Presently not driving    No special hobbies or interest    Physically sedentary    No history of alcohol or tobacco consumption or substance usage       Physical Examination:  /82   Pulse 64   Ht 6' (1.829 m)   Wt 152 lb (68.9 kg)   BMI 20.61 kg/m²   Physical Exam  Vitals signs reviewed. Constitutional:       Appearance: He is well-developed. Neck:      Vascular: No carotid bruit or JVD. Cardiovascular:      Rate and Rhythm: Normal rate and regular rhythm. Heart sounds: Normal heart sounds. No murmur. No friction rub. No gallop. Pulmonary:      Effort: Pulmonary effort is normal. No respiratory distress. Breath sounds: Normal breath sounds. No wheezing or rales. Abdominal:      General: There is no distension. Tenderness: There is no abdominal tenderness. Lymphadenopathy:      Cervical: No cervical adenopathy.    Skin:     General: Skin is warm and dry. ASSESSMENT:     Diagnosis Orders   1. History of stroke     2. Patent foramen ovale         PLAN:  No orders of the defined types were placed in this encounter. No orders of the defined types were placed in this encounter. 1. Continue present medications  2. Recommend follow-up assessment in 6 months    Return in about 6 months (around 9/18/2021) for return to Dr. Blanche Mckeon only. Adán Bennett MD 3/18/2021 9:48 AM CDT    [facility]      Thisdictation was generated by voice recognition computer software. Although all attempts are made to edit the dictation for accuracy, there may be errors in the transcription that are not intended.

## 2021-03-24 DIAGNOSIS — G20 PARKINSON DISEASE (HCC): ICD-10-CM

## 2021-03-24 RX ORDER — CARBIDOPA AND LEVODOPA 50; 200 MG/1; MG/1
TABLET, EXTENDED RELEASE ORAL
Qty: 120 TABLET | Refills: 18 | Status: SHIPPED | OUTPATIENT
Start: 2021-03-24

## 2021-03-24 NOTE — TELEPHONE ENCOUNTER
Requested Prescriptions     Pending Prescriptions Disp Refills    carbidopa-levodopa (SINEMET CR)  MG per extended release tablet [Pharmacy Med Name: CARBIDOPA-LEVO ER  TAB] 120 tablet 18     Sig: TAKE 1 TABLET BY MOUTH FOUR TIMES A DAY       Last Office Visit: 1/7/2021  Next Office Visit: 4/7/2021  Last Medication Refill: 10/23/2020 5 refills

## 2021-04-07 ENCOUNTER — OFFICE VISIT (OUTPATIENT)
Dept: NEUROLOGY | Age: 76
End: 2021-04-07
Payer: MEDICARE

## 2021-04-07 VITALS
BODY MASS INDEX: 20.32 KG/M2 | DIASTOLIC BLOOD PRESSURE: 88 MMHG | WEIGHT: 150 LBS | HEART RATE: 70 BPM | SYSTOLIC BLOOD PRESSURE: 132 MMHG | HEIGHT: 72 IN | RESPIRATION RATE: 14 BRPM

## 2021-04-07 DIAGNOSIS — Z86.011 HISTORY OF MENINGIOMA OF THE BRAIN: ICD-10-CM

## 2021-04-07 DIAGNOSIS — R44.1 VISUAL HALLUCINATIONS: ICD-10-CM

## 2021-04-07 DIAGNOSIS — G20 PARKINSON DISEASE (HCC): Primary | ICD-10-CM

## 2021-04-07 DIAGNOSIS — R41.3 MEMORY LOSS: ICD-10-CM

## 2021-04-07 DIAGNOSIS — G62.9 NEUROPATHY: ICD-10-CM

## 2021-04-07 PROCEDURE — 1036F TOBACCO NON-USER: CPT | Performed by: PSYCHIATRY & NEUROLOGY

## 2021-04-07 PROCEDURE — 1123F ACP DISCUSS/DSCN MKR DOCD: CPT | Performed by: PSYCHIATRY & NEUROLOGY

## 2021-04-07 PROCEDURE — 4040F PNEUMOC VAC/ADMIN/RCVD: CPT | Performed by: PSYCHIATRY & NEUROLOGY

## 2021-04-07 PROCEDURE — G8420 CALC BMI NORM PARAMETERS: HCPCS | Performed by: PSYCHIATRY & NEUROLOGY

## 2021-04-07 PROCEDURE — G8427 DOCREV CUR MEDS BY ELIG CLIN: HCPCS | Performed by: PSYCHIATRY & NEUROLOGY

## 2021-04-07 PROCEDURE — 99214 OFFICE O/P EST MOD 30 MIN: CPT | Performed by: PSYCHIATRY & NEUROLOGY

## 2021-04-08 NOTE — TELEPHONE ENCOUNTER
Requested Prescriptions     Pending Prescriptions Disp Refills    carbidopa-levodopa (SINEMET CR)  MG per extended release tablet [Pharmacy Med Name: CARBIDOPA-LEVO ER  TAB] 90 tablet 2     Sig: TAKE 1 TABLET BY MOUTH THREE TIMES A DAY       Last Office Visit: 8/3/2020  Next Office Visit: 10/5/2020  Last Medication Refill:5/14/20  Clare salazar Detail Level: Detailed

## 2021-05-24 ENCOUNTER — TELEPHONE (OUTPATIENT)
Dept: NEUROSURGERY | Facility: CLINIC | Age: 76
End: 2021-05-24

## 2021-05-24 NOTE — TELEPHONE ENCOUNTER
Anny called back and left a VM.  I called her back and she confirmed his appt for tomorrow after his MRI.    angelika david CMA

## 2021-05-24 NOTE — TELEPHONE ENCOUNTER
Called patient's home # to confirm his appt with Dr Raphael tomorrow but there was no answer so I called his mobile # and got a voicemail stating it was Anny.  I left a voicemail and asked that someone call me back regarding Mr Torres's appt.    angelika david CMA

## 2021-05-25 ENCOUNTER — HOSPITAL ENCOUNTER (OUTPATIENT)
Dept: MRI IMAGING | Facility: HOSPITAL | Age: 76
Discharge: HOME | End: 2021-05-25
Admitting: NEUROLOGICAL SURGERY

## 2021-05-25 ENCOUNTER — OFFICE VISIT (OUTPATIENT)
Dept: NEUROSURGERY | Facility: CLINIC | Age: 76
End: 2021-05-25

## 2021-05-25 VITALS — WEIGHT: 150 LBS | BODY MASS INDEX: 20.32 KG/M2 | HEIGHT: 72 IN

## 2021-05-25 DIAGNOSIS — Z78.9 NON-SMOKER: ICD-10-CM

## 2021-05-25 DIAGNOSIS — R41.3 MEMORY DIFFICULTIES: ICD-10-CM

## 2021-05-25 DIAGNOSIS — D32.9 BENIGN MENINGIOMA (HCC): Primary | ICD-10-CM

## 2021-05-25 DIAGNOSIS — D32.9 MENINGIOMA (HCC): ICD-10-CM

## 2021-05-25 LAB — CREAT BLDA-MCNC: 1 MG/DL (ref 0.6–1.3)

## 2021-05-25 PROCEDURE — 0 GADOBENATE DIMEGLUMINE 529 MG/ML SOLUTION: Performed by: NEUROLOGICAL SURGERY

## 2021-05-25 PROCEDURE — 99213 OFFICE O/P EST LOW 20 MIN: CPT | Performed by: NEUROLOGICAL SURGERY

## 2021-05-25 PROCEDURE — A9577 INJ MULTIHANCE: HCPCS | Performed by: NEUROLOGICAL SURGERY

## 2021-05-25 PROCEDURE — 82565 ASSAY OF CREATININE: CPT

## 2021-05-25 PROCEDURE — 70553 MRI BRAIN STEM W/O & W/DYE: CPT

## 2021-05-25 RX ADMIN — GADOBENATE DIMEGLUMINE 20 ML: 529 INJECTION, SOLUTION INTRAVENOUS at 10:59

## 2021-05-25 NOTE — PROGRESS NOTES
SUBJECTIVE:  Patient ID: Asher Torres is a 75 y.o. male is here today for follow-up.    Chief Complaint: Meningioma  Chief Complaint   Patient presents with   • MENINGIOMA     patient is here for an 8 month follow up w/MRI today @ [facility]; patient has no complaints today.       HPI  75-year-old gentleman that we are following for a right frontal convexity meningioma.  He underwent radiation treatments in November 2020 after the meningioma had increased in size.    The following portions of the patient's history were reviewed and updated as appropriate: allergies, current medications, past family history, past medical history, past social history, past surgical history and problem list.    OBJECTIVE:    Review of Systems   All other systems reviewed and are negative.         Physical Exam  Eyes:      Extraocular Movements: EOM normal.      Pupils: Pupils are equal, round, and reactive to light.   Neurological:      Mental Status: He is oriented to person, place, and time.      Coordination: Finger-Nose-Finger Test normal.      Gait: Gait is intact.      Deep Tendon Reflexes: Strength normal.   Psychiatric:         Speech: Speech normal.         Neurologic Exam     Mental Status   Oriented to person, place, and time.   Attention: normal.   Speech: speech is normal   Level of consciousness: alert  Knowledge: good.   Severe bradycardia, masked face     Cranial Nerves     CN II   Visual fields full to confrontation.     CN III, IV, VI   Pupils are equal, round, and reactive to light.  Extraocular motions are normal.     CN V   Facial sensation intact.     CN VII   Facial expression full, symmetric.     CN VIII   CN VIII normal.     CN IX, X   CN IX normal.   CN X normal.     CN XI   CN XI normal.     CN XII   CN XII normal.     Motor Exam   Muscle bulk: normal  Overall muscle tone: normal  Right arm pronator drift: absent  Left arm pronator drift: absent    Strength   Strength 5/5 throughout. Severe bradykinetic movements      Sensory Exam   Light touch normal.   Pinprick normal.     Gait, Coordination, and Reflexes     Gait  Gait: normal    Coordination   Finger to nose coordination: normal    Tremor   Resting tremor: absent  Intention tremor: absent  Action tremor: absent    Reflexes   Reflexes 2+ except as noted.       Independent Review of Radiographic Studies:                                     5/25/2021 9/24/2020      ASSESSMENT/PLAN:  MRI of the brain with and without contrast today compared to September shows decrease in size of the treated meningioma.  We will reimage him in about 6 months.      1. Benign meningioma (CMS/HCC)    2. Memory difficulties    3. Non-smoker    4. BMI 20.0-20.9, adult        The patient's Body mass index is 20.34 kg/m².. BMI is within normal parameters. No follow-up required.    No follow-ups on file.      Jimmy Raphael MD

## 2021-07-08 RX ORDER — CARVEDILOL 6.25 MG/1
TABLET ORAL
Qty: 60 TABLET | Refills: 5 | Status: SHIPPED | OUTPATIENT
Start: 2021-07-08

## 2021-07-08 NOTE — TELEPHONE ENCOUNTER
Requested Prescriptions     Pending Prescriptions Disp Refills    carvedilol (COREG) 6.25 MG tablet [Pharmacy Med Name: CARVEDILOL 6.25 MG TABLET] 60 tablet 5     Sig: TAKE 1 TABLET BY MOUTH TWICE A DAY WITH MEALS       Last Office Visit: 4/7/2021  Next Office Visit: Visit date not found  Last Medication Refill: 1/11/2021 5 refills

## 2022-01-25 ENCOUNTER — APPOINTMENT (OUTPATIENT)
Dept: MRI IMAGING | Facility: HOSPITAL | Age: 77
End: 2022-01-25

## 2022-02-26 ENCOUNTER — APPOINTMENT (OUTPATIENT)
Dept: GENERAL RADIOLOGY | Age: 77
End: 2022-02-26
Payer: MEDICARE

## 2022-02-26 ENCOUNTER — HOSPITAL ENCOUNTER (EMERGENCY)
Age: 77
Discharge: NURSING, REHAB OR LONG-TERM CARE | End: 2022-02-27
Attending: EMERGENCY MEDICINE
Payer: MEDICARE

## 2022-02-26 DIAGNOSIS — U07.1 COVID-19 VIRUS INFECTION: Primary | ICD-10-CM

## 2022-02-26 LAB
ALBUMIN SERPL-MCNC: 3.7 G/DL (ref 3.5–5.2)
ALP BLD-CCNC: 62 U/L (ref 40–130)
ALT SERPL-CCNC: 13 U/L (ref 5–41)
ANION GAP SERPL CALCULATED.3IONS-SCNC: 11 MMOL/L (ref 7–19)
AST SERPL-CCNC: 15 U/L (ref 5–40)
BASOPHILS ABSOLUTE: 0 K/UL (ref 0–0.2)
BASOPHILS RELATIVE PERCENT: 0.3 % (ref 0–1)
BILIRUB SERPL-MCNC: 0.5 MG/DL (ref 0.2–1.2)
BUN BLDV-MCNC: 22 MG/DL (ref 8–23)
CALCIUM SERPL-MCNC: 8.7 MG/DL (ref 8.8–10.2)
CHLORIDE BLD-SCNC: 102 MMOL/L (ref 98–111)
CO2: 21 MMOL/L (ref 22–29)
CREAT SERPL-MCNC: 0.8 MG/DL (ref 0.5–1.2)
EOSINOPHILS ABSOLUTE: 0 K/UL (ref 0–0.6)
EOSINOPHILS RELATIVE PERCENT: 0 % (ref 0–5)
GFR AFRICAN AMERICAN: >59
GFR NON-AFRICAN AMERICAN: >60
GLUCOSE BLD-MCNC: 100 MG/DL (ref 74–109)
HCT VFR BLD CALC: 41.3 % (ref 42–52)
HEMOGLOBIN: 13.1 G/DL (ref 14–18)
IMMATURE GRANULOCYTES #: 0 K/UL
LYMPHOCYTES ABSOLUTE: 0.9 K/UL (ref 1.1–4.5)
LYMPHOCYTES RELATIVE PERCENT: 9.9 % (ref 20–40)
MCH RBC QN AUTO: 27.7 PG (ref 27–31)
MCHC RBC AUTO-ENTMCNC: 31.7 G/DL (ref 33–37)
MCV RBC AUTO: 87.3 FL (ref 80–94)
MONOCYTES ABSOLUTE: 0.9 K/UL (ref 0–0.9)
MONOCYTES RELATIVE PERCENT: 10.2 % (ref 0–10)
NEUTROPHILS ABSOLUTE: 7.1 K/UL (ref 1.5–7.5)
NEUTROPHILS RELATIVE PERCENT: 79.5 % (ref 50–65)
PDW BLD-RTO: 15 % (ref 11.5–14.5)
PLATELET # BLD: 175 K/UL (ref 130–400)
PMV BLD AUTO: 10.5 FL (ref 9.4–12.4)
POTASSIUM SERPL-SCNC: 3.7 MMOL/L (ref 3.5–5)
RBC # BLD: 4.73 M/UL (ref 4.7–6.1)
SODIUM BLD-SCNC: 134 MMOL/L (ref 136–145)
TOTAL PROTEIN: 6.2 G/DL (ref 6.6–8.7)
TROPONIN: <0.01 NG/ML (ref 0–0.03)
WBC # BLD: 9 K/UL (ref 4.8–10.8)

## 2022-02-26 PROCEDURE — 99284 EMERGENCY DEPT VISIT MOD MDM: CPT

## 2022-02-26 PROCEDURE — 71045 X-RAY EXAM CHEST 1 VIEW: CPT

## 2022-02-26 PROCEDURE — 93005 ELECTROCARDIOGRAM TRACING: CPT | Performed by: EMERGENCY MEDICINE

## 2022-02-26 PROCEDURE — 85025 COMPLETE CBC W/AUTO DIFF WBC: CPT

## 2022-02-26 PROCEDURE — 80053 COMPREHEN METABOLIC PANEL: CPT

## 2022-02-26 PROCEDURE — 84484 ASSAY OF TROPONIN QUANT: CPT

## 2022-02-26 PROCEDURE — 36415 COLL VENOUS BLD VENIPUNCTURE: CPT

## 2022-02-27 VITALS
OXYGEN SATURATION: 95 % | TEMPERATURE: 97.6 F | SYSTOLIC BLOOD PRESSURE: 149 MMHG | RESPIRATION RATE: 16 BRPM | DIASTOLIC BLOOD PRESSURE: 103 MMHG | HEART RATE: 59 BPM

## 2022-02-27 NOTE — ED NOTES
Spoke with nurse Brenda Johnson from the facility the pt stays, she gave report and said if we had any questions to contact her and she would be there all night     VIPUL Redding  02/26/22 2057

## 2022-02-27 NOTE — ED NOTES
Report called to William Lee RN at [facility].      Angelina Hastings RN  02/27/22 3013

## 2022-02-27 NOTE — ED PROVIDER NOTES
[facility]  eMERGENCY dEPARTMENT eNCOUnter      Pt Name: David Rendon  MRN: [id number]  [place] 1945  Date of evaluation: 2/26/2022  Provider: Eli Cabral MD    CHIEF COMPLAINT       Chief Complaint   Patient presents with    Positive For Covid-19     per rapid at nursing home    Shortness of Breath         HISTORY OF PRESENT ILLNESS   (Location/Symptom, Timing/Onset,Context/Setting, Quality, Duration, Modifying Factors, Severity)  Note limiting factors. David Rendon is a 67 y.o. male who presents to the emergency department for evaluation regarding cough and shortness of breath. Patient arrived here to the ED via EMS. He tells me that he tested positive for COVID-19 infection earlier today. States he has had some mild symptoms for a couple of days. He is not having any marked shortness of breath. Denies vomiting or diarrhea episodes. He is previously vaccinated for COVID-19. HPI    NursingNotes were reviewed. REVIEW OF SYSTEMS    (2-9 systems for level 4, 10 or more for level 5)     Review of Systems   Constitutional: Negative for chills and fever. HENT: Positive for congestion. Respiratory: Positive for cough and shortness of breath. Cardiovascular: Negative for chest pain and palpitations. Gastrointestinal: Negative for abdominal pain, diarrhea, nausea and vomiting. Neurological: Negative for syncope. All other systems reviewed and are negative.            PAST MEDICALHISTORY     Past Medical History:   Diagnosis Date    Acute ischemic stroke ([facility] [address] [facility]     Cancer ([facility]     prostate    Neuropathy     Parkinson disease ([facility] [facility]     BARBARA (stress urinary incontinence), male 10/10/2016    Vitamin B12 deficiency          SURGICAL HISTORY       Past Surgical History:   Procedure Laterality Date    BACK SURGERY      MOHS SURGERY      NECK SURGERY      PROSTATECTOMY           CURRENT MEDICATIONS     Discharge Medication List as of 2/27/2022 12:37 AM      CONTINUE these medications which have NOT CHANGED    Details   carvedilol (COREG) 6.25 MG tablet TAKE 1 TABLET BY MOUTH TWICE A DAY WITH MEALS, Disp-60 tablet, R-5SEND REFILLSNormal      carbidopa-levodopa (SINEMET CR)  MG per extended release tablet TAKE 1 TABLET BY MOUTH FOUR TIMES A DAY, Disp-120 tablet, R-18Normal      clopidogrel (PLAVIX) 75 MG tablet TAKE 1 TABLET BY MOUTH EVERY DAY, Disp-30 tablet, R-2Normal      !! DULoxetine (CYMBALTA) 60 MG extended release capsule TAKE 1 CAPSULE BY MOUTH EVERY DAY, Disp-30 capsule, R-14Normal      lisinopril (PRINIVIL;ZESTRIL) 40 MG tablet TAKE 1 TABLET BY MOUTH EVERY DAY, Disp-30 tablet, R-5Normal      !! DULoxetine (CYMBALTA) 30 MG extended release capsule TAKE 1 CAPSULE BY MOUTH EVERY DAY, Disp-30 capsule,R-5Normal      cyanocobalamin ([facility]S VITAMIN B12) 1000 MCG tablet Take 1,000 mcg by mouth dailyHistorical Med       !! - Potential duplicate medications found. Please discuss with provider. ALLERGIES     Finasteride, Other, Scopolamine, and Sulfa antibiotics    FAMILY HISTORY     No family history on file.        SOCIAL HISTORY       Social History     Socioeconomic History    Marital status:      Spouse name: Not on file    Number of children: Not on file    Years of education: Not on file    Highest education level: Not on file   Occupational History    Not on file   Tobacco Use    Smoking status: Never Smoker    Smokeless tobacco: Never Used   Vaping Use    Vaping Use: Never used   Substance and Sexual Activity    Alcohol use: No    Drug use: No    Sexual activity: Not on file   Other Topics Concern    Not on file   Social History Narrative     50+ years    He has 2 daughters    Never in the [employer]    He was a emotion.meman    [employer]    Presently not driving    No special hobbies or interest    Physically sedentary    No history of alcohol or tobacco consumption or substance usage     Social Determinants of Health Financial Resource Strain:     Difficulty of Paying Living Expenses: Not on file   Food Insecurity:     Worried About Running Out of Food in the Last Year: Not on file    Dioni of Food in the Last Year: Not on file   Transportation Needs:     Lack of Transportation (Medical): Not on file    Lack of Transportation (Non-Medical): Not on file   Physical Activity:     Days of Exercise per Week: Not on file    Minutes of Exercise per Session: Not on file   Stress:     Feeling of Stress : Not on file   Social Connections:     Frequency of Communication with Friends and Family: Not on file    Frequency of Social Gatherings with Friends and Family: Not on file    Attends Mosque Services: Not on file    Active Member of [address] [place] [address] [employer] or Organizations: Not on file    Attends Club or Organization Meetings: Not on file    Marital Status: Not on file   Intimate Partner Violence:     Fear of Current or Ex-Partner: Not on file    Emotionally Abused: Not on file    Physically Abused: Not on file    Sexually Abused: Not on file   Housing Stability:     Unable to Pay for Housing in the Last Year: Not on file    Number of [place] in the Last Year: Not on file    Unstable Housing in the Last Year: Not on file       SCREENINGS    [place] Coma Scale  Eye Opening: Spontaneous  Best Verbal Response: Oriented  Best Motor Response: Obeys commands  Thu Coma Scale Score: 15        PHYSICAL EXAM    (up to 7 for level 4, 8 or more for level 5)     ED Triage Vitals [02/26/22 2008]   BP Temp Temp Source Pulse Resp SpO2 Height Weight   (!) 105/93 98.6 °F (37 °C) Oral 77 24 95 % -- --       Physical Exam  Vitals and nursing note reviewed. HENT:      Head: Atraumatic. Mouth/Throat:      Mouth: Mucous membranes are not dry. Pharynx: No posterior oropharyngeal erythema. Eyes:      General: No scleral icterus. Pupils: Pupils are equal, round, and reactive to light.    Neck:      Trachea: No tracheal deviation. Cardiovascular:      Rate and Rhythm: Normal rate and regular rhythm. Heart sounds: Normal heart sounds. No murmur heard. Pulmonary:      Effort: Pulmonary effort is normal. No respiratory distress. Breath sounds: Normal breath sounds. No stridor. Abdominal:      General: There is no distension. Palpations: Abdomen is soft. Tenderness: There is no abdominal tenderness. There is no guarding. Musculoskeletal:      Right lower leg: No edema. Left lower leg: No edema. Skin:     Capillary Refill: Capillary refill takes less than 2 seconds. Coloration: Skin is not pale. Findings: No rash. Neurological:      Mental Status: He is alert and oriented to person, place, and time. Psychiatric:         Behavior: Behavior is cooperative. DIAGNOSTIC RESULTS     EKG: All EKG's areinterpreted by the Emergency Department Physician who either signs or Co-signs this chart in the absence of a cardiologist.    2013: Normal sinus rhythm at a rate of 78, no evidence of acute ST elevation is identified. QTc: 421 MS. RADIOLOGY:  Non-plain film images such as CT, Ultrasound and MRI are read by the radiologist. Plain radiographic images are visualized and preliminarily interpreted bythe emergency physician with the below findings:      XR CHEST PORTABLE   Final Result   1.. Bibasilar atelectasis. Lungs are otherwise clear. The lung apices,   particularly the right lung apex is obscured.    Signed by Dr Antwan Pierson:  Dru Crumbly - Abnormal; Notable for the following components:       Result Value    Hemoglobin 13.1 (*)     Hematocrit 41.3 (*)     MCHC 31.7 (*)     RDW 15.0 (*)     Neutrophils % 79.5 (*)     Lymphocytes % 9.9 (*)     Monocytes % 10.2 (*)     Lymphocytes Absolute 0.9 (*)     All other components within normal limits   COMPREHENSIVE METABOLIC PANEL - Abnormal; Notable for the following components:    Sodium 134 (*)     CO2 21 (*)     Calcium 8.7 (*)     Total Protein 6.2 (*)     All other components within normal limits   TROPONIN       All other labs were within normal range or not returned as of this dictation. EMERGENCY DEPARTMENT COURSE and DIFFERENTIAL DIAGNOSIS/MDM:   Vitals:    Vitals:    02/26/22 2008 02/26/22 2011 02/27/22 0100   BP: (!) 105/93  (!) 149/103   Pulse: 77 59    Resp: 24  16   Temp: 98.6 °F (37 °C)  97.6 °F (36.4 °C)   TempSrc: Oral  Oral   SpO2: 95%         MDM    Reassessment    Patient is positive for COVID-19 infection. He is not hypoxic with a room air oxygen saturation of 97 to 95%. Chest radiograph does not reveal significant overt pneumonia. I do not see a clear indication for acute inpatient hospitalization.   PROCEDURES:  Unless otherwise noted below, none     Procedures    FINAL IMPRESSION      1. COVID-19 virus infection          DISPOSITION/PLAN   DISPOSITION Decision To Discharge 02/27/2022 12:37:28 AM      PATIENT REFERRED TO:  Ian Louie  [facility] [id number]  [phone]            DISCHARGE MEDICATIONS:  Discharge Medication List as of 2/27/2022 12:37 AM             (Please note that portions of this note were completed with a voice recognition program.  Efforts were made to edit thedictations but occasionally words are mis-transcribed.)    Andrew Palmer MD (electronically signed)  Attending Emergency Physician         Andrew Palmer MD  03/14/22 4780

## 2022-02-28 LAB
EKG P AXIS: 46 DEGREES
EKG P-R INTERVAL: 202 MS
EKG Q-T INTERVAL: 390 MS
EKG QRS DURATION: 92 MS
EKG QTC CALCULATION (BAZETT): 421 MS
EKG T AXIS: 47 DEGREES

## 2022-02-28 PROCEDURE — 93010 ELECTROCARDIOGRAM REPORT: CPT | Performed by: INTERNAL MEDICINE

## 2022-03-14 ASSESSMENT — ENCOUNTER SYMPTOMS
DIARRHEA: 0
SHORTNESS OF BREATH: 1
COUGH: 1
ABDOMINAL PAIN: 0
NAUSEA: 0
VOMITING: 0

## 2022-05-05 ENCOUNTER — APPOINTMENT (OUTPATIENT)
Dept: CT IMAGING | Age: 77
End: 2022-05-05
Payer: MEDICARE

## 2022-05-05 ENCOUNTER — HOSPITAL ENCOUNTER (EMERGENCY)
Age: 77
Discharge: HOME | End: 2022-05-05
Attending: EMERGENCY MEDICINE
Payer: MEDICARE

## 2022-05-05 VITALS
HEART RATE: 68 BPM | RESPIRATION RATE: 16 BRPM | SYSTOLIC BLOOD PRESSURE: 118 MMHG | BODY MASS INDEX: 23.7 KG/M2 | TEMPERATURE: 97.7 F | WEIGHT: 175 LBS | DIASTOLIC BLOOD PRESSURE: 87 MMHG | HEIGHT: 72 IN | OXYGEN SATURATION: 94 %

## 2022-05-05 DIAGNOSIS — G20 PARKINSONISM, UNSPECIFIED PARKINSONISM TYPE (HCC): ICD-10-CM

## 2022-05-05 DIAGNOSIS — M40.203 KYPHOSIS OF CERVICOTHORACIC REGION, UNSPECIFIED KYPHOSIS TYPE: ICD-10-CM
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
## 2022-05-05 PROCEDURE — 72125 CT NECK SPINE W/O DYE: CPT

## 2022-05-05 PROCEDURE — 71250 CT THORAX DX C-: CPT

## 2022-05-05 PROCEDURE — 99284 EMERGENCY DEPT VISIT MOD MDM: CPT

## 2022-05-05 PROCEDURE — 70450 CT HEAD/BRAIN W/O DYE: CPT

## 2022-05-05 ASSESSMENT — ENCOUNTER SYMPTOMS
EYES NEGATIVE: 1
RESPIRATORY NEGATIVE: 1
GASTROINTESTINAL NEGATIVE: 1

## 2022-05-05 ASSESSMENT — PAIN - FUNCTIONAL ASSESSMENT
PAIN_FUNCTIONAL_ASSESSMENT: NONE - DENIES PAIN
PAIN_FUNCTIONAL_ASSESSMENT: NONE - DENIES PAIN

## 2022-05-05 NOTE — ED PROVIDER NOTES
[facility] EMERGENCY DEPT  EMERGENCY DEPARTMENT ENCOUNTER      Pt Name: Tristan Chen  MRN: [id number]  [place] 1945  Date of evaluation: 5/5/2022  Provider: Adalid Thomson MD    CHIEF COMPLAINT       Chief Complaint   Patient presents with    Fall     Fall approx 1.5 hrs PTA. No LOC. HISTORY OF PRESENT ILLNESS   (Location/Symptom, Timing/Onset,Context/Setting, Quality, Duration, Modifying Factors, Severity)  Note limiting factors. Tristan Chen is a 67 y.o. male who presents to the emergency department for evaluation after a fall that happened about an hour and half prior to arrival.  Patient says he thinks he fell onto either the counter with the bed but he cannot remember. Does not think he hit his head but says that he does have some pain in his head since the fall. States most of his pain is in the left upper chest and going into the shoulder but says that his left shoulder has bothered him for a long time. Patient has history of prior stroke. Takes Plavix per medical records. HPI    NursingNotes were reviewed. REVIEW OF SYSTEMS    (2-9 systems for level 4, 10 or more for level 5)     Review of Systems   Unable to perform ROS: Dementia   Constitutional: Negative. HENT: Negative. Eyes: Negative. Respiratory: Negative. Gastrointestinal: Negative. Genitourinary: Negative. Musculoskeletal: Positive for arthralgias. Skin: Negative. Neurological: Negative. Hematological: Negative. Psychiatric/Behavioral: Negative. A complete review of systems was performed and is negative except as noted above in the HPI.        PAST MEDICAL HISTORY     Past Medical History:   Diagnosis Date    [facility]     Cancer ([facility]     prostate    Neuropathy     Parkinson disease ([facility]     Patent foramen ovale     BARBARA (stress urinary incontinence), male 10/10/2016    Vitamin B12 deficiency          SURGICAL HISTORY       Past Surgical History:   Procedure Laterality Date    BACK SURGERY      MOHS SURGERY      NECK SURGERY      PROSTATECTOMY           CURRENT MEDICATIONS       Discharge Medication List as of 5/5/2022  1:38 PM      CONTINUE these medications which have NOT CHANGED    Details   carvedilol (COREG) 6.25 MG tablet TAKE 1 TABLET BY MOUTH TWICE A DAY WITH MEALS, Disp-60 tablet, R-5SEND REFILLSNormal      carbidopa-levodopa (SINEMET CR)  MG per extended release tablet TAKE 1 TABLET BY MOUTH FOUR TIMES A DAY, Disp-120 tablet, R-18Normal      clopidogrel (PLAVIX) 75 MG tablet TAKE 1 TABLET BY MOUTH EVERY DAY, Disp-30 tablet, R-2Normal      !! DULoxetine (CYMBALTA) 60 MG extended release capsule TAKE 1 CAPSULE BY MOUTH EVERY DAY, Disp-30 capsule, R-14Normal      lisinopril (PRINIVIL;ZESTRIL) 40 MG tablet TAKE 1 TABLET BY MOUTH EVERY DAY, Disp-30 tablet, R-5Normal      !! DULoxetine (CYMBALTA) 30 MG extended release capsule TAKE 1 CAPSULE BY MOUTH EVERY DAY, Disp-30 capsule,R-5Normal      cyanocobalamin ([facility] VITAMIN B12) 1000 MCG tablet Take 1,000 mcg by mouth dailyHistorical Med       !! - Potential duplicate medications found. Please discuss with provider. ALLERGIES     Finasteride, Other, Scopolamine, and Sulfa antibiotics    FAMILY HISTORY     No family history on file.        SOCIAL HISTORY       Social History     Socioeconomic History    Marital status:      Spouse name: Not on file    Number of children: Not on file    Years of education: Not on file    Highest education level: Not on file   Occupational History    Not on file   Tobacco Use    Smoking status: Never Smoker    Smokeless tobacco: Never Used   Vaping Use    Vaping Use: Never used   Substance and Sexual Activity    Alcohol use: No    Drug use: No    Sexual activity: Not on file   Other Topics Concern    Not on file   Social History Narrative     50+ years    He has 2 daughters    Never in the [employer]    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    [employer]    Presently not driving    No special hobbies or interest    Physically sedentary    No history of alcohol or tobacco consumption or substance usage     Social Determinants of Health     Financial Resource Strain:     Difficulty of Paying Living Expenses: Not on file   Food Insecurity:     Worried About Running Out of Food in the Last Year: Not on file    Dioni of Food in the Last Year: Not on file   Transportation Needs:     Lack of Transportation (Medical): Not on file    Lack of Transportation (Non-Medical): Not on file   Physical Activity:     Days of Exercise per Week: Not on file    Minutes of Exercise per Session: Not on file   Stress:     Feeling of Stress : Not on file   Social Connections:     Frequency of Communication with Friends and Family: Not on file    Frequency of Social Gatherings with Friends and Family: Not on file    Attends Orthodoxy Services: Not on file    Active Member of [address] [place] [address] [employer] or Organizations: Not on file    Attends Club or Organization Meetings: Not on file    Marital Status: Not on file   Intimate Partner Violence:     Fear of Current or Ex-Partner: Not on file    Emotionally Abused: Not on file    Physically Abused: Not on file    Sexually Abused: Not on file   Housing Stability:     Unable to Pay for Housing in the Last Year: Not on file    Number of [place] in the Last Year: Not on file    Unstable Housing in the Last Year: Not on file       SCREENINGS    [place] Coma Scale  Eye Opening: Spontaneous  Best Verbal Response: Oriented  Best Motor Response: Obeys commands  Thu Coma Scale Score: 15        PHYSICAL EXAM    (up to 7 for level 4, 8 or more for level 5)     ED Triage Vitals [05/05/22 1115]   BP Temp Temp src Pulse Resp SpO2 Height Weight   118/87 97.7 °F (36.5 °C) -- 68 16 94 % 6' (1.829 m) 175 lb (79.4 kg)       Physical Exam  Vitals and nursing note reviewed. Constitutional:       General: He is not in acute distress. Appearance: Normal appearance. He is well-developed. He is not diaphoretic. HENT:      Head: Normocephalic and atraumatic. No Kaiser's sign, contusion, right periorbital erythema, left periorbital erythema or laceration. Right Ear: External ear normal.      Left Ear: External ear normal.      Nose: No nasal deformity, laceration, mucosal edema or rhinorrhea. Mouth/Throat:      Mouth: No oral lesions. Eyes:      Conjunctiva/sclera: Conjunctivae normal.      Pupils: Pupils are equal, round, and reactive to light. Neck:      Trachea: No tracheal deviation. Cardiovascular:      Rate and Rhythm: Normal rate and regular rhythm. Pulses:           Radial pulses are 2+ on the right side and 2+ on the left side. Dorsalis pedis pulses are 2+ on the right side and 2+ on the left side. Pulmonary:      Effort: No accessory muscle usage or respiratory distress. Breath sounds: Normal breath sounds. No decreased breath sounds, rhonchi or rales. Abdominal:      General: There is no distension. Palpations: Abdomen is not rigid. Tenderness: There is no abdominal tenderness. There is no guarding. Musculoskeletal:      Right shoulder: Normal.      Left shoulder: Normal.      Cervical back: No deformity, rigidity, tenderness or bony tenderness. Thoracic back: Normal. No deformity, tenderness or bony tenderness. Lumbar back: Normal. No deformity, tenderness or bony tenderness. Right hip: Normal.      Left hip: Normal.      Right knee: Normal.      Left knee: Normal.      Comments: Slumped over with kyphosis. No deformities or other obvious injuries   Skin:     Findings: No laceration. Neurological:      Mental Status: He is alert and oriented to person, place, and time. GCS: GCS eye subscore is 4. GCS verbal subscore is 5. GCS motor subscore is 6. Cranial Nerves: No cranial nerve deficit. Sensory: No sensory deficit.       Comments: Slow to respond, hypophonic voice. Strabismus present. Answers questions appropriately but with delayed responses   Psychiatric:         Speech: Speech normal.         Behavior: Behavior is slowed. DIAGNOSTIC RESULTS     EKG: All EKG's are interpreted by the Emergency Department Physician who either signs or Co-signs this chart in the absence of a cardiologist.        RADIOLOGY:   Non-plain film images such as CT, Ultrasound and MRI are read by the radiologist. Plainradiographic images are visualized and preliminarily interpreted by the emergency physician with the below findings:        Interpretation per the Radiologist below, if available at the time of this note:    CT CHEST [address]   Final Result   1. No acute intrathoracic abnormality is identified, there is mild to   moderate dependent atelectasis bilaterally. 2. Extensive kyphotic deformity of the cervical and thoracic spine. 3. Aneurysmal dilation of the ascending aorta with a diameter of 4.3   cm.    4. Multiple bilateral rounded exophytic involving the upper kidneys,   probably simple and complex cysts. One of the lesions at the upper   pole of the left kidney measuring 35 mm is potentially solid. Follow-up nonemergent renal ultrasound is suggested. Signed by Dr Constantine Robins. Ana Paula      CT CERVICAL SPINE WO CONTRAST   Final Result   The study is limited by suboptimal positioning, the   patient's head is tilted to the right and there is reversal cervical   curvature as well as levoscoliosis, however no fracture is identified. Advanced degenerative changes are present, particularly C5-6 and C6-7. Signed by Dr Constantine Robins. Ana Paula      CT HEAD WO CONTRAST   Final Result   1. Exam is severely limited due to limitations in proper patient   positioning. 2. No gross intracranial hemorrhage or mass effect. 3. Calvarium is intact.    Signed by Dr Buffy Lee            ED BEDSIDE ULTRASOUND:   Performed by ED Physician - none    LABS:  Labs Reviewed - No data to display    All other labs were within normal range or not returned as of this dictation. EMERGENCY DEPARTMENT COURSE and DIFFERENTIALDIAGNOSIS/MDM:   Vitals:    Vitals:    05/05/22 1115   BP: 118/87   Pulse: 68   Resp: 16   Temp: 97.7 °F (36.5 °C)   SpO2: 94%   Weight: 175 lb (79.4 kg)   Height: 6' (1.829 m)       MDM  ED Course as of 05/05/22 1707   Thu May 05, 2022   1321 No evidence of significant traumatic injury from a fall today. Imaging is limited due to patient posture and positioning from severe kyphosis. [EMORY]      ED Course User Index  [EMORY] Adalid Thomson., MD     Event limitations of imaging, low suspicion for significant injury evaluation here. Patient monitored in the emergency department for a while had no change in status. Evaluation and work-up here revealed no signs of emergent or life-threatening pathology that would necessitate admission for further work-up or management at this time. Patient is felt to be stable for discharge home with return precautions for worsening of the condition or development of new concerning symptoms. Patient was encouraged to follow-up with their primary care doctor in the appropriate timeframe. Necessary prescriptions and information have been provided for treatment at home. Patient voices understanding and agreement with the plan. CONSULTS:  None    PROCEDURES:  Unless otherwise notedbelow, none     Procedures    FINAL IMPRESSION     1. Fall from standing, initial encounter    2. Kyphosis of cervicothoracic region, unspecified kyphosis type    3.  Parkinsonism, unspecified Parkinsonism type [facility])          DISPOSITION/PLAN   DISPOSITION Decision To Discharge 05/05/2022 01:20:36 PM      PATIENT REFERRED TO:  [facility] EMERGENCY DEPT  Zain Lockhart  [phone]    If symptoms worsen    Frank Kessler  [facility] [id number] [phone]      As needed      DISCHARGE MEDICATIONS:  Discharge Medication List as of 5/5/2022  1:38 PM             (Please note that portions of this note were completed with a voice recognition program.  Efforts were made to edit the dictations butoccasionally words are mis-transcribed.)    Audra Stewart MD (electronically signed)  Emergency Physician          Audra Stewart., MD  05/05/22 9506

## 2023-08-22 NOTE — PROGRESS NOTES
falls occasionally. Physical therapy has been helpful recently. No further weight loss  Active Ambulatory Problems     Diagnosis Date Noted    Neuropathy     Benign meningioma ([facility] 03/29/2017    Memory difficulties 03/29/2017    Non-smoker 03/29/2017    Normal body mass index (BMI) 03/29/2017    OAB (overactive bladder) 10/19/2017    Primary Parkinsonism ([facility] 03/29/2017    [facility] 10/10/2016    BARBARA (stress urinary incontinence), male 10/10/2016    Acute CVA (cerebrovascular accident) ([facility] 02/17/2020    Patent foramen ovale     Acute ischemic stroke [facility] 02/19/2020    Weakness 02/29/2020    Confusion 02/29/2020    Other insomnia 02/29/2020    History of stroke 06/01/2020    Left ventricular hypertrophy 06/01/2020     Resolved Ambulatory Problems     Diagnosis Date Noted    No Resolved Ambulatory Problems     Past Medical History:   Diagnosis Date    Cancer [facility]     Parkinson disease ([facility]     Vitamin B12 deficiency        Past Surgical History:   Procedure Laterality Date    BACK SURGERY      MOHS SURGERY      NECK SURGERY      PROSTATECTOMY         History reviewed. No pertinent family history.     Allergies   Allergen Reactions    Finasteride     Other Other (See Comments)     ANTIHISTAMINES - TYPE UNKNOWN  ANTIHISTAMINES - TYPE UNKNOWN  ANTIHISTAMINES - TYPE UNKNOWN    Scopolamine Other (See Comments)     Scopolamine patch-- caused hallucination and confusion    Sulfa Antibiotics        Social History     Socioeconomic History    Marital status:      Spouse name: Not on file    Number of children: Not on file    Years of education: Not on file    Highest education level: Not on file   Occupational History    Not on file   Social Needs    Financial resource strain: Not on file    Food insecurity     Worry: Not on file     Inability: Not on file    Transportation needs     Medical: Not on file     Non-medical: Not on file   Tobacco Use    Smoking status: Never Smoker    Smokeless tobacco: Never Used   Substance and Sexual Activity    Alcohol use: No    Drug use: No    Sexual activity: Not on file   Lifestyle    Physical activity     Days per week: Not on file     Minutes per session: Not on file    Stress: Not on file   Relationships    Social connections     Talks on phone: Not on file     Gets together: Not on file     Attends [employer] service: Not on file     Active member of club or organization: Not on file     Attends meetings of clubs or organizations: Not on file     Relationship status: Not on file    Intimate partner violence     Fear of current or ex partner: Not on file     Emotionally abused: Not on file     Physically abused: Not on file     Forced sexual activity: Not on file   Other Topics Concern    Not on file   Social History Narrative     50+ years    He has 2 daughters    Never in the [employer]    He was a [employer]    Presently not driving    No special hobbies or interest    Physically sedentary    No history of alcohol or tobacco consumption or substance usage       Review of Systems     Constitutional: ?Fever ? Sweats ? Chills ? Recent Injury ? Denies all unless marked   HENT:?Headache ? Head Injury ? Sore Throat ? Ear Pain ? Dizziness ? Hearing Loss ? Denies all unless marked   Musculoskeletal: ? Arthralgia ? Myalgias ? Muscle cramps ? Muscle twitches   ? Denies all unless marked   Spine: ? Neck pain ? Back pain ? Sciaticia ? Denies all unless marked   Neurological:? Visual Disturbance ? Double Vision ? Slurred Speech ? Trouble swallowing ? Vertigo ? Tingling ? Numbness ? Weakness ? Loss of Balance   ? Loss of Consciousness ? Memory Loss ? Seizures ? Denies all unless marked   Psychiatric/Behavioral:? Depression ? Anxiety ? Denies all unless marked   Sleep: ? Insomnia ? Sleep Disturbance ? Snoring ? Restless Legs ? Daytime Sleepiness ? Sleep Apnea ?  Denies all unless marked            Current Outpatient Medications   Medication Sig Dispense Refill    carbidopa-levodopa (SINEMET CR)  MG per extended release tablet TAKE 1 TABLET BY MOUTH FOUR TIMES A DAY (Patient taking differently: 1/2 PO QID) 120 tablet 18    clopidogrel (PLAVIX) 75 MG tablet TAKE 1 TABLET BY MOUTH EVERY DAY 30 tablet 2    DULoxetine (CYMBALTA) 60 MG extended release capsule TAKE 1 CAPSULE BY MOUTH EVERY DAY 30 capsule 14    carvedilol (COREG) 6.25 MG tablet TAKE 1 TABLET BY MOUTH TWICE A DAY WITH MEALS 60 tablet 5    lisinopril (PRINIVIL;ZESTRIL) 40 MG tablet TAKE 1 TABLET BY MOUTH EVERY DAY 30 tablet 5    cyanocobalamin ([facility]S VITAMIN B12) 1000 MCG tablet Take 1,000 mcg by mouth daily      DULoxetine (CYMBALTA) 30 MG extended release capsule TAKE 1 CAPSULE BY MOUTH EVERY DAY (Patient not taking: Reported on 4/7/2021) 30 capsule 5     No current facility-administered medications for this visit. /88   Pulse 70   Resp 14   Ht 6' (1.829 m)   Wt 150 lb (68 kg)   BMI 20.34 kg/m²       Constitutional - well developed, well nourished. Eyes - conjunctiva normal.  Pupils react to light  Ear, nose, throat -hearing intact to finger rub No scars, masses, or lesions over external nose or ears, no atrophy of tongue  Neck-symmetric, no masses noted, no jugular vein distension. No bruits noted. Respiration- chest wall appears symmetric, good expansion,   normal effort without use of accessory muscles  Cardiovascular- RRR  Musculoskeletal - no significant wasting of muscles noted, no bony deformities, gait no gross ataxia  Extremities-no clubbing, cyanosis or edema  Skin - warm, dry, and intact. No rash, erythema, or pallor. Psychiatric - mood, affect, and behavior appear normal.      Neurological exam  Awake, alert, fluent oriented x 3 appropriate affect  Attention and concentration appear appropriate  . Followed complex commands well.   Speech shows hypophonia but no dysarthria  No clear issues with language of fund of knowledge    Cranial Nerve Exam   CN II- Visual fields grossly unremarkable. VA adequate. PERRLA. CN III, IV,VI-EOMI, No nystagmus, conjugate eye movements, no ptosis  CN VII-no facial asymmetry. He has a masked face with decreased blink rate  CN VIII-Hearing intact       Motor Exam  V/V throughout upper and lower extremities bilaterally, has bradykinesia which is worse on the Right. Some cogwheeling is seen on the right. Reflexes   Absent throughout      Tremors- no tremors in hands or head noted    Gait  Somewhat unsteady. Feet are close together. Decreased arm swing. Uses a cane. Festination noted  Coordination  Finger to nose-unremarkable          Assessment    ICD-10-CM    1. Parkinson disease ([facility]  G20    2. History of meningioma of the brain  Z86.011    3. Neuropathy  G62.9    4. Visual hallucinations  R44.1    5. Memory loss  R41.3    No further strokelike symptoms. Neuropathy and restless legs are stable. Meningioma followed by Yaima Dangelo [place]. His Parkinson's disease is slowly progressing. Hallucinations likely due to medications but it does help his mobility. I have not made any changes in his medications today. Daily exercise stressed. Would consider the addition of Aricept but they are going to think it over. Return in about 3 months (around 7/7/2021).     (Please note that portions of this note were completed with a voice recognition program. Efforts were made to edit the dictations but occasionally words are mis-transcribed.) Ear Star Wedge Flap Text: The defect edges were debeveled with a #15 blade scalpel.  Given the location of the defect and the proximity to free margins (helical rim) an ear star wedge flap was deemed most appropriate.  Using a sterile surgical marker, the appropriate flap was drawn incorporating the defect and placing the expected incisions between the helical rim and antihelix where possible.  The area thus outlined was incised through and through with a #15 scalpel blade.